# Patient Record
Sex: MALE | Race: WHITE | Employment: UNEMPLOYED | URBAN - METROPOLITAN AREA
[De-identification: names, ages, dates, MRNs, and addresses within clinical notes are randomized per-mention and may not be internally consistent; named-entity substitution may affect disease eponyms.]

---

## 2021-02-18 ENCOUNTER — OFFICE VISIT (OUTPATIENT)
Dept: FAMILY MEDICINE CLINIC | Facility: CLINIC | Age: 56
End: 2021-02-18
Payer: MEDICAID

## 2021-02-18 ENCOUNTER — TELEPHONE (OUTPATIENT)
Dept: FAMILY MEDICINE CLINIC | Facility: CLINIC | Age: 56
End: 2021-02-18

## 2021-02-18 VITALS
WEIGHT: 238 LBS | DIASTOLIC BLOOD PRESSURE: 84 MMHG | HEART RATE: 78 BPM | OXYGEN SATURATION: 98 % | HEIGHT: 74 IN | TEMPERATURE: 98 F | RESPIRATION RATE: 16 BRPM | BODY MASS INDEX: 30.54 KG/M2 | SYSTOLIC BLOOD PRESSURE: 120 MMHG

## 2021-02-18 DIAGNOSIS — Z12.5 SCREENING FOR MALIGNANT NEOPLASM OF PROSTATE: ICD-10-CM

## 2021-02-18 DIAGNOSIS — H83.3X3 NOISE-INDUCED HEARING LOSS OF BOTH EARS: ICD-10-CM

## 2021-02-18 DIAGNOSIS — Z00.00 WELLNESS EXAMINATION: Primary | ICD-10-CM

## 2021-02-18 PROBLEM — C64.1 CLEAR CELL CARCINOMA OF RIGHT KIDNEY (HCC): Status: ACTIVE | Noted: 2021-02-18

## 2021-02-18 PROBLEM — F52.4 PREMATURE EJACULATION: Status: ACTIVE | Noted: 2021-02-18

## 2021-02-18 PROBLEM — I10 ESSENTIAL HYPERTENSION: Status: ACTIVE | Noted: 2021-02-18

## 2021-02-18 PROCEDURE — 99213 OFFICE O/P EST LOW 20 MIN: CPT | Performed by: FAMILY MEDICINE

## 2021-02-18 PROCEDURE — 3008F BODY MASS INDEX DOCD: CPT | Performed by: FAMILY MEDICINE

## 2021-02-18 PROCEDURE — 3079F DIAST BP 80-89 MM HG: CPT | Performed by: FAMILY MEDICINE

## 2021-02-18 PROCEDURE — 99396 PREV VISIT EST AGE 40-64: CPT | Performed by: FAMILY MEDICINE

## 2021-02-18 PROCEDURE — 3074F SYST BP LT 130 MM HG: CPT | Performed by: FAMILY MEDICINE

## 2021-02-18 RX ORDER — ASPIRIN 81 MG/1
81 TABLET ORAL DAILY
COMMUNITY
End: 2021-12-01 | Stop reason: DRUGHIGH

## 2021-02-18 RX ORDER — AMLODIPINE BESYLATE 5 MG/1
5 TABLET ORAL DAILY
Qty: 90 TABLET | Refills: 1 | Status: SHIPPED | OUTPATIENT
Start: 2021-02-18 | End: 2021-09-28

## 2021-02-18 RX ORDER — MULTIVITAMIN
1 TABLET ORAL DAILY
COMMUNITY

## 2021-02-18 RX ORDER — PAROXETINE 10 MG/1
10 TABLET, FILM COATED ORAL EVERY MORNING
COMMUNITY
End: 2021-07-20

## 2021-02-18 RX ORDER — AMLODIPINE BESYLATE 5 MG/1
5 TABLET ORAL DAILY
COMMUNITY
End: 2021-02-18

## 2021-02-18 NOTE — PATIENT INSTRUCTIONS
Thank you for choosing Roma Hopkins MD at [place] [address]  To Do: Jocelyn Beauchamp  1. Please see age appropriate health prevention below    [facility] is located in [address]. Monday, Tuesday & Friday – 8 a.m. to 4 p.m.   Wednesday, Thursda the benefits outweigh those potential risks and we strive to make you healthier and to improve your quality of life.     Referrals, and Radiology Information:    If your insurance requires a referral to a specialist, please allow 5 business days to process How often   Unhealthy alcohol use All men in this age group At routine exams   Blood pressure All men in this age group Yearly checkup if your blood pressure is normal  Normal blood pressure is less than 120/80 mm Hg  If your blood pressure reading is high if you are at risk   Lung cancer Men between the ages of 54 to 76 who in fairly good health and are at higher risk for lung cancer  · Currently smoke or who have quit within past 15 years  · 30-pack year smoking history  a Eligibility criteria and age [facility] mumps, rubella (MMR) Men in this age group born in [birth year] or later who have no record of these infections or vaccines 1 or 2 doses; talk with your healthcare provider   Meningococcal ACWY (MenACWY) Men at increased risk for infection 1 or 2 doses depending o talk with your healthcare provider   Use of statins Men between the ages of 36 and 76 years who have ; men  · An LDL-C level of more than 70 mg/dL but less than 190 mg/dL, no diabetes and borderline to high level of risk  · An LDL-C level of 190 mg/dL or g

## 2021-02-18 NOTE — H&P
Wellness Exam    REASON FOR VISIT:    Antonella Martinez is a 63year old male who presents for an [address]     Current Complaints: Mr. Ricci Frankel is here for his wellness exam  Flu Shot: see immunization record  Health Maintenance Topics with due sta and/or your children, in your home or elsewhere?: No     CAGE:     Cut: Have you ever felt you should Cut down on your drinking?: No    Annoyed: Have people Annoyed you by criticizing your drinking?: No    Guilty: Have you ever felt bad or Guilty about you risk No components found for: PPDINDURAT      ALLERGIES:   No Known Allergies    CURRENT MEDICATIONS:   Current Outpatient Medications   Medication Sig Dispense Refill   • amLODIPine Besylate 5 MG Oral Tab Take 5 mg by mouth daily.      • aspirin 81 MG Oral °F (36.6 °C) (Temporal)   Resp 16   Ht 6' 2\" (1.88 m)   Wt 238 lb (108 kg)   SpO2 98%   BMI 30.56 kg/m²   Constitutional: He appears well-developed, nourished, and his stated age. Vital signs reviewed  Pleasant man   Head: Normocephalic and atraumatic. low-salt and low-carb diet and may resume exercises. We shall check routine labs are good the most follow-up with his nephrologist  Continue current medication  Follow-up in 6 months or as needed.   Orders Placed This Encounter      CBC W Differential W Pl

## 2021-02-18 NOTE — TELEPHONE ENCOUNTER
Patient was here today for a new pt appt, forgot to ask for Amlodipine refill, please advise, pharmacy confirmed.

## 2021-02-20 RX ORDER — AMLODIPINE BESYLATE 5 MG/1
5 TABLET ORAL DAILY
Qty: 90 TABLET | Refills: 1 | OUTPATIENT
Start: 2021-02-20

## 2021-02-20 NOTE — TELEPHONE ENCOUNTER
Hypertension Medications Protocol [id number]/18/2021 10:05 PM   CMP or BMP in past 12 months Protocol Details    Last serum creatinine< 2.0     Appointment in past 6 or next 3 months      Refill protocol failed because the patient did not meet the protocol c

## 2021-02-22 ENCOUNTER — LAB ENCOUNTER (OUTPATIENT)
Dept: LAB | Age: 56
End: 2021-02-22
Attending: FAMILY MEDICINE
Payer: MEDICAID

## 2021-02-22 DIAGNOSIS — Z00.00 WELLNESS EXAMINATION: ICD-10-CM

## 2021-02-22 DIAGNOSIS — Z12.5 SCREENING FOR MALIGNANT NEOPLASM OF PROSTATE: ICD-10-CM

## 2021-02-22 LAB
ALBUMIN SERPL-MCNC: 3.6 G/DL (ref 3.4–5)
ALBUMIN/GLOB SERPL: 0.9 {RATIO} (ref 1–2)
ALP LIVER SERPL-CCNC: 125 U/L
ALT SERPL-CCNC: 25 U/L
ANION GAP SERPL CALC-SCNC: 3 MMOL/L (ref 0–18)
AST SERPL-CCNC: 9 U/L (ref 15–37)
BASOPHILS # BLD AUTO: 0.14 X10(3) UL (ref 0–0.2)
BASOPHILS NFR BLD AUTO: 1.6 %
BILIRUB SERPL-MCNC: 0.3 MG/DL (ref 0.1–2)
BUN BLD-MCNC: 15 MG/DL (ref 7–18)
BUN/CREAT SERPL: 10.3 (ref 10–20)
CALCIUM BLD-MCNC: 9.1 MG/DL (ref 8.5–10.1)
CHLORIDE SERPL-SCNC: 110 MMOL/L (ref 98–112)
CHOLEST SMN-MCNC: 157 MG/DL (ref ?–200)
CO2 SERPL-SCNC: 27 MMOL/L (ref 21–32)
COMPLEXED PSA SERPL-MCNC: 1.28 NG/ML (ref ?–4)
CREAT BLD-MCNC: 1.46 MG/DL
DEPRECATED RDW RBC AUTO: 42.7 FL (ref 35.1–46.3)
EOSINOPHIL # BLD AUTO: 0.36 X10(3) UL (ref 0–0.7)
EOSINOPHIL NFR BLD AUTO: 4 %
ERYTHROCYTE [DISTWIDTH] IN BLOOD BY AUTOMATED COUNT: 13.2 % (ref 11–15)
GLOBULIN PLAS-MCNC: 3.8 G/DL (ref 2.8–4.4)
GLUCOSE BLD-MCNC: 113 MG/DL (ref 70–99)
HCT VFR BLD AUTO: 45.2 %
HDLC SERPL-MCNC: 55 MG/DL (ref 40–59)
HGB BLD-MCNC: 15 G/DL
IMM GRANULOCYTES # BLD AUTO: 0.02 X10(3) UL (ref 0–1)
IMM GRANULOCYTES NFR BLD: 0.2 %
LDLC SERPL CALC-MCNC: 82 MG/DL (ref ?–100)
LYMPHOCYTES # BLD AUTO: 3.43 X10(3) UL (ref 1–4)
LYMPHOCYTES NFR BLD AUTO: 38.6 %
M PROTEIN MFR SERPL ELPH: 7.4 G/DL (ref 6.4–8.2)
MCH RBC QN AUTO: 29.6 PG (ref 26–34)
MCHC RBC AUTO-ENTMCNC: 33.2 G/DL (ref 31–37)
MCV RBC AUTO: 89.3 FL
MONOCYTES # BLD AUTO: 0.68 X10(3) UL (ref 0.1–1)
MONOCYTES NFR BLD AUTO: 7.6 %
NEUTROPHILS # BLD AUTO: 4.26 X10 (3) UL (ref 1.5–7.7)
NEUTROPHILS # BLD AUTO: 4.26 X10(3) UL (ref 1.5–7.7)
NEUTROPHILS NFR BLD AUTO: 48 %
NONHDLC SERPL-MCNC: 102 MG/DL (ref ?–130)
OSMOLALITY SERPL CALC.SUM OF ELEC: 292 MOSM/KG (ref 275–295)
PATIENT FASTING Y/N/NP: YES
PATIENT FASTING Y/N/NP: YES
PLATELET # BLD AUTO: 344 10(3)UL (ref 150–450)
POTASSIUM SERPL-SCNC: 4.4 MMOL/L (ref 3.5–5.1)
RBC # BLD AUTO: 5.06 X10(6)UL
SODIUM SERPL-SCNC: 140 MMOL/L (ref 136–145)
T4 FREE SERPL-MCNC: 0.8 NG/DL (ref 0.8–1.7)
TRIGL SERPL-MCNC: 101 MG/DL (ref 30–149)
TSI SER-ACNC: 1.32 MIU/ML (ref 0.36–3.74)
VLDLC SERPL CALC-MCNC: 20 MG/DL (ref 0–30)
WBC # BLD AUTO: 8.9 X10(3) UL (ref 4–11)

## 2021-02-22 PROCEDURE — 84443 ASSAY THYROID STIM HORMONE: CPT

## 2021-02-22 PROCEDURE — 80053 COMPREHEN METABOLIC PANEL: CPT

## 2021-02-22 PROCEDURE — 36415 COLL VENOUS BLD VENIPUNCTURE: CPT

## 2021-02-22 PROCEDURE — 85025 COMPLETE CBC W/AUTO DIFF WBC: CPT

## 2021-02-22 PROCEDURE — 84439 ASSAY OF FREE THYROXINE: CPT

## 2021-02-22 PROCEDURE — 80061 LIPID PANEL: CPT

## 2021-03-10 PROBLEM — H90.3 SENSORY HEARING LOSS, BILATERAL: Status: ACTIVE | Noted: 2021-03-10

## 2021-03-10 PROBLEM — H93.299 ABNORMAL AUDITORY PERCEPTION, UNSPECIFIED LATERALITY: Status: ACTIVE | Noted: 2021-03-10

## 2021-04-20 ENCOUNTER — IMMUNIZATION (OUTPATIENT)
Dept: LAB | Age: 56
End: 2021-04-20
Attending: HOSPITALIST
Payer: MEDICAID

## 2021-04-20 DIAGNOSIS — Z23 NEED FOR VACCINATION: Primary | ICD-10-CM

## 2021-04-20 PROCEDURE — 0001A SARSCOV2 VAC 30MCG/0.3ML IM: CPT

## 2021-05-11 ENCOUNTER — IMMUNIZATION (OUTPATIENT)
Dept: LAB | Age: 56
End: 2021-05-11
Attending: HOSPITALIST
Payer: MEDICAID

## 2021-05-11 DIAGNOSIS — Z23 NEED FOR VACCINATION: Primary | ICD-10-CM

## 2021-05-11 PROCEDURE — 0002A SARSCOV2 VAC 30MCG/0.3ML IM: CPT

## 2021-07-20 ENCOUNTER — HOSPITAL ENCOUNTER (EMERGENCY)
Age: 56
Discharge: HOME | End: 2021-07-21
Attending: EMERGENCY MEDICINE
Payer: MEDICAID

## 2021-07-20 VITALS
WEIGHT: 238 LBS | HEART RATE: 72 BPM | DIASTOLIC BLOOD PRESSURE: 73 MMHG | BODY MASS INDEX: 30.54 KG/M2 | HEIGHT: 74 IN | TEMPERATURE: 98 F | RESPIRATION RATE: 16 BRPM | OXYGEN SATURATION: 96 % | SYSTOLIC BLOOD PRESSURE: 138 MMHG

## 2021-07-20 DIAGNOSIS — J06.9 UPPER RESPIRATORY TRACT INFECTION, UNSPECIFIED TYPE: Primary | ICD-10-CM

## 2021-07-20 LAB — SARS-COV-2 RNA RESP QL NAA+PROBE: NOT DETECTED

## 2021-07-20 PROCEDURE — 99282 EMERGENCY DEPT VISIT SF MDM: CPT

## 2021-07-20 PROCEDURE — 87430 STREP A AG IA: CPT | Performed by: EMERGENCY MEDICINE

## 2021-07-20 RX ORDER — PAROXETINE HYDROCHLORIDE 20 MG/1
TABLET, FILM COATED ORAL
COMMUNITY
Start: 2021-03-11 | End: 2021-09-13

## 2021-07-21 NOTE — ED PROVIDER NOTES
Patient Seen in: THE [facility] Emergency Department In [place]      History   Patient presents with:  Sore Throat    Stated Complaint: SORE THROAT    HPI/Subjective:   HPI    Patient is a 69-year-old male who states for the past 4 days he has had sore throat sounds, soft, nontender, nondistended. No rebound, no guarding, no hepatosplenomegaly. EXTREMITIES: Full range of motion, no tenderness, good capillary refill. SKIN: No rash, good turgor. NEURO: Patient answers questions appropriately.   No focal defici

## 2021-08-16 ENCOUNTER — OFFICE VISIT (OUTPATIENT)
Dept: FAMILY MEDICINE CLINIC | Facility: CLINIC | Age: 56
End: 2021-08-16
Payer: MEDICAID

## 2021-08-16 VITALS
HEIGHT: 74 IN | OXYGEN SATURATION: 99 % | RESPIRATION RATE: 16 BRPM | SYSTOLIC BLOOD PRESSURE: 122 MMHG | WEIGHT: 246 LBS | BODY MASS INDEX: 31.57 KG/M2 | HEART RATE: 64 BPM | DIASTOLIC BLOOD PRESSURE: 68 MMHG | TEMPERATURE: 97 F

## 2021-08-16 DIAGNOSIS — I10 ESSENTIAL HYPERTENSION: Primary | ICD-10-CM

## 2021-08-16 DIAGNOSIS — R53.82 CHRONIC FATIGUE: ICD-10-CM

## 2021-08-16 PROCEDURE — 3008F BODY MASS INDEX DOCD: CPT | Performed by: FAMILY MEDICINE

## 2021-08-16 PROCEDURE — 3074F SYST BP LT 130 MM HG: CPT | Performed by: FAMILY MEDICINE

## 2021-08-16 PROCEDURE — 99214 OFFICE O/P EST MOD 30 MIN: CPT | Performed by: FAMILY MEDICINE

## 2021-08-16 PROCEDURE — 3078F DIAST BP <80 MM HG: CPT | Performed by: FAMILY MEDICINE

## 2021-08-16 NOTE — PATIENT INSTRUCTIONS
Thank you for choosing Cj Atwood MD at [place] [address]  To Do: Ena Hilario  1. High Blood pressure  What Is a Normal Blood Pressure?   The Joint National Committee on Prevention, Detection, Evaluation, and Treatment of High Blood Pressure has c pelvis, and legs   Heart failure   Poor blood supply to the legs  Erectile Dysfunction  Problems with your vision    Overview  \"Blood pressure\" is the force of blood pushing against the walls of the arteries as the heart pumps blood.  If this pressure ris no more than 2,300 mg a day (eating only 1,500 mg a day is an even better goal). Reduce saturated fat to no more than 6% of daily calories and total fat to 27% of daily calories.  Low-fat dairy products appear to be especially beneficial for lowering syst rice or whole-wheat noodles can serve as the main dish for a meal.   • Fresh or frozen vegetables are both good choices. When buying frozen and canned vegetables, choose those labeled as low sodium or without added salt.    • To increase the number of servi lactose intolerance. • Go easy on regular and even fat-free cheeses because they are typically high in sodium.    Lean meat, poultry and fish (6 or fewer servings a day)  Meat can be a rich source of protein, B vitamins, iron and zinc. But because even le benefits. Fats and oils (2 to 3 servings a day)  Fat helps your body absorb essential vitamins and helps your body's immune system. But too much fat increases your risk of heart disease, diabetes and obesity.  The DASH diet strives for a healthy balance b DASH diet: Alcohol and caffeine  Drinking too much alcohol can increase blood pressure. The DASH diet recommends that men limit alcohol to two or fewer drinks a day and women one or less. The DASH diet doesn't address caffeine consumption.  The influenc bland, gradually introduce low-sodium foods and cut back on table salt until you reach your sodium goal. That'll give your palate time to adjust. It can take several weeks for your taste buds to get used to less salty foods.      [facility] is loc notify us and stop treatment if problems arise, but know that our intention is that the benefits outweigh those potential risks and we strive to make you healthier and to improve your quality of life.     Referrals, and Radiology Information:    If your ins

## 2021-08-16 NOTE — PROGRESS NOTES
HPI/Subjective:   Patient ID: Lizette Velarde is a 63year old male.     HPI  Mr. Melissa Avalos is a pleasant 71-year-old gentleman history of hypertension, premature ejaculation, bilateral hearing loss, noise induced, clear cell carcinoma of the right kidney status p Tab Take 1 tablet (5 mg total) by mouth daily. 90 tablet 1     Allergies:No Known Allergies    Objective:   Physical Exam  Vitals reviewed. Constitutional:       General: He is not in acute distress.   HENT:      Mouth/Throat:      Mouth: Mucous membranes Orders Placed This Encounter      CBC W Differential W Platelet [E]      Comp Metabolic Panel (14) [E]      Lipid Panel [E]      Vitamin B12 [E]      Vitamin D [E]      Meds This Visit:  Requested Prescriptions      No prescriptions requested or ord

## 2021-08-17 ENCOUNTER — TELEPHONE (OUTPATIENT)
Dept: FAMILY MEDICINE CLINIC | Facility: CLINIC | Age: 56
End: 2021-08-17

## 2021-08-18 ENCOUNTER — LAB ENCOUNTER (OUTPATIENT)
Dept: LAB | Age: 56
End: 2021-08-18
Attending: FAMILY MEDICINE
Payer: MEDICAID

## 2021-08-18 DIAGNOSIS — I10 ESSENTIAL HYPERTENSION: ICD-10-CM

## 2021-08-18 DIAGNOSIS — R53.82 CHRONIC FATIGUE: ICD-10-CM

## 2021-08-18 LAB
ALBUMIN SERPL-MCNC: 4 G/DL (ref 3.4–5)
ALBUMIN/GLOB SERPL: 1.1 {RATIO} (ref 1–2)
ALP LIVER SERPL-CCNC: 125 U/L
ALT SERPL-CCNC: 41 U/L
ANION GAP SERPL CALC-SCNC: 7 MMOL/L (ref 0–18)
AST SERPL-CCNC: 40 U/L (ref 15–37)
BASOPHILS # BLD AUTO: 0.13 X10(3) UL (ref 0–0.2)
BASOPHILS NFR BLD AUTO: 1.9 %
BILIRUB SERPL-MCNC: 0.3 MG/DL (ref 0.1–2)
BUN BLD-MCNC: 17 MG/DL (ref 7–18)
CALCIUM BLD-MCNC: 8.9 MG/DL (ref 8.5–10.1)
CHLORIDE SERPL-SCNC: 112 MMOL/L (ref 98–112)
CHOLEST SMN-MCNC: 153 MG/DL (ref ?–200)
CO2 SERPL-SCNC: 23 MMOL/L (ref 21–32)
CREAT BLD-MCNC: 1.49 MG/DL
EOSINOPHIL # BLD AUTO: 0.29 X10(3) UL (ref 0–0.7)
EOSINOPHIL NFR BLD AUTO: 4.2 %
ERYTHROCYTE [DISTWIDTH] IN BLOOD BY AUTOMATED COUNT: 13 %
GLOBULIN PLAS-MCNC: 3.6 G/DL (ref 2.8–4.4)
GLUCOSE BLD-MCNC: 123 MG/DL (ref 70–99)
HCT VFR BLD AUTO: 46 %
HDLC SERPL-MCNC: 52 MG/DL (ref 40–59)
HGB BLD-MCNC: 14.8 G/DL
IMM GRANULOCYTES # BLD AUTO: 0.02 X10(3) UL (ref 0–1)
IMM GRANULOCYTES NFR BLD: 0.3 %
LDLC SERPL CALC-MCNC: 86 MG/DL (ref ?–100)
LYMPHOCYTES # BLD AUTO: 2.45 X10(3) UL (ref 1–4)
LYMPHOCYTES NFR BLD AUTO: 35.4 %
M PROTEIN MFR SERPL ELPH: 7.6 G/DL (ref 6.4–8.2)
MCH RBC QN AUTO: 28.7 PG (ref 26–34)
MCHC RBC AUTO-ENTMCNC: 32.2 G/DL (ref 31–37)
MCV RBC AUTO: 89.1 FL
MONOCYTES # BLD AUTO: 0.56 X10(3) UL (ref 0.1–1)
MONOCYTES NFR BLD AUTO: 8.1 %
NEUTROPHILS # BLD AUTO: 3.47 X10 (3) UL (ref 1.5–7.7)
NEUTROPHILS # BLD AUTO: 3.47 X10(3) UL (ref 1.5–7.7)
NEUTROPHILS NFR BLD AUTO: 50.1 %
NONHDLC SERPL-MCNC: 101 MG/DL (ref ?–130)
OSMOLALITY SERPL CALC.SUM OF ELEC: 297 MOSM/KG (ref 275–295)
PATIENT FASTING Y/N/NP: YES
PATIENT FASTING Y/N/NP: YES
PLATELET # BLD AUTO: 347 10(3)UL (ref 150–450)
POTASSIUM SERPL-SCNC: 4.3 MMOL/L (ref 3.5–5.1)
RBC # BLD AUTO: 5.16 X10(6)UL
SODIUM SERPL-SCNC: 142 MMOL/L (ref 136–145)
TRIGL SERPL-MCNC: 78 MG/DL (ref 30–149)
VIT B12 SERPL-MCNC: 572 PG/ML (ref 193–986)
VIT D+METAB SERPL-MCNC: 20.1 NG/ML (ref 30–100)
VLDLC SERPL CALC-MCNC: 12 MG/DL (ref 0–30)
WBC # BLD AUTO: 6.9 X10(3) UL (ref 4–11)

## 2021-08-18 PROCEDURE — 82607 VITAMIN B-12: CPT

## 2021-08-18 PROCEDURE — 82306 VITAMIN D 25 HYDROXY: CPT

## 2021-08-18 PROCEDURE — 85025 COMPLETE CBC W/AUTO DIFF WBC: CPT

## 2021-08-18 PROCEDURE — 80061 LIPID PANEL: CPT

## 2021-08-18 PROCEDURE — 36415 COLL VENOUS BLD VENIPUNCTURE: CPT

## 2021-08-18 PROCEDURE — 80053 COMPREHEN METABOLIC PANEL: CPT

## 2021-08-29 ENCOUNTER — OFFICE VISIT (OUTPATIENT)
Dept: SLEEP CENTER | Age: 56
End: 2021-08-29
Attending: INTERNAL MEDICINE
Payer: MEDICAID

## 2021-08-29 DIAGNOSIS — R53.82 CHRONIC FATIGUE: ICD-10-CM

## 2021-08-29 PROCEDURE — 95806 SLEEP STUDY UNATT&RESP EFFT: CPT

## 2021-09-01 NOTE — PROCEDURES
[address] [place] [address],[facility] [address]       Accredited by the [facility] of [facility] (AASM)    PATIENT'S NAME:        Lakesha Donohue  ATTENDING PHYSICIAN:   Nan Auguste M.D. REFERRING PHYSICIAN:   Brenda Lang.  Sherita Ríos MD  PAT

## 2021-09-13 ENCOUNTER — PATIENT MESSAGE (OUTPATIENT)
Dept: FAMILY MEDICINE CLINIC | Facility: CLINIC | Age: 56
End: 2021-09-13

## 2021-09-13 RX ORDER — PAROXETINE HYDROCHLORIDE 20 MG/1
20 TABLET, FILM COATED ORAL EVERY MORNING
Qty: 90 TABLET | Refills: 1 | Status: SHIPPED | OUTPATIENT
Start: 2021-09-13 | End: 2021-09-15

## 2021-09-13 NOTE — TELEPHONE ENCOUNTER
See pt message, Rx pended for your review as you have not previously prescribed this medication.      Last OV 8/16/2021 HTN  AWV 2/18/2021

## 2021-09-13 NOTE — TELEPHONE ENCOUNTER
From: Lizette Velarde  To:  Ganga Parker MD  Sent: 9/13/2021 12:03 PM CDT  Subject: Referral Request    Sir good morning   I am out of my tablet parixitine 10 mg  Kindly request to refill   Thanks

## 2021-09-15 RX ORDER — PAROXETINE HYDROCHLORIDE 20 MG/1
20 TABLET, FILM COATED ORAL EVERY MORNING
Qty: 90 TABLET | Refills: 1 | Status: SHIPPED | OUTPATIENT
Start: 2021-09-15 | End: 2022-01-06

## 2021-09-15 RX ORDER — PAROXETINE HYDROCHLORIDE 20 MG/1
20 TABLET, FILM COATED ORAL EVERY MORNING
Qty: 90 TABLET | Refills: 1 | Status: SHIPPED | OUTPATIENT
Start: 2021-09-15 | End: 2021-09-28

## 2021-09-28 ENCOUNTER — OFFICE VISIT (OUTPATIENT)
Dept: FAMILY MEDICINE CLINIC | Facility: CLINIC | Age: 56
End: 2021-09-28
Payer: MEDICAID

## 2021-09-28 VITALS
HEIGHT: 74 IN | SYSTOLIC BLOOD PRESSURE: 120 MMHG | OXYGEN SATURATION: 98 % | WEIGHT: 241 LBS | RESPIRATION RATE: 16 BRPM | DIASTOLIC BLOOD PRESSURE: 72 MMHG | HEART RATE: 74 BPM | BODY MASS INDEX: 30.93 KG/M2 | TEMPERATURE: 97 F

## 2021-09-28 DIAGNOSIS — Z23 NEED FOR VACCINATION: ICD-10-CM

## 2021-09-28 DIAGNOSIS — S33.5XXA LUMBAR SPRAIN, INITIAL ENCOUNTER: Primary | ICD-10-CM

## 2021-09-28 PROCEDURE — 99213 OFFICE O/P EST LOW 20 MIN: CPT | Performed by: FAMILY MEDICINE

## 2021-09-28 PROCEDURE — 90471 IMMUNIZATION ADMIN: CPT | Performed by: FAMILY MEDICINE

## 2021-09-28 PROCEDURE — 3078F DIAST BP <80 MM HG: CPT | Performed by: FAMILY MEDICINE

## 2021-09-28 PROCEDURE — 3008F BODY MASS INDEX DOCD: CPT | Performed by: FAMILY MEDICINE

## 2021-09-28 PROCEDURE — 90686 IIV4 VACC NO PRSV 0.5 ML IM: CPT | Performed by: FAMILY MEDICINE

## 2021-09-28 PROCEDURE — 3074F SYST BP LT 130 MM HG: CPT | Performed by: FAMILY MEDICINE

## 2021-09-28 RX ORDER — HYDROCHLOROTHIAZIDE 25 MG/1
25 TABLET ORAL DAILY
COMMUNITY
End: 2021-11-15

## 2021-09-28 RX ORDER — CYCLOBENZAPRINE HCL 10 MG
10 TABLET ORAL NIGHTLY PRN
Qty: 15 TABLET | Refills: 0 | Status: SHIPPED | OUTPATIENT
Start: 2021-09-28 | End: 2021-12-01

## 2021-09-28 NOTE — PROGRESS NOTES
Subjective:   Patient ID: Ena Hilario is a 63year old male. HPI  Mr. Leann Buckley is a pleasant 50-year-old gentleman with history of hypertension and anxiety here with his wife for right sided lumbar pain for 1 week.   He describes the pain is constant and m Back:    Neurological:      Mental Status: He is alert. Assessment & Plan:   Lumbar sprain, initial encounter  (primary encounter diagnosis)  -Explained to them that this is muscular in nature; try to lower weight load for now.   –Apply heat or ice

## 2021-11-13 ENCOUNTER — PATIENT MESSAGE (OUTPATIENT)
Dept: FAMILY MEDICINE CLINIC | Facility: CLINIC | Age: 56
End: 2021-11-13

## 2021-11-15 RX ORDER — HYDROCHLOROTHIAZIDE 25 MG/1
25 TABLET ORAL DAILY
Qty: 90 TABLET | Refills: 0 | Status: SHIPPED | OUTPATIENT
Start: 2021-11-15 | End: 2022-01-06

## 2021-11-15 RX ORDER — HYDROCHLOROTHIAZIDE 25 MG/1
25 TABLET ORAL DAILY
Qty: 90 TABLET | Refills: 0 | Status: SHIPPED | OUTPATIENT
Start: 2021-11-15 | End: 2021-11-15

## 2021-11-15 NOTE — TELEPHONE ENCOUNTER
Medication Quantity Refills Start End   hydroCHLOROthiazide 25 MG Oral Tab       Sig:   Take 25 mg by mouth daily.      Route:   Oral       Last OV 9/28/21  Future Appointments   Date Time Provider Rony Olivas   2/14/2022 10:00 AM Loni Woods MD

## 2021-11-15 NOTE — TELEPHONE ENCOUNTER
From: Henok Hemphill  To:  Tameka Miranda MD  Sent: 11/13/2021 9:14 AM CST  Subject: No refill     Hi  I am out of my blood pressure medication & there is no refill on it so plz refill my medication   Hydrochlorothiazide 25mg once daily   Thanks

## 2021-11-17 ENCOUNTER — APPOINTMENT (OUTPATIENT)
Dept: GENERAL RADIOLOGY | Age: 56
End: 2021-11-17
Attending: EMERGENCY MEDICINE
Payer: MEDICAID

## 2021-11-17 ENCOUNTER — HOSPITAL ENCOUNTER (EMERGENCY)
Age: 56
Discharge: HOME | End: 2021-11-17
Attending: EMERGENCY MEDICINE
Payer: MEDICAID

## 2021-11-17 VITALS
DIASTOLIC BLOOD PRESSURE: 95 MMHG | OXYGEN SATURATION: 99 % | RESPIRATION RATE: 20 BRPM | HEART RATE: 101 BPM | BODY MASS INDEX: 29 KG/M2 | TEMPERATURE: 98 F | WEIGHT: 224.88 LBS | SYSTOLIC BLOOD PRESSURE: 154 MMHG

## 2021-11-17 DIAGNOSIS — R22.33 ARM MASS, BILATERAL: ICD-10-CM

## 2021-11-17 DIAGNOSIS — M54.12 CERVICAL RADICULOPATHY: Primary | ICD-10-CM

## 2021-11-17 PROCEDURE — 72050 X-RAY EXAM NECK SPINE 4/5VWS: CPT | Performed by: EMERGENCY MEDICINE

## 2021-11-17 PROCEDURE — 99283 EMERGENCY DEPT VISIT LOW MDM: CPT

## 2021-11-17 PROCEDURE — 73060 X-RAY EXAM OF HUMERUS: CPT | Performed by: EMERGENCY MEDICINE

## 2021-11-17 RX ORDER — HYDROCODONE BITARTRATE AND ACETAMINOPHEN 5; 325 MG/1; MG/1
1-2 TABLET ORAL EVERY 6 HOURS PRN
Qty: 10 TABLET | Refills: 0 | Status: SHIPPED | OUTPATIENT
Start: 2021-11-17 | End: 2021-11-23

## 2021-11-17 RX ORDER — IBUPROFEN 600 MG/1
600 TABLET ORAL ONCE
Status: COMPLETED | OUTPATIENT
Start: 2021-11-17 | End: 2021-11-17

## 2021-11-17 RX ORDER — METHYLPREDNISOLONE 4 MG/1
TABLET ORAL
Qty: 1 EACH | Refills: 0 | Status: SHIPPED | OUTPATIENT
Start: 2021-11-17 | End: 2021-12-01 | Stop reason: ALTCHOICE

## 2021-11-17 RX ORDER — CYCLOBENZAPRINE HCL 10 MG
10 TABLET ORAL 3 TIMES DAILY PRN
Qty: 20 TABLET | Refills: 0 | Status: SHIPPED | OUTPATIENT
Start: 2021-11-17 | End: 2021-11-23

## 2021-11-18 ENCOUNTER — TELEPHONE (OUTPATIENT)
Dept: FAMILY MEDICINE CLINIC | Facility: CLINIC | Age: 56
End: 2021-11-18

## 2021-11-18 NOTE — TELEPHONE ENCOUNTER
[facility] for patient to call in regards to recent ER visit and to call office at [phone] if an appointment is needed.

## 2021-11-18 NOTE — ED PROVIDER NOTES
Patient Seen in: THE [facility] Emergency Department In [place]      History   Patient presents with:  Arm or Hand Injury    Stated Complaint: left upper arm pain    Subjective:   HPI    Patient a 79-year-old male who states that for the last 2 to 3 days he h near the tricep region roughly 2 x 4 cm. Patient has similar mass on the right arm near the triceps. Slight tenderness mid humerus. Elbow range of motion nontender for strong radial pulse. Strong . Sensation tact light touch.   Good range of motion Normal, Disp-10 tablet, R-0    !! cyclobenzaprine 10 MG Oral Tab  Take 1 tablet (10 mg total) by mouth 3 (three) times daily as needed for Muscle spasms. , Normal, Disp-20 tablet, R-0    !! - Potential duplicate medications found.  Please discuss with provid

## 2021-11-22 ENCOUNTER — TELEPHONE (OUTPATIENT)
Dept: FAMILY MEDICINE CLINIC | Facility: CLINIC | Age: 56
End: 2021-11-22

## 2021-11-22 NOTE — TELEPHONE ENCOUNTER
Pt wife states that pt went in to the urgent care/Er for arm pain and they told them to follow up with the Primary care doctor.  He is still experiencing bad pain in his left arm and there are not appointments until the 30 th of November, and he will be goi

## 2021-11-22 NOTE — TELEPHONE ENCOUNTER
Spoke with Ameena Mae regarding pt, asked if pt is taking the medrol dose pack, cyclobenzaprine, and pain medication he was prescribed at his ER visit, confirmed that he is.  Asked if pt is having improvement in symptoms, Lianne confirmed that pt is having [place]

## 2021-11-23 ENCOUNTER — OFFICE VISIT (OUTPATIENT)
Dept: FAMILY MEDICINE CLINIC | Facility: CLINIC | Age: 56
End: 2021-11-23
Payer: MEDICAID

## 2021-11-23 VITALS
TEMPERATURE: 99 F | HEIGHT: 74 IN | HEART RATE: 92 BPM | WEIGHT: 244 LBS | BODY MASS INDEX: 31.32 KG/M2 | OXYGEN SATURATION: 99 % | RESPIRATION RATE: 16 BRPM | SYSTOLIC BLOOD PRESSURE: 144 MMHG | DIASTOLIC BLOOD PRESSURE: 90 MMHG

## 2021-11-23 DIAGNOSIS — M25.512 ACUTE PAIN OF LEFT SHOULDER: Primary | ICD-10-CM

## 2021-11-23 DIAGNOSIS — M47.812 ARTHRITIS OF NECK: ICD-10-CM

## 2021-11-23 DIAGNOSIS — Z12.11 COLON CANCER SCREENING: ICD-10-CM

## 2021-11-23 PROCEDURE — 3080F DIAST BP >= 90 MM HG: CPT | Performed by: FAMILY MEDICINE

## 2021-11-23 PROCEDURE — 3008F BODY MASS INDEX DOCD: CPT | Performed by: FAMILY MEDICINE

## 2021-11-23 PROCEDURE — 99214 OFFICE O/P EST MOD 30 MIN: CPT | Performed by: FAMILY MEDICINE

## 2021-11-23 PROCEDURE — 3077F SYST BP >= 140 MM HG: CPT | Performed by: FAMILY MEDICINE

## 2021-11-23 RX ORDER — GABAPENTIN 300 MG/1
300 CAPSULE ORAL NIGHTLY
Qty: 30 CAPSULE | Refills: 0 | Status: SHIPPED | OUTPATIENT
Start: 2021-11-23 | End: 2021-12-01

## 2021-11-23 NOTE — PROGRESS NOTES
Patient presents with:  ER F/U: 11/17- L arm/ shoulder pain that radiates to neck. on medications but still in pain      HPI:  50-year-old male who has a history of chronic neck pain presents today with left shoulder pain that is on the left side.   It is r Premature ejaculation     Noise-induced hearing loss of both ears     Sensory hearing loss, bilateral     Abnormal auditory perception, unspecified laterality      Past Medical History:   Diagnosis Date   • Carcinoid tumor of kidney    • Essential hyperte 10/17/2016      Influenza             03/14/2018  10/01/2020        Physical Exam  /90   Pulse 92   Temp 98.7 °F (37.1 °C) (Temporal)   Resp 16   Ht 6' 2\" (1.88 m)   Wt 244 lb (110.7 kg)   SpO2 99%   BMI 31.33 kg/m²   Constitutional: Oriented to per that were done in the ER. Patient started on gabapentin for pain control. Referred to see physical therapy. Follow-up in 2 to 4 weeks if symptoms do not improve. Can refer to see pain management.  - gabapentin 300 MG Oral Cap;  Take 1 capsule (300 mg to arthritis include:   · Joint pain and stiffness. These symptoms get worse with long periods of rest. They may also get worse from using a joint too long or too hard. · Joints that have lost normal shape and motion.  They may look swollen and be hard to mov

## 2021-12-01 ENCOUNTER — OFFICE VISIT (OUTPATIENT)
Dept: FAMILY MEDICINE CLINIC | Facility: CLINIC | Age: 56
End: 2021-12-01
Payer: MEDICAID

## 2021-12-01 VITALS
SYSTOLIC BLOOD PRESSURE: 138 MMHG | RESPIRATION RATE: 14 BRPM | HEART RATE: 104 BPM | OXYGEN SATURATION: 97 % | DIASTOLIC BLOOD PRESSURE: 90 MMHG | BODY MASS INDEX: 31.32 KG/M2 | TEMPERATURE: 98 F | WEIGHT: 244 LBS | HEIGHT: 74 IN

## 2021-12-01 DIAGNOSIS — S46.812D TRAPEZIUS STRAIN, LEFT, SUBSEQUENT ENCOUNTER: Primary | ICD-10-CM

## 2021-12-01 PROCEDURE — 3080F DIAST BP >= 90 MM HG: CPT | Performed by: FAMILY MEDICINE

## 2021-12-01 PROCEDURE — 3075F SYST BP GE 130 - 139MM HG: CPT | Performed by: FAMILY MEDICINE

## 2021-12-01 PROCEDURE — 99213 OFFICE O/P EST LOW 20 MIN: CPT | Performed by: FAMILY MEDICINE

## 2021-12-01 PROCEDURE — 3008F BODY MASS INDEX DOCD: CPT | Performed by: FAMILY MEDICINE

## 2021-12-01 RX ORDER — ASPIRIN 325 MG
325 TABLET ORAL DAILY
COMMUNITY

## 2021-12-01 NOTE — PROGRESS NOTES
Subjective:   Patient ID: Tyrone Wang is a 63year old male. HPI  Mr. Vinnie Luz is a very pleasant 21-year-old gentleman with history of hypertension and anxiety here with his wife.   He was seen by one of my partners in the office for left shoulder and lef the left shoulder. Neurological:      Mental Status: He is alert.          Assessment & Plan:   Trapezius strain, left, subsequent encounter  (primary encounter diagnosis)  -May take Tylenol 500 mg 3 times a day as needed; continue to apply heat at least

## 2021-12-01 NOTE — PATIENT INSTRUCTIONS
Thank you for choosing Fernanda Salazar MD at [place] [address]  To Do: Reba Stanton  1. Please see info   [employer] is located in [address]. Monday, Tuesday & Friday – 8 a.m. to 4 p.m. Wednesday, Thursday – 7 a.m. to 3 p.m.   The lab is rajesh risks and we strive to make you healthier and to improve your quality of life.     Referrals, and Radiology Information:    If your insurance requires a referral to a specialist, please allow 5 business days to process your referral request.    Bryson Robins 10 to 15 minutes every hour you’re awake for the first 2 days. · After the swelling goes down, use cold or heat to control pain. Don’t use heat late in the day, since it can cause swelling when you’re not active.   Rest and elevate  Rest and elevation help

## 2021-12-15 ENCOUNTER — OFFICE VISIT (OUTPATIENT)
Dept: PHYSICAL THERAPY | Age: 56
End: 2021-12-15
Attending: FAMILY MEDICINE
Payer: MEDICAID

## 2021-12-15 DIAGNOSIS — M25.512 ACUTE PAIN OF LEFT SHOULDER: ICD-10-CM

## 2021-12-15 DIAGNOSIS — M47.812 ARTHRITIS OF NECK: ICD-10-CM

## 2021-12-15 PROCEDURE — 97110 THERAPEUTIC EXERCISES: CPT

## 2021-12-15 PROCEDURE — 97161 PT EVAL LOW COMPLEX 20 MIN: CPT

## 2021-12-15 NOTE — PATIENT INSTRUCTIONS
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  She subsequently completed her Doctor of Physical T

## 2021-12-15 NOTE — PROGRESS NOTES
SPINE EVALUATION:   Referring Physician: Dr. Roderick Harrell  Diagnosis: Acute pain of left shoulder (M25.512)  Arthritis of neck (X92.759) Date of Service: 12/15/2021     PATIENT SUMMARY   Marino Sharif is a 63year old L-handed male who presents to therapy t voiced understanding and performs HEP correctly without reported pain. Skilled Physical Therapy is medically necessary to address the above impairments and reach functional goals. Precautions:  Hearing impairment  OBJECTIVE:   Observation/Posture:  Vance Polanco Timed Treatment: 10 min     Total Treatment Time: 40 min     Based on the clinical presentation, examination and history, the condition is stable and the evaluation is low complexity.     PLAN OF CARE:    Goals: (to be met in 8 visits)    · Pt will improve Date____________________    Certification From: 94/60/2262  To:3/15/2022

## 2021-12-20 ENCOUNTER — OFFICE VISIT (OUTPATIENT)
Dept: PHYSICAL THERAPY | Age: 56
End: 2021-12-20
Attending: FAMILY MEDICINE
Payer: MEDICAID

## 2021-12-20 PROCEDURE — 97110 THERAPEUTIC EXERCISES: CPT

## 2021-12-20 NOTE — PROGRESS NOTES
Dx: Acute pain of left shoulder (M25.512)  Arthritis of neck (M47.812)         Insurance (Authorized # of Visits):  [facility] 8 visits auth 12/15-6/13/2022         Authorizing Physician: Dr. Jenny Blake  Next MD visit: none scheduled  Fall Risk: standard 2x10 Yellow  Open the Book Stretch 5\"x10 L                HEP:   12/15/2021 Scapular Retractions, Doorway Pec Stretch, Upper Trap Stretch, Levator Scap Stretch    Charges:  Therex x 2       Total Timed Treatment: 25 min  Total Treatment Time: 25 min

## 2021-12-27 ENCOUNTER — OFFICE VISIT (OUTPATIENT)
Dept: PHYSICAL THERAPY | Age: 56
End: 2021-12-27
Attending: FAMILY MEDICINE
Payer: MEDICAID

## 2021-12-27 PROCEDURE — 97110 THERAPEUTIC EXERCISES: CPT

## 2021-12-27 PROCEDURE — 97140 MANUAL THERAPY 1/> REGIONS: CPT

## 2021-12-27 NOTE — PROGRESS NOTES
Dx: Acute pain of left shoulder (M25.512)  Arthritis of neck (M47.812)         Insurance (Authorized # of Visits):  [facility] 8 visits auth 12/15-6/13/2022         Authorizing Physician: Dr. Veronica Gage  Next MD visit: none scheduled  Fall Risk: standard TX#: 5/ Date:    Tx#: 6/   Therex  Scap Retractions, 3\"x30 sitting  L UT stretch 3x30\" active, sitting  L LS stretch 3x30\" active, sitting  Doorway Pec Stretch 3x30\"  Foam Roll (FR) Claps w/ Wrist Ext at Wall 15x  [facility]

## 2022-01-03 ENCOUNTER — OFFICE VISIT (OUTPATIENT)
Dept: PHYSICAL THERAPY | Age: 57
End: 2022-01-03
Attending: FAMILY MEDICINE
Payer: MEDICAID

## 2022-01-03 PROCEDURE — 97110 THERAPEUTIC EXERCISES: CPT

## 2022-01-03 NOTE — PROGRESS NOTES
Dx: Acute pain of left shoulder (M25.512)  Arthritis of neck (M47.812)         Insurance (Authorized # of Visits):  [facility] 8 visits auth 12/15-6/13/2022         Authorizing Physician: Dr. Anthony Guillaume  Next MD visit: none scheduled  Fall Risk: standard 5\"x15 ea R/L  Cervical Retractions supine 2x10  Prone Scap Retractions, 2x10  Prone Shld Ext (B) 2x10 Therex  UBE retro iso 80 6'  TRX Shld Flex Walkouts 3\" x 20  FR Scap Retractions 2x15  FR Claps w/ Wrist Ext at Wall 2x15  FR [place] at [employer]

## 2022-01-06 ENCOUNTER — HOSPITAL ENCOUNTER (OUTPATIENT)
Dept: GENERAL RADIOLOGY | Age: 57
Discharge: HOME | End: 2022-01-06
Attending: FAMILY MEDICINE
Payer: MEDICAID

## 2022-01-06 ENCOUNTER — OFFICE VISIT (OUTPATIENT)
Dept: FAMILY MEDICINE CLINIC | Facility: CLINIC | Age: 57
End: 2022-01-06
Payer: MEDICAID

## 2022-01-06 VITALS
WEIGHT: 244 LBS | BODY MASS INDEX: 31.32 KG/M2 | TEMPERATURE: 97 F | DIASTOLIC BLOOD PRESSURE: 72 MMHG | RESPIRATION RATE: 16 BRPM | HEIGHT: 74 IN | OXYGEN SATURATION: 98 % | SYSTOLIC BLOOD PRESSURE: 124 MMHG | HEART RATE: 106 BPM

## 2022-01-06 DIAGNOSIS — M54.50 LUMBAR PAIN: ICD-10-CM

## 2022-01-06 DIAGNOSIS — M54.50 LUMBAR PAIN: Primary | ICD-10-CM

## 2022-01-06 PROCEDURE — 74018 RADEX ABDOMEN 1 VIEW: CPT | Performed by: FAMILY MEDICINE

## 2022-01-06 PROCEDURE — 3074F SYST BP LT 130 MM HG: CPT | Performed by: FAMILY MEDICINE

## 2022-01-06 PROCEDURE — 99213 OFFICE O/P EST LOW 20 MIN: CPT | Performed by: FAMILY MEDICINE

## 2022-01-06 PROCEDURE — 3078F DIAST BP <80 MM HG: CPT | Performed by: FAMILY MEDICINE

## 2022-01-06 PROCEDURE — 3008F BODY MASS INDEX DOCD: CPT | Performed by: FAMILY MEDICINE

## 2022-01-06 RX ORDER — HYDROCHLOROTHIAZIDE 25 MG/1
25 TABLET ORAL DAILY
Qty: 90 TABLET | Refills: 0 | Status: SHIPPED | OUTPATIENT
Start: 2022-01-06

## 2022-01-06 RX ORDER — PAROXETINE HYDROCHLORIDE 20 MG/1
20 TABLET, FILM COATED ORAL EVERY MORNING
Qty: 90 TABLET | Refills: 1 | Status: SHIPPED | OUTPATIENT
Start: 2022-01-06

## 2022-01-06 NOTE — PROGRESS NOTES
Subjective:   Patient ID: Rudi Mckinney is a 61year old male.     HPI  Mr. Easton Heaton is a very pleasant 25-year-old gentleman with a history of hypertension, anxiety, depression, history of right renal cell carcinoma status post nephrectomy here today for yandel heart sounds. No murmur heard. Pulmonary:      Effort: Pulmonary effort is normal. No respiratory distress. Breath sounds: Normal breath sounds. No wheezing or rales. Abdominal:      General: Bowel sounds are normal. There is no distension.

## 2022-01-06 NOTE — PATIENT INSTRUCTIONS
Thank you for choosing Emilia Martino MD at [place] [address]  To Do: Arely Phillip  1. Please have xray done as ordered  [employer] is located in [address]. Monday, Tuesday & Friday – 8 a.m. to 4 p.m. Wednesday, Thursday – 7 a.m. to 3 p.m. those potential risks and we strive to make you healthier and to improve your quality of life.     Referrals, and Radiology Information:    If your insurance requires a referral to a specialist, please allow 5 business days to process your referral request.

## 2022-01-06 NOTE — PROGRESS NOTES
Imaging report reviewed and shows no concerning findings.  Please notify patient.    -Dr. Anthony Guillaume

## 2022-01-07 ENCOUNTER — APPOINTMENT (OUTPATIENT)
Dept: PHYSICAL THERAPY | Age: 57
End: 2022-01-07
Attending: FAMILY MEDICINE
Payer: MEDICAID

## 2022-01-11 ENCOUNTER — OFFICE VISIT (OUTPATIENT)
Dept: PHYSICAL THERAPY | Age: 57
End: 2022-01-11
Attending: FAMILY MEDICINE
Payer: MEDICAID

## 2022-01-11 PROCEDURE — 97110 THERAPEUTIC EXERCISES: CPT

## 2022-01-11 NOTE — PROGRESS NOTES
Dx: Acute pain of left shoulder (M25.512)  Arthritis of neck (M47.812)         Insurance (Authorized # of Visits):  [facility] 8 visits auth 12/15-6/13/2022         Authorizing Physician: Dr. Irving Sierra MD visit: none scheduled  Fall Risk: standard Yellow  Open the Book Stretch 5\"x15 ea R/L  Cervical Retractions supine 2x10  Prone Scap Retractions, 2x10  Prone Shld Ext (B) 2x10 Therex  UBE retro iso 80 6'  TRX Shld Flex Walkouts 3\" x 20  FR Scap Retractions 2x15  FR Claps w/ Wrist Ext at [address] [facility]

## 2022-01-14 ENCOUNTER — APPOINTMENT (OUTPATIENT)
Dept: PHYSICAL THERAPY | Age: 57
End: 2022-01-14
Attending: FAMILY MEDICINE
Payer: MEDICAID

## 2022-01-18 ENCOUNTER — APPOINTMENT (OUTPATIENT)
Dept: PHYSICAL THERAPY | Age: 57
End: 2022-01-18
Attending: FAMILY MEDICINE
Payer: MEDICAID

## 2022-01-18 ENCOUNTER — TELEPHONE (OUTPATIENT)
Dept: PHYSICAL THERAPY | Age: 57
End: 2022-01-18

## 2022-02-24 ENCOUNTER — TELEPHONE (OUTPATIENT)
Dept: FAMILY MEDICINE CLINIC | Facility: CLINIC | Age: 57
End: 2022-02-24

## 2022-02-24 NOTE — TELEPHONE ENCOUNTER
Spoke to [employer]	[place], Rx sent on 1/6/22 #90 that patient has not picked up yet, they will process refill. MyChart sent to let pt know.

## 2022-02-24 NOTE — TELEPHONE ENCOUNTER
Pt's wife stopped by to ask if dr can refill her husbands blood pressure medication. He ran out of his medication. She would like a [employer] message when the refill is sent.

## 2022-03-15 ENCOUNTER — TELEPHONE (OUTPATIENT)
Dept: NUTRITION | Facility: HOSPITAL | Age: 57
End: 2022-03-15

## 2022-03-15 NOTE — TELEPHONE ENCOUNTER
Spoke with patients spouse regarding outpatient nutrition consultation. Pt has Medicaid-RD consult not covered. This RD will provide written materials via mail, encouraged to call with further questions. Thank you.

## 2022-04-25 ENCOUNTER — OFFICE VISIT (OUTPATIENT)
Dept: FAMILY MEDICINE CLINIC | Facility: CLINIC | Age: 57
End: 2022-04-25
Payer: MEDICAID

## 2022-04-25 VITALS
DIASTOLIC BLOOD PRESSURE: 82 MMHG | RESPIRATION RATE: 16 BRPM | BODY MASS INDEX: 29.9 KG/M2 | TEMPERATURE: 98 F | OXYGEN SATURATION: 99 % | HEIGHT: 74 IN | WEIGHT: 233 LBS | SYSTOLIC BLOOD PRESSURE: 110 MMHG | HEART RATE: 80 BPM

## 2022-04-25 DIAGNOSIS — N28.9 RENAL INSUFFICIENCY: ICD-10-CM

## 2022-04-25 DIAGNOSIS — I10 ESSENTIAL HYPERTENSION: ICD-10-CM

## 2022-04-25 DIAGNOSIS — R10.9 LEFT FLANK PAIN: Primary | ICD-10-CM

## 2022-04-25 DIAGNOSIS — Z12.5 SCREENING FOR MALIGNANT NEOPLASM OF PROSTATE: ICD-10-CM

## 2022-04-25 DIAGNOSIS — C64.1 CLEAR CELL CARCINOMA OF RIGHT KIDNEY (HCC): ICD-10-CM

## 2022-04-25 PROCEDURE — 99214 OFFICE O/P EST MOD 30 MIN: CPT | Performed by: FAMILY MEDICINE

## 2022-04-25 PROCEDURE — 3074F SYST BP LT 130 MM HG: CPT | Performed by: FAMILY MEDICINE

## 2022-04-25 PROCEDURE — 3079F DIAST BP 80-89 MM HG: CPT | Performed by: FAMILY MEDICINE

## 2022-04-25 PROCEDURE — 3008F BODY MASS INDEX DOCD: CPT | Performed by: FAMILY MEDICINE

## 2022-04-25 RX ORDER — CLOBETASOL PROPIONATE 0.5 MG/G
1 CREAM TOPICAL 2 TIMES DAILY
Qty: 60 G | Refills: 1 | Status: SHIPPED | OUTPATIENT
Start: 2022-04-25

## 2022-04-25 RX ORDER — CLOBETASOL PROPIONATE 0.5 MG/G
CREAM TOPICAL 2 TIMES DAILY
COMMUNITY
End: 2022-04-25

## 2022-05-05 ENCOUNTER — LAB ENCOUNTER (OUTPATIENT)
Dept: LAB | Age: 57
End: 2022-05-05
Attending: FAMILY MEDICINE
Payer: MEDICAID

## 2022-05-05 ENCOUNTER — TELEPHONE (OUTPATIENT)
Dept: FAMILY MEDICINE CLINIC | Facility: CLINIC | Age: 57
End: 2022-05-05

## 2022-05-05 ENCOUNTER — HOSPITAL ENCOUNTER (OUTPATIENT)
Dept: CT IMAGING | Age: 57
Discharge: HOME | End: 2022-05-05
Attending: FAMILY MEDICINE
Payer: MEDICAID

## 2022-05-05 DIAGNOSIS — I10 ESSENTIAL HYPERTENSION: ICD-10-CM

## 2022-05-05 DIAGNOSIS — Z12.5 SCREENING FOR MALIGNANT NEOPLASM OF PROSTATE: ICD-10-CM

## 2022-05-05 DIAGNOSIS — C64.1 CLEAR CELL CARCINOMA OF RIGHT KIDNEY (HCC): ICD-10-CM

## 2022-05-05 DIAGNOSIS — R10.9 LEFT FLANK PAIN: ICD-10-CM

## 2022-05-05 LAB
ALBUMIN SERPL-MCNC: 3.5 G/DL (ref 3.4–5)
ALBUMIN/GLOB SERPL: 1 {RATIO} (ref 1–2)
ALP LIVER SERPL-CCNC: 92 U/L
ALT SERPL-CCNC: 34 U/L
ANION GAP SERPL CALC-SCNC: 3 MMOL/L (ref 0–18)
AST SERPL-CCNC: 25 U/L (ref 15–37)
BASOPHILS # BLD AUTO: 0.12 X10(3) UL (ref 0–0.2)
BASOPHILS NFR BLD AUTO: 1.3 %
BILIRUB SERPL-MCNC: 0.5 MG/DL (ref 0.1–2)
BUN BLD-MCNC: 18 MG/DL (ref 7–18)
CALCIUM BLD-MCNC: 8.8 MG/DL (ref 8.5–10.1)
CHLORIDE SERPL-SCNC: 104 MMOL/L (ref 98–112)
CHOLEST SERPL-MCNC: 168 MG/DL (ref ?–200)
CO2 SERPL-SCNC: 31 MMOL/L (ref 21–32)
COMPLEXED PSA SERPL-MCNC: 1.59 NG/ML (ref ?–4)
CREAT BLD-MCNC: 1.32 MG/DL
EOSINOPHIL # BLD AUTO: 0.3 X10(3) UL (ref 0–0.7)
EOSINOPHIL NFR BLD AUTO: 3.2 %
ERYTHROCYTE [DISTWIDTH] IN BLOOD BY AUTOMATED COUNT: 12.9 %
FASTING PATIENT LIPID ANSWER: YES
FASTING STATUS PATIENT QL REPORTED: YES
GLOBULIN PLAS-MCNC: 3.6 G/DL (ref 2.8–4.4)
GLUCOSE BLD-MCNC: 131 MG/DL (ref 70–99)
HCT VFR BLD AUTO: 47.9 %
HDLC SERPL-MCNC: 52 MG/DL (ref 40–59)
HGB BLD-MCNC: 15.5 G/DL
IMM GRANULOCYTES # BLD AUTO: 0.01 X10(3) UL (ref 0–1)
IMM GRANULOCYTES NFR BLD: 0.1 %
LDLC SERPL CALC-MCNC: 95 MG/DL (ref ?–100)
LYMPHOCYTES # BLD AUTO: 3.53 X10(3) UL (ref 1–4)
LYMPHOCYTES NFR BLD AUTO: 38 %
MCH RBC QN AUTO: 29.2 PG (ref 26–34)
MCHC RBC AUTO-ENTMCNC: 32.4 G/DL (ref 31–37)
MCV RBC AUTO: 90.2 FL
MONOCYTES # BLD AUTO: 0.65 X10(3) UL (ref 0.1–1)
MONOCYTES NFR BLD AUTO: 7 %
NEUTROPHILS # BLD AUTO: 4.67 X10 (3) UL (ref 1.5–7.7)
NEUTROPHILS # BLD AUTO: 4.67 X10(3) UL (ref 1.5–7.7)
NEUTROPHILS NFR BLD AUTO: 50.4 %
NONHDLC SERPL-MCNC: 116 MG/DL (ref ?–130)
OSMOLALITY SERPL CALC.SUM OF ELEC: 290 MOSM/KG (ref 275–295)
PLATELET # BLD AUTO: 318 10(3)UL (ref 150–450)
POTASSIUM SERPL-SCNC: 3.9 MMOL/L (ref 3.5–5.1)
PROT SERPL-MCNC: 7.1 G/DL (ref 6.4–8.2)
RBC # BLD AUTO: 5.31 X10(6)UL
SODIUM SERPL-SCNC: 138 MMOL/L (ref 136–145)
TRIGL SERPL-MCNC: 115 MG/DL (ref 30–149)
VLDLC SERPL CALC-MCNC: 19 MG/DL (ref 0–30)
WBC # BLD AUTO: 9.3 X10(3) UL (ref 4–11)

## 2022-05-05 PROCEDURE — 72192 CT PELVIS W/O DYE: CPT | Performed by: FAMILY MEDICINE

## 2022-05-05 PROCEDURE — 80061 LIPID PANEL: CPT

## 2022-05-05 PROCEDURE — 80053 COMPREHEN METABOLIC PANEL: CPT

## 2022-05-05 PROCEDURE — 36415 COLL VENOUS BLD VENIPUNCTURE: CPT

## 2022-05-05 PROCEDURE — 85025 COMPLETE CBC W/AUTO DIFF WBC: CPT

## 2022-05-05 NOTE — TELEPHONE ENCOUNTER
- Pt stopped in office after having CT of pelvis today and states [place] [address]. Needs prior authorization pre insurance company.     Please call patient with any questions [phone]

## 2022-05-10 ENCOUNTER — OFFICE VISIT (OUTPATIENT)
Dept: NEPHROLOGY | Facility: CLINIC | Age: 57
End: 2022-05-10
Payer: MEDICAID

## 2022-05-10 VITALS — WEIGHT: 236 LBS | SYSTOLIC BLOOD PRESSURE: 132 MMHG | DIASTOLIC BLOOD PRESSURE: 80 MMHG | BODY MASS INDEX: 30 KG/M2

## 2022-05-10 DIAGNOSIS — N18.30 STAGE 3 CHRONIC KIDNEY DISEASE, UNSPECIFIED WHETHER STAGE 3A OR 3B CKD (HCC): Primary | ICD-10-CM

## 2022-05-10 PROCEDURE — 3079F DIAST BP 80-89 MM HG: CPT | Performed by: INTERNAL MEDICINE

## 2022-05-10 PROCEDURE — 3075F SYST BP GE 130 - 139MM HG: CPT | Performed by: INTERNAL MEDICINE

## 2022-05-10 PROCEDURE — 99204 OFFICE O/P NEW MOD 45 MIN: CPT | Performed by: INTERNAL MEDICINE

## 2022-06-04 RX ORDER — CLOBETASOL PROPIONATE 0.5 MG/G
1 CREAM TOPICAL 2 TIMES DAILY
Qty: 60 G | Refills: 1 | Status: SHIPPED | OUTPATIENT
Start: 2022-06-04 | End: 2022-06-06

## 2022-06-04 RX ORDER — HYDROCHLOROTHIAZIDE 25 MG/1
TABLET ORAL
Qty: 90 TABLET | Refills: 0 | Status: SHIPPED | OUTPATIENT
Start: 2022-06-04

## 2022-06-06 RX ORDER — PAROXETINE HYDROCHLORIDE 20 MG/1
20 TABLET, FILM COATED ORAL EVERY MORNING
Qty: 90 TABLET | Refills: 1 | Status: SHIPPED | OUTPATIENT
Start: 2022-06-06

## 2022-06-06 RX ORDER — CLOBETASOL PROPIONATE 0.5 MG/G
1 CREAM TOPICAL 2 TIMES DAILY
Qty: 60 G | Refills: 1 | Status: SHIPPED | OUTPATIENT
Start: 2022-06-06

## 2022-06-23 ENCOUNTER — HOSPITAL ENCOUNTER (OUTPATIENT)
Dept: GENERAL RADIOLOGY | Age: 57
Discharge: HOME | End: 2022-06-23
Attending: FAMILY MEDICINE
Payer: MEDICAID

## 2022-06-23 ENCOUNTER — OFFICE VISIT (OUTPATIENT)
Dept: FAMILY MEDICINE CLINIC | Facility: CLINIC | Age: 57
End: 2022-06-23
Payer: MEDICAID

## 2022-06-23 VITALS
RESPIRATION RATE: 14 BRPM | HEIGHT: 74 IN | BODY MASS INDEX: 30.16 KG/M2 | WEIGHT: 235 LBS | SYSTOLIC BLOOD PRESSURE: 126 MMHG | DIASTOLIC BLOOD PRESSURE: 74 MMHG | TEMPERATURE: 98 F

## 2022-06-23 DIAGNOSIS — M25.561 ACUTE PAIN OF RIGHT KNEE: ICD-10-CM

## 2022-06-23 DIAGNOSIS — Z12.11 COLON CANCER SCREENING: ICD-10-CM

## 2022-06-23 DIAGNOSIS — M76.51 PATELLAR TENDINITIS OF RIGHT KNEE: ICD-10-CM

## 2022-06-23 DIAGNOSIS — M76.51 PATELLAR TENDINITIS OF RIGHT KNEE: Primary | ICD-10-CM

## 2022-06-23 PROCEDURE — 99213 OFFICE O/P EST LOW 20 MIN: CPT | Performed by: FAMILY MEDICINE

## 2022-06-23 PROCEDURE — 3008F BODY MASS INDEX DOCD: CPT | Performed by: FAMILY MEDICINE

## 2022-06-23 PROCEDURE — 3078F DIAST BP <80 MM HG: CPT | Performed by: FAMILY MEDICINE

## 2022-06-23 PROCEDURE — 3074F SYST BP LT 130 MM HG: CPT | Performed by: FAMILY MEDICINE

## 2022-06-23 RX ORDER — METHYLPREDNISOLONE 4 MG/1
TABLET ORAL
Qty: 1 EACH | Refills: 0 | Status: SHIPPED | OUTPATIENT
Start: 2022-06-23

## 2022-06-24 ENCOUNTER — HOSPITAL ENCOUNTER (OUTPATIENT)
Dept: GENERAL RADIOLOGY | Age: 57
Discharge: HOME | End: 2022-06-24
Attending: FAMILY MEDICINE
Payer: MEDICAID

## 2022-06-24 PROCEDURE — 73560 X-RAY EXAM OF KNEE 1 OR 2: CPT | Performed by: FAMILY MEDICINE

## 2022-06-30 ENCOUNTER — TELEPHONE (OUTPATIENT)
Dept: SURGERY | Facility: CLINIC | Age: 57
End: 2022-06-30

## 2022-06-30 ENCOUNTER — PATIENT MESSAGE (OUTPATIENT)
Dept: FAMILY MEDICINE CLINIC | Facility: CLINIC | Age: 57
End: 2022-06-30

## 2022-06-30 DIAGNOSIS — M76.51 PATELLAR TENDINITIS OF RIGHT KNEE: Primary | ICD-10-CM

## 2022-06-30 NOTE — TELEPHONE ENCOUNTER
Patient scheduled to see me in a couple weeks. H/o nephrectomy at [facility] ([facility]) in 2020. Can you ask him to either bring Urologic records to his appointment or have records faxed to our office?     Thanks    [facility]

## 2022-07-01 ENCOUNTER — TELEPHONE (OUTPATIENT)
Dept: ORTHOPEDICS CLINIC | Facility: CLINIC | Age: 57
End: 2022-07-01

## 2022-07-01 NOTE — TELEPHONE ENCOUNTER
Imaging was completed for this patient for a RT KNEE, but it needs to be reviewed to see if additional imaging is needed. If so, please enter the appropriate RX and reply to this message;  I will know to come in before their appointment to complete the additional imaging. Thank you!     Future Appointments   Date Time Provider Rony Olivas   7/5/2022  9:30 AM Deven Shelton PA-C [facility]

## 2022-07-05 ENCOUNTER — OFFICE VISIT (OUTPATIENT)
Dept: ORTHOPEDICS CLINIC | Facility: CLINIC | Age: 57
End: 2022-07-05
Payer: MEDICAID

## 2022-07-05 VITALS — OXYGEN SATURATION: 97 % | BODY MASS INDEX: 30.06 KG/M2 | HEIGHT: 74 IN | WEIGHT: 234.19 LBS | HEART RATE: 80 BPM

## 2022-07-05 DIAGNOSIS — S83.411A SPRAIN OF MEDIAL COLLATERAL LIGAMENT OF RIGHT KNEE, INITIAL ENCOUNTER: Primary | ICD-10-CM

## 2022-07-05 PROCEDURE — 99203 OFFICE O/P NEW LOW 30 MIN: CPT | Performed by: PHYSICIAN ASSISTANT

## 2022-07-05 PROCEDURE — 3008F BODY MASS INDEX DOCD: CPT | Performed by: PHYSICIAN ASSISTANT

## 2022-07-11 ENCOUNTER — OFFICE VISIT (OUTPATIENT)
Dept: SURGERY | Facility: CLINIC | Age: 57
End: 2022-07-11
Payer: MEDICAID

## 2022-07-11 DIAGNOSIS — Z80.42 FAMILY HISTORY OF PROSTATE CANCER IN FATHER: ICD-10-CM

## 2022-07-11 DIAGNOSIS — N52.9 ERECTILE DYSFUNCTION, UNSPECIFIED ERECTILE DYSFUNCTION TYPE: ICD-10-CM

## 2022-07-11 DIAGNOSIS — C64.1 CLEAR CELL CARCINOMA OF RIGHT KIDNEY (HCC): Primary | ICD-10-CM

## 2022-07-11 LAB
APPEARANCE: CLEAR
BILIRUBIN: NEGATIVE
GLUCOSE (URINE DIPSTICK): NEGATIVE MG/DL
KETONES (URINE DIPSTICK): NEGATIVE MG/DL
LEUKOCYTES: NEGATIVE
MULTISTIX LOT#: NORMAL NUMERIC
NITRITE, URINE: NEGATIVE
OCCULT BLOOD: NEGATIVE
PH, URINE: 7 (ref 4.5–8)
PROTEIN (URINE DIPSTICK): NEGATIVE MG/DL
SPECIFIC GRAVITY: 1.02 (ref 1–1.03)
URINE-COLOR: YELLOW
UROBILINOGEN,SEMI-QN: 0.2 MG/DL (ref 0–1.9)

## 2022-07-11 RX ORDER — SILDENAFIL 100 MG/1
100 TABLET, FILM COATED ORAL
Qty: 30 TABLET | Refills: 5 | Status: SHIPPED | OUTPATIENT
Start: 2022-07-11

## 2022-07-18 ENCOUNTER — TELEPHONE (OUTPATIENT)
Dept: SURGERY | Facility: CLINIC | Age: 57
End: 2022-07-18

## 2022-07-18 NOTE — TELEPHONE ENCOUNTER
Second call. RN called patient, Sophie Newsome [phone]   In response to his call/inquiry about the CT PA. No answer. Left message to of PA updates. May call or follow up with [facility] at [phone].

## 2022-07-18 NOTE — TELEPHONE ENCOUNTER
RN called patientLeeanna [phone]   In response to his call/inquiry about the CT PA. No answer. Left message to of PA updates. May call or follow up with [facility] at [phone]. Referral Notes  Number of Notes: 5    .   Type Date User Summary Attachment   Prior Authorization Pursuit 07/15/2022 11:16 AM Tray Robledo - -   Note    auth pending

## 2022-08-02 ENCOUNTER — TELEPHONE (OUTPATIENT)
Dept: SURGERY | Facility: CLINIC | Age: 57
End: 2022-08-02

## 2022-08-02 NOTE — TELEPHONE ENCOUNTER
Per spouse, calling to clarify procedure she is to cancel with [facility]. Please advise

## 2022-08-03 ENCOUNTER — TELEPHONE (OUTPATIENT)
Dept: SURGERY | Facility: CLINIC | Age: 57
End: 2022-08-03

## 2022-08-03 ENCOUNTER — OFFICE VISIT (OUTPATIENT)
Dept: PHYSICAL THERAPY | Age: 57
End: 2022-08-03
Attending: FAMILY MEDICINE
Payer: MEDICAID

## 2022-08-03 DIAGNOSIS — C64.9 RENAL CELL CARCINOMA, UNSPECIFIED LATERALITY (HCC): Primary | ICD-10-CM

## 2022-08-03 DIAGNOSIS — M25.561 ACUTE PAIN OF RIGHT KNEE: ICD-10-CM

## 2022-08-03 DIAGNOSIS — M76.51 PATELLAR TENDINITIS OF RIGHT KNEE: ICD-10-CM

## 2022-08-03 PROCEDURE — 97161 PT EVAL LOW COMPLEX 20 MIN: CPT

## 2022-08-03 PROCEDURE — 97110 THERAPEUTIC EXERCISES: CPT

## 2022-08-03 NOTE — TELEPHONE ENCOUNTER
Per [place] CPT code 50444 was denied by insurance, asking if separate order can be placed for this code. Please advise thank you.

## 2022-08-04 NOTE — TELEPHONE ENCOUNTER
Order for CT chest completed. This should be verified with insurance that CPT code is approved as [id number]. Please verify with managed care.

## 2022-08-04 NOTE — TELEPHONE ENCOUNTER
Марина Foster called. CT chest was approved. CT of abd/pelvis was denied. She is asking if new order for CT chest only can be placed. Pt is scheduled for tomorrow.  MACY please advise, thanks

## 2022-08-05 ENCOUNTER — OFFICE VISIT (OUTPATIENT)
Dept: PHYSICAL THERAPY | Age: 57
End: 2022-08-05
Attending: FAMILY MEDICINE
Payer: MEDICAID

## 2022-08-05 ENCOUNTER — TELEPHONE (OUTPATIENT)
Dept: SURGERY | Facility: CLINIC | Age: 57
End: 2022-08-05

## 2022-08-05 ENCOUNTER — HOSPITAL ENCOUNTER (OUTPATIENT)
Dept: CT IMAGING | Age: 57
Discharge: HOME | End: 2022-08-05
Attending: UROLOGY
Payer: MEDICAID

## 2022-08-05 DIAGNOSIS — C64.1 CLEAR CELL CARCINOMA OF RIGHT KIDNEY (HCC): ICD-10-CM

## 2022-08-05 DIAGNOSIS — C64.9 RENAL CELL CARCINOMA, UNSPECIFIED LATERALITY (HCC): ICD-10-CM

## 2022-08-05 DIAGNOSIS — M25.561 ACUTE PAIN OF RIGHT KNEE: ICD-10-CM

## 2022-08-05 DIAGNOSIS — M76.51 PATELLAR TENDINITIS OF RIGHT KNEE: ICD-10-CM

## 2022-08-05 LAB
CREAT BLD-MCNC: 1.3 MG/DL
GFR SERPLBLD BASED ON 1.73 SQ M-ARVRAT: 64 ML/MIN/1.73M2 (ref 60–?)

## 2022-08-05 PROCEDURE — 82565 ASSAY OF CREATININE: CPT

## 2022-08-05 PROCEDURE — 71260 CT THORAX DX C+: CPT | Performed by: PHYSICIAN ASSISTANT

## 2022-08-05 PROCEDURE — 97110 THERAPEUTIC EXERCISES: CPT

## 2022-08-05 NOTE — TELEPHONE ENCOUNTER
Pt calling states PA was not done for his CT scan states needs to be called case # [id number] please advise

## 2022-08-08 NOTE — TELEPHONE ENCOUNTER
RN called patient, Adelaide Watkins [phone]   in response to his inquiry about the CT. RN explained that original order (or case number of ) CT chest/abdomen on 7/11 was denied by insurance and a new order of CT chest was placed on 8/5. Patient explained that he received a letter of approval of the CT. Again, RN explained that CT chest was the only thing approved. Patient verbalized understanding and agreed to plans.

## 2022-08-10 ENCOUNTER — APPOINTMENT (OUTPATIENT)
Dept: PHYSICAL THERAPY | Age: 57
End: 2022-08-10
Attending: FAMILY MEDICINE
Payer: MEDICAID

## 2022-08-12 ENCOUNTER — OFFICE VISIT (OUTPATIENT)
Dept: PHYSICAL THERAPY | Age: 57
End: 2022-08-12
Attending: FAMILY MEDICINE
Payer: MEDICAID

## 2022-08-12 DIAGNOSIS — M76.51 PATELLAR TENDINITIS OF RIGHT KNEE: ICD-10-CM

## 2022-08-12 DIAGNOSIS — M25.561 ACUTE PAIN OF RIGHT KNEE: ICD-10-CM

## 2022-08-12 PROCEDURE — 97110 THERAPEUTIC EXERCISES: CPT

## 2022-08-15 ENCOUNTER — TELEPHONE (OUTPATIENT)
Dept: PHYSICAL THERAPY | Age: 57
End: 2022-08-15

## 2022-08-15 ENCOUNTER — APPOINTMENT (OUTPATIENT)
Dept: PHYSICAL THERAPY | Age: 57
End: 2022-08-15
Attending: FAMILY MEDICINE
Payer: MEDICAID

## 2022-08-17 ENCOUNTER — OFFICE VISIT (OUTPATIENT)
Dept: PHYSICAL THERAPY | Age: 57
End: 2022-08-17
Attending: FAMILY MEDICINE
Payer: MEDICAID

## 2022-08-17 PROCEDURE — 97110 THERAPEUTIC EXERCISES: CPT

## 2022-08-29 ENCOUNTER — APPOINTMENT (OUTPATIENT)
Dept: PHYSICAL THERAPY | Age: 57
End: 2022-08-29
Attending: FAMILY MEDICINE
Payer: MEDICAID

## 2022-08-31 RX ORDER — HYDROCHLOROTHIAZIDE 25 MG/1
25 TABLET ORAL DAILY
Qty: 90 TABLET | Refills: 0 | Status: SHIPPED | OUTPATIENT
Start: 2022-08-31

## 2022-09-07 ENCOUNTER — HOSPITAL ENCOUNTER (OUTPATIENT)
Dept: CT IMAGING | Age: 57
Discharge: HOME | End: 2022-09-07
Attending: PHYSICIAN ASSISTANT
Payer: MEDICAID

## 2022-09-07 DIAGNOSIS — C64.9 RENAL CELL CARCINOMA, UNSPECIFIED LATERALITY (HCC): ICD-10-CM

## 2022-09-07 LAB
CREAT BLD-MCNC: 1.3 MG/DL
GFR SERPLBLD BASED ON 1.73 SQ M-ARVRAT: 64 ML/MIN/1.73M2 (ref 60–?)

## 2022-09-07 PROCEDURE — 74170 CT ABD WO CNTRST FLWD CNTRST: CPT | Performed by: PHYSICIAN ASSISTANT

## 2022-09-07 PROCEDURE — 82565 ASSAY OF CREATININE: CPT

## 2022-09-12 ENCOUNTER — OFFICE VISIT (OUTPATIENT)
Dept: ORTHOPEDICS CLINIC | Facility: CLINIC | Age: 57
End: 2022-09-12
Payer: MEDICAID

## 2022-09-12 ENCOUNTER — OFFICE VISIT (OUTPATIENT)
Dept: FAMILY MEDICINE CLINIC | Facility: CLINIC | Age: 57
End: 2022-09-12
Payer: MEDICAID

## 2022-09-12 ENCOUNTER — TELEPHONE (OUTPATIENT)
Dept: ORTHOPEDICS CLINIC | Facility: CLINIC | Age: 57
End: 2022-09-12

## 2022-09-12 VITALS
OXYGEN SATURATION: 98 % | WEIGHT: 240 LBS | HEART RATE: 72 BPM | RESPIRATION RATE: 16 BRPM | TEMPERATURE: 98 F | HEIGHT: 74 IN | DIASTOLIC BLOOD PRESSURE: 78 MMHG | BODY MASS INDEX: 30.8 KG/M2 | SYSTOLIC BLOOD PRESSURE: 128 MMHG

## 2022-09-12 DIAGNOSIS — S83.411D SPRAIN OF MEDIAL COLLATERAL LIGAMENT OF RIGHT KNEE, SUBSEQUENT ENCOUNTER: Primary | ICD-10-CM

## 2022-09-12 DIAGNOSIS — F41.1 GAD (GENERALIZED ANXIETY DISORDER): ICD-10-CM

## 2022-09-12 DIAGNOSIS — S83.411A SPRAIN OF MEDIAL COLLATERAL LIGAMENT OF RIGHT KNEE, INITIAL ENCOUNTER: Primary | ICD-10-CM

## 2022-09-12 DIAGNOSIS — Z90.5 STATUS POST NEPHRECTOMY: ICD-10-CM

## 2022-09-12 DIAGNOSIS — M25.561 CHRONIC PAIN OF RIGHT KNEE: ICD-10-CM

## 2022-09-12 DIAGNOSIS — Z01.818 PREOP EXAMINATION: Primary | ICD-10-CM

## 2022-09-12 DIAGNOSIS — G89.29 CHRONIC PAIN OF RIGHT KNEE: ICD-10-CM

## 2022-09-12 RX ORDER — PAROXETINE HYDROCHLORIDE 20 MG/1
20 TABLET, FILM COATED ORAL EVERY MORNING
Qty: 90 TABLET | Refills: 1 | Status: SHIPPED | OUTPATIENT
Start: 2022-09-12

## 2022-09-12 RX ORDER — TRIAMCINOLONE ACETONIDE 40 MG/ML
40 INJECTION, SUSPENSION INTRA-ARTICULAR; INTRAMUSCULAR ONCE
Status: SHIPPED | OUTPATIENT
Start: 2022-09-12

## 2022-09-12 NOTE — PROGRESS NOTES
[facility] Progress Note    Subjective: Patient returns to clinic after last being seen on 7/5/2022 for right knee pain consistent with possible MCL sprain or medial hamstring tendinitis. He returns to clinic because he continues to experience this pain despite physical therapy. He had substantial improvement with a Medrol Dosepak previously and he needs to avoid NSAIDs due to reduced kidney function. He is interested in extending his visits with physical therapy. No recent injuries. Objective: Patient can fully extend the knee to 0 degrees and flex to approximately 120 degrees without pain. No significant tenderness to palpation of the medial or lateral joint lines, however he does continue to experience pain along the MCL. No laxity with anterior/posterior drawer, valgus or varus stress at 0 or 30 degrees. Assessment/Plan: Patient presents for follow-up with persistent medial knee pain despite physical therapy. I discussed potential intra-articular cortisone injection in the event that there is any intra-articular pathology continuing to cause pain. The patient expressed interest in trying an injection, and one was administered, please see separate procedure note for additional details. I sent in a new referral to physical therapy to allow the patient to continue to participate. If no significant improvement, would recommend weightbearing films of his right knee to assess for arthritis prior to any MRI of the right knee. All questions were invited and answered. He is happy with the plan will follow as advised    [address] Orthopedic [facility]    This note was dictated using Dragon software. While it was briefly proofread prior to completion, some grammatical, spelling, and word choice errors due to dictation may still occur.

## 2022-09-12 NOTE — PROCEDURES
After informed consent, the patient's right knee was marked, locally anesthetized with skin refrigerant, prepped with topical antiseptic, and injected with a mixture of 1mL 40mg/mL Kenalog, 2mL 1% lidocaine and 2mL 0.5% marcaine through the inferolateral portal.  A band-aid was applied. The patient tolerated the procedure well.     Archie Segovia PA-C  [address] Orthopedic Surgery

## 2022-10-03 ENCOUNTER — OFFICE VISIT (OUTPATIENT)
Dept: PHYSICAL THERAPY | Age: 57
End: 2022-10-03
Attending: PHYSICIAN ASSISTANT
Payer: MEDICAID

## 2022-10-03 DIAGNOSIS — S83.411D SPRAIN OF MEDIAL COLLATERAL LIGAMENT OF RIGHT KNEE, SUBSEQUENT ENCOUNTER: ICD-10-CM

## 2022-10-03 PROCEDURE — 97110 THERAPEUTIC EXERCISES: CPT

## 2022-10-03 PROCEDURE — 97161 PT EVAL LOW COMPLEX 20 MIN: CPT

## 2022-10-10 ENCOUNTER — TELEPHONE (OUTPATIENT)
Dept: PHYSICAL THERAPY | Facility: HOSPITAL | Age: 57
End: 2022-10-10

## 2022-10-12 ENCOUNTER — TELEPHONE (OUTPATIENT)
Dept: PHYSICAL THERAPY | Facility: HOSPITAL | Age: 57
End: 2022-10-12

## 2022-10-17 ENCOUNTER — APPOINTMENT (OUTPATIENT)
Dept: PHYSICAL THERAPY | Age: 57
End: 2022-10-17
Attending: PHYSICIAN ASSISTANT
Payer: MEDICAID

## 2022-10-17 NOTE — PATIENT INSTRUCTIONS
Thank you for choosing Kelly Steen MD at [place] [address]  To Do: Logan Em  1. Please take meds as directed. [facility] is located in [address]. Monday, Tuesday & Friday – 8 a.m. to 4 p.m. Wednesday, Thursday – 7 a.m. to 3 p.m. those potential risks and we strive to make you healthier and to improve your quality of life.     Referrals, and Radiology Information:    If your insurance requires a referral to a specialist, please allow 5 business days to process your referral request. pain relievers include acetaminophen and anti-inflammatory medicines, which includes aspirin, naproxen, or ibuprofen. They can help ease discomfort. Some also reduce swelling. · Tell your healthcare provider about any medicines you are already taking.   · No

## 2022-10-18 ENCOUNTER — APPOINTMENT (OUTPATIENT)
Dept: PHYSICAL THERAPY | Age: 57
End: 2022-10-18
Attending: PHYSICIAN ASSISTANT
Payer: MEDICAID

## 2022-10-18 ENCOUNTER — TELEPHONE (OUTPATIENT)
Dept: PHYSICAL THERAPY | Facility: HOSPITAL | Age: 57
End: 2022-10-18

## 2022-10-25 ENCOUNTER — APPOINTMENT (OUTPATIENT)
Dept: PHYSICAL THERAPY | Age: 57
End: 2022-10-25
Attending: PHYSICIAN ASSISTANT
Payer: MEDICAID

## 2022-10-28 ENCOUNTER — OFFICE VISIT (OUTPATIENT)
Dept: PHYSICAL THERAPY | Age: 57
End: 2022-10-28
Attending: PHYSICIAN ASSISTANT
Payer: MEDICAID

## 2022-10-28 DIAGNOSIS — S83.411D SPRAIN OF MEDIAL COLLATERAL LIGAMENT OF RIGHT KNEE, SUBSEQUENT ENCOUNTER: ICD-10-CM

## 2022-10-28 PROCEDURE — 97110 THERAPEUTIC EXERCISES: CPT

## 2022-10-31 ENCOUNTER — APPOINTMENT (OUTPATIENT)
Dept: PHYSICAL THERAPY | Age: 57
End: 2022-10-31
Attending: PHYSICIAN ASSISTANT
Payer: MEDICAID

## 2022-11-02 ENCOUNTER — OFFICE VISIT (OUTPATIENT)
Dept: PHYSICAL THERAPY | Age: 57
End: 2022-11-02
Attending: PHYSICIAN ASSISTANT
Payer: MEDICAID

## 2022-11-02 DIAGNOSIS — S83.411D SPRAIN OF MEDIAL COLLATERAL LIGAMENT OF RIGHT KNEE, SUBSEQUENT ENCOUNTER: ICD-10-CM

## 2022-11-02 PROCEDURE — 97110 THERAPEUTIC EXERCISES: CPT

## 2022-11-08 ENCOUNTER — TELEPHONE (OUTPATIENT)
Dept: PHYSICAL THERAPY | Facility: HOSPITAL | Age: 57
End: 2022-11-08

## 2022-11-08 ENCOUNTER — APPOINTMENT (OUTPATIENT)
Dept: PHYSICAL THERAPY | Age: 57
End: 2022-11-08
Attending: PHYSICIAN ASSISTANT
Payer: MEDICAID

## 2022-11-10 ENCOUNTER — OFFICE VISIT (OUTPATIENT)
Dept: PHYSICAL THERAPY | Age: 57
End: 2022-11-10
Attending: PHYSICIAN ASSISTANT
Payer: MEDICAID

## 2022-11-10 PROCEDURE — 97110 THERAPEUTIC EXERCISES: CPT

## 2022-11-16 ENCOUNTER — LAB ENCOUNTER (OUTPATIENT)
Dept: LAB | Age: 57
End: 2022-11-16
Attending: INTERNAL MEDICINE
Payer: MEDICAID

## 2022-11-16 DIAGNOSIS — N18.30 STAGE 3 CHRONIC KIDNEY DISEASE, UNSPECIFIED WHETHER STAGE 3A OR 3B CKD (HCC): ICD-10-CM

## 2022-11-16 LAB
ANION GAP SERPL CALC-SCNC: 4 MMOL/L (ref 0–18)
BASOPHILS # BLD AUTO: 0.19 X10(3) UL (ref 0–0.2)
BASOPHILS NFR BLD AUTO: 1.9 %
BILIRUB UR QL STRIP.AUTO: NEGATIVE
BUN BLD-MCNC: 21 MG/DL (ref 7–18)
CALCIUM BLD-MCNC: 9 MG/DL (ref 8.5–10.1)
CHLORIDE SERPL-SCNC: 107 MMOL/L (ref 98–112)
CLARITY UR REFRACT.AUTO: CLEAR
CO2 SERPL-SCNC: 30 MMOL/L (ref 21–32)
COLOR UR AUTO: YELLOW
CREAT BLD-MCNC: 1.53 MG/DL
CREAT UR-SCNC: 368 MG/DL
EOSINOPHIL # BLD AUTO: 0.33 X10(3) UL (ref 0–0.7)
EOSINOPHIL NFR BLD AUTO: 3.2 %
ERYTHROCYTE [DISTWIDTH] IN BLOOD BY AUTOMATED COUNT: 13.2 %
FASTING STATUS PATIENT QL REPORTED: YES
GFR SERPLBLD BASED ON 1.73 SQ M-ARVRAT: 53 ML/MIN/1.73M2 (ref 60–?)
GLUCOSE BLD-MCNC: 146 MG/DL (ref 70–99)
GLUCOSE UR STRIP.AUTO-MCNC: NEGATIVE MG/DL
HCT VFR BLD AUTO: 50 %
HGB BLD-MCNC: 16.6 G/DL
IMM GRANULOCYTES # BLD AUTO: 0.02 X10(3) UL (ref 0–1)
IMM GRANULOCYTES NFR BLD: 0.2 %
LEUKOCYTE ESTERASE UR QL STRIP.AUTO: NEGATIVE
LYMPHOCYTES # BLD AUTO: 3.62 X10(3) UL (ref 1–4)
LYMPHOCYTES NFR BLD AUTO: 35.4 %
MAGNESIUM SERPL-MCNC: 2.3 MG/DL (ref 1.6–2.6)
MCH RBC QN AUTO: 30.1 PG (ref 26–34)
MCHC RBC AUTO-ENTMCNC: 33.2 G/DL (ref 31–37)
MCV RBC AUTO: 90.6 FL
MONOCYTES # BLD AUTO: 0.68 X10(3) UL (ref 0.1–1)
MONOCYTES NFR BLD AUTO: 6.7 %
NEUTROPHILS # BLD AUTO: 5.38 X10 (3) UL (ref 1.5–7.7)
NEUTROPHILS # BLD AUTO: 5.38 X10(3) UL (ref 1.5–7.7)
NEUTROPHILS NFR BLD AUTO: 52.6 %
NITRITE UR QL STRIP.AUTO: NEGATIVE
OSMOLALITY SERPL CALC.SUM OF ELEC: 298 MOSM/KG (ref 275–295)
PH UR STRIP.AUTO: 5 [PH] (ref 5–8)
PHOSPHATE SERPL-MCNC: 2.7 MG/DL (ref 2.5–4.9)
PLATELET # BLD AUTO: 357 10(3)UL (ref 150–450)
POTASSIUM SERPL-SCNC: 3.9 MMOL/L (ref 3.5–5.1)
PROT UR STRIP.AUTO-MCNC: 30 MG/DL
PROT UR-MCNC: 48.6 MG/DL
RBC # BLD AUTO: 5.52 X10(6)UL
RBC UR QL AUTO: NEGATIVE
SODIUM SERPL-SCNC: 141 MMOL/L (ref 136–145)
SP GR UR STRIP.AUTO: 1.03 (ref 1–1.03)
UROBILINOGEN UR STRIP.AUTO-MCNC: <2 MG/DL
VIT D+METAB SERPL-MCNC: 23.4 NG/ML (ref 30–100)
WBC # BLD AUTO: 10.2 X10(3) UL (ref 4–11)

## 2022-11-16 PROCEDURE — 80048 BASIC METABOLIC PNL TOTAL CA: CPT

## 2022-11-16 PROCEDURE — 84156 ASSAY OF PROTEIN URINE: CPT

## 2022-11-16 PROCEDURE — 82570 ASSAY OF URINE CREATININE: CPT

## 2022-11-16 PROCEDURE — 81001 URINALYSIS AUTO W/SCOPE: CPT

## 2022-11-16 PROCEDURE — 85025 COMPLETE CBC W/AUTO DIFF WBC: CPT

## 2022-11-16 PROCEDURE — 84100 ASSAY OF PHOSPHORUS: CPT

## 2022-11-16 PROCEDURE — 82306 VITAMIN D 25 HYDROXY: CPT

## 2022-11-16 PROCEDURE — 83735 ASSAY OF MAGNESIUM: CPT

## 2022-11-16 PROCEDURE — 36415 COLL VENOUS BLD VENIPUNCTURE: CPT

## 2022-11-22 ENCOUNTER — OFFICE VISIT (OUTPATIENT)
Dept: PHYSICAL THERAPY | Age: 57
End: 2022-11-22
Attending: PHYSICIAN ASSISTANT
Payer: MEDICAID

## 2022-11-22 ENCOUNTER — OFFICE VISIT (OUTPATIENT)
Dept: NEPHROLOGY | Facility: CLINIC | Age: 57
End: 2022-11-22
Payer: MEDICAID

## 2022-11-22 VITALS — BODY MASS INDEX: 31 KG/M2 | SYSTOLIC BLOOD PRESSURE: 128 MMHG | WEIGHT: 241 LBS | DIASTOLIC BLOOD PRESSURE: 76 MMHG

## 2022-11-22 DIAGNOSIS — N18.30 STAGE 3 CHRONIC KIDNEY DISEASE, UNSPECIFIED WHETHER STAGE 3A OR 3B CKD (HCC): Primary | ICD-10-CM

## 2022-11-22 PROCEDURE — 97110 THERAPEUTIC EXERCISES: CPT

## 2022-11-29 ENCOUNTER — APPOINTMENT (OUTPATIENT)
Dept: PHYSICAL THERAPY | Age: 57
End: 2022-11-29
Attending: PHYSICIAN ASSISTANT
Payer: MEDICAID

## 2022-12-01 ENCOUNTER — TELEPHONE (OUTPATIENT)
Dept: PHYSICAL THERAPY | Age: 57
End: 2022-12-01

## 2022-12-12 ENCOUNTER — TELEPHONE (OUTPATIENT)
Dept: PHYSICAL THERAPY | Age: 57
End: 2022-12-12

## 2022-12-19 ENCOUNTER — TELEPHONE (OUTPATIENT)
Dept: PHYSICAL THERAPY | Age: 57
End: 2022-12-19

## 2023-01-03 ENCOUNTER — OFFICE VISIT (OUTPATIENT)
Dept: PHYSICAL THERAPY | Age: 58
End: 2023-01-03
Attending: PHYSICIAN ASSISTANT
Payer: MEDICAID

## 2023-01-03 PROCEDURE — 97110 THERAPEUTIC EXERCISES: CPT

## 2023-02-20 ENCOUNTER — OFFICE VISIT (OUTPATIENT)
Dept: ORTHOPEDICS CLINIC | Facility: CLINIC | Age: 58
End: 2023-02-20
Payer: MEDICAID

## 2023-02-20 VITALS — WEIGHT: 241 LBS | BODY MASS INDEX: 30.93 KG/M2 | HEIGHT: 74 IN | OXYGEN SATURATION: 97 % | HEART RATE: 82 BPM

## 2023-02-20 DIAGNOSIS — M25.561 CHRONIC PAIN OF RIGHT KNEE: Primary | ICD-10-CM

## 2023-02-20 DIAGNOSIS — G89.29 CHRONIC PAIN OF RIGHT KNEE: Primary | ICD-10-CM

## 2023-02-20 RX ORDER — TRIAMCINOLONE ACETONIDE 40 MG/ML
40 INJECTION, SUSPENSION INTRA-ARTICULAR; INTRAMUSCULAR ONCE
Status: COMPLETED | OUTPATIENT
Start: 2023-02-20 | End: 2023-02-20

## 2023-02-20 RX ADMIN — TRIAMCINOLONE ACETONIDE 40 MG: 40 INJECTION, SUSPENSION INTRA-ARTICULAR; INTRAMUSCULAR at 08:50:00

## 2023-02-20 NOTE — PROCEDURES
Patient reports significant improvement from knee injection provided in September. Discussed that I suspect his medial knee pain was from symptomatic arthritis. After informed consent, the patient's right knee was marked, locally anesthetized with skin refrigerant, prepped with topical antiseptic, and injected with a mixture of 1mL 40mg/mL Kenalog, 2mL 1% lidocaine and 2mL 0.5% marcaine through the inferolateral portal.  A band-aid was applied. The patient tolerated the procedure well.     Orestes Gaona PA-C  [address] Orthopedic Surgery

## 2023-02-24 RX ORDER — HYDROCHLOROTHIAZIDE 25 MG/1
TABLET ORAL
Qty: 90 TABLET | Refills: 0 | Status: SHIPPED | OUTPATIENT
Start: 2023-02-24

## 2023-03-02 ENCOUNTER — TELEMEDICINE (OUTPATIENT)
Dept: FAMILY MEDICINE CLINIC | Facility: CLINIC | Age: 58
End: 2023-03-02
Payer: MEDICAID

## 2023-03-02 DIAGNOSIS — H90.3 SENSORY HEARING LOSS, BILATERAL: Primary | ICD-10-CM

## 2023-03-02 PROCEDURE — 99213 OFFICE O/P EST LOW 20 MIN: CPT | Performed by: FAMILY MEDICINE

## 2023-03-23 ENCOUNTER — HOSPITAL ENCOUNTER (EMERGENCY)
Age: 58
Discharge: HOME | End: 2023-03-23
Attending: EMERGENCY MEDICINE
Payer: MEDICAID

## 2023-03-23 VITALS
WEIGHT: 235 LBS | HEART RATE: 70 BPM | SYSTOLIC BLOOD PRESSURE: 123 MMHG | TEMPERATURE: 98 F | BODY MASS INDEX: 30.16 KG/M2 | RESPIRATION RATE: 16 BRPM | OXYGEN SATURATION: 98 % | HEIGHT: 74 IN | DIASTOLIC BLOOD PRESSURE: 62 MMHG

## 2023-03-23 DIAGNOSIS — J10.1 INFLUENZA A: Primary | ICD-10-CM

## 2023-03-23 LAB
POCT INFLUENZA A: POSITIVE
POCT INFLUENZA B: NEGATIVE
SARS-COV-2 RNA RESP QL NAA+PROBE: NOT DETECTED

## 2023-03-23 PROCEDURE — 99284 EMERGENCY DEPT VISIT MOD MDM: CPT

## 2023-03-23 PROCEDURE — 87502 INFLUENZA DNA AMP PROBE: CPT | Performed by: EMERGENCY MEDICINE

## 2023-03-23 PROCEDURE — 99283 EMERGENCY DEPT VISIT LOW MDM: CPT

## 2023-03-23 RX ORDER — BALOXAVIR MARBOXIL 80 MG/1
1 TABLET, FILM COATED ORAL ONCE
Qty: 1 EACH | Refills: 0 | Status: SHIPPED | OUTPATIENT
Start: 2023-03-23 | End: 2023-03-23

## 2023-03-23 NOTE — ED INITIAL ASSESSMENT (HPI)
Pt c/o cough and chills x2 days. Pt also states he was feeling dizziness when he stood up, dizziness symptoms resolved this morning.

## 2023-03-24 ENCOUNTER — TELEMEDICINE (OUTPATIENT)
Dept: FAMILY MEDICINE CLINIC | Facility: CLINIC | Age: 58
End: 2023-03-24
Payer: MEDICAID

## 2023-03-24 DIAGNOSIS — J10.1 INFLUENZA A: Primary | ICD-10-CM

## 2023-03-24 PROCEDURE — 99213 OFFICE O/P EST LOW 20 MIN: CPT | Performed by: FAMILY MEDICINE

## 2023-03-24 RX ORDER — OSELTAMIVIR PHOSPHATE 75 MG/1
75 CAPSULE ORAL 2 TIMES DAILY
Qty: 10 CAPSULE | Refills: 0 | Status: SHIPPED | OUTPATIENT
Start: 2023-03-24 | End: 2023-03-29

## 2023-03-28 ENCOUNTER — LAB ENCOUNTER (OUTPATIENT)
Dept: LAB | Age: 58
End: 2023-03-28
Attending: FAMILY MEDICINE
Payer: MEDICAID

## 2023-03-28 ENCOUNTER — OFFICE VISIT (OUTPATIENT)
Dept: FAMILY MEDICINE CLINIC | Facility: CLINIC | Age: 58
End: 2023-03-28
Payer: MEDICAID

## 2023-03-28 VITALS
RESPIRATION RATE: 16 BRPM | TEMPERATURE: 98 F | OXYGEN SATURATION: 99 % | HEART RATE: 74 BPM | BODY MASS INDEX: 29.9 KG/M2 | WEIGHT: 233 LBS | DIASTOLIC BLOOD PRESSURE: 80 MMHG | HEIGHT: 74 IN | SYSTOLIC BLOOD PRESSURE: 126 MMHG

## 2023-03-28 DIAGNOSIS — J10.1 INFLUENZA A: Primary | ICD-10-CM

## 2023-03-28 DIAGNOSIS — E86.0 DEHYDRATION: ICD-10-CM

## 2023-03-28 LAB
ALBUMIN SERPL-MCNC: 3.7 G/DL (ref 3.4–5)
ALBUMIN/GLOB SERPL: 0.9 {RATIO} (ref 1–2)
ALP LIVER SERPL-CCNC: 83 U/L
ALT SERPL-CCNC: 49 U/L
ANION GAP SERPL CALC-SCNC: 2 MMOL/L (ref 0–18)
AST SERPL-CCNC: 34 U/L (ref 15–37)
BASOPHILS # BLD AUTO: 0.04 X10(3) UL (ref 0–0.2)
BASOPHILS NFR BLD AUTO: 0.7 %
BILIRUB SERPL-MCNC: 0.4 MG/DL (ref 0.1–2)
BUN BLD-MCNC: 13 MG/DL (ref 7–18)
CALCIUM BLD-MCNC: 9.5 MG/DL (ref 8.5–10.1)
CHLORIDE SERPL-SCNC: 104 MMOL/L (ref 98–112)
CO2 SERPL-SCNC: 31 MMOL/L (ref 21–32)
CREAT BLD-MCNC: 1.41 MG/DL
EOSINOPHIL # BLD AUTO: 0.06 X10(3) UL (ref 0–0.7)
EOSINOPHIL NFR BLD AUTO: 1 %
ERYTHROCYTE [DISTWIDTH] IN BLOOD BY AUTOMATED COUNT: 12.8 %
FASTING STATUS PATIENT QL REPORTED: YES
GFR SERPLBLD BASED ON 1.73 SQ M-ARVRAT: 58 ML/MIN/1.73M2 (ref 60–?)
GLOBULIN PLAS-MCNC: 3.9 G/DL (ref 2.8–4.4)
GLUCOSE BLD-MCNC: 128 MG/DL (ref 70–99)
HCT VFR BLD AUTO: 51.1 %
HGB BLD-MCNC: 17.4 G/DL
IMM GRANULOCYTES # BLD AUTO: 0.01 X10(3) UL (ref 0–1)
IMM GRANULOCYTES NFR BLD: 0.2 %
LYMPHOCYTES # BLD AUTO: 2.67 X10(3) UL (ref 1–4)
LYMPHOCYTES NFR BLD AUTO: 44.6 %
MCH RBC QN AUTO: 29.4 PG (ref 26–34)
MCHC RBC AUTO-ENTMCNC: 34.1 G/DL (ref 31–37)
MCV RBC AUTO: 86.3 FL
MONOCYTES # BLD AUTO: 0.45 X10(3) UL (ref 0.1–1)
MONOCYTES NFR BLD AUTO: 7.5 %
NEUTROPHILS # BLD AUTO: 2.75 X10 (3) UL (ref 1.5–7.7)
NEUTROPHILS # BLD AUTO: 2.75 X10(3) UL (ref 1.5–7.7)
NEUTROPHILS NFR BLD AUTO: 46 %
OSMOLALITY SERPL CALC.SUM OF ELEC: 286 MOSM/KG (ref 275–295)
PLATELET # BLD AUTO: 330 10(3)UL (ref 150–450)
POTASSIUM SERPL-SCNC: 3.7 MMOL/L (ref 3.5–5.1)
PROT SERPL-MCNC: 7.6 G/DL (ref 6.4–8.2)
RBC # BLD AUTO: 5.92 X10(6)UL
SODIUM SERPL-SCNC: 137 MMOL/L (ref 136–145)
WBC # BLD AUTO: 6 X10(3) UL (ref 4–11)

## 2023-03-28 PROCEDURE — 99214 OFFICE O/P EST MOD 30 MIN: CPT | Performed by: FAMILY MEDICINE

## 2023-03-28 PROCEDURE — 3079F DIAST BP 80-89 MM HG: CPT | Performed by: FAMILY MEDICINE

## 2023-03-28 PROCEDURE — 3008F BODY MASS INDEX DOCD: CPT | Performed by: FAMILY MEDICINE

## 2023-03-28 PROCEDURE — 3074F SYST BP LT 130 MM HG: CPT | Performed by: FAMILY MEDICINE

## 2023-03-28 PROCEDURE — 85025 COMPLETE CBC W/AUTO DIFF WBC: CPT

## 2023-03-28 PROCEDURE — 36415 COLL VENOUS BLD VENIPUNCTURE: CPT

## 2023-03-28 PROCEDURE — 80053 COMPREHEN METABOLIC PANEL: CPT

## 2023-03-28 NOTE — PATIENT INSTRUCTIONS
Thank you for choosing Shira Quick MD at [place] [address]  To Do: Jazmin Schroeder  1. Please see info  [facility] is located in [address]. Monday, Tuesday & Friday - 8 a.m. to 4 p.m. Wednesday, Thursday - 7 a.m. to 3 p.m. The lab is closed daily from 12 p.m.-12:30 p.m. Saturday lab hours by appointment. Call [phone] to schedule the appointment. Please signup for Studio Systems, which is electronic access to your record if you have not done so. All your results will post on there. [email or web address]   You can NOW use Studio Systems to book your appointments with us, or consider using open access scheduling which is through the edward website [email or web address] and type in Shira Quick MD and follow the links for \"Schedule Online Now\"    To schedule Imaging or tests at [facility] Scheduling [phone], Go to [facility] A ER Building (For example: CT scans, X rays, Ultrasound, MRI)  Cardiac Testing in ER building Building A second floor Cardiac Testing [phone] (For example: Holter Monitor, Cardiac Stress tests,Event Monitor, or 2D Echocardiograms)  [facility] call [phone] usually in Critical access hospital A  Walk in Clinic in [place] at [facility]. [address] Mon-Fri at 8am-7:30 p.m., and Sat/Sun 9:00a. m.-4:30 p.m. Also at [address]  Call [phone] for info     Please call our office about any questions regarding your treatment/medicines/tests as a result of today's visit. For your safety, read the entire package insert of all medicines prescribed to you and be aware of all of the risks of treatment even beyond those discussed today. All therapies have potential risk of harm or side effects or medication interactions.   It is your duty and for your safety to discuss with the pharmacist and our office with questions, and to notify us and stop treatment if problems arise, but know that our intention is that the benefits outweigh those potential risks and we strive to make you healthier and to improve your quality of life. Referrals, and Radiology Information:    If your insurance requires a referral to a specialist, please allow 5 business days to process your referral request.    If Arvind Crockett MD orders a CT or MRI, it may take up to 10 business days to receive approval from your insurance company. Once our office has called informing you that the insurance company approved your testing, please call Central Scheduling at [phone]  Please allow our office 5 business days to contact you regarding any testing results. Refill policies:   Allow 3 business days for refills; controlled substances may take longer and must be picked up from the office in person. Narcotic medications can only be filled in 30 day increments and must be refilled at an office visit only. If your prescription is due for a refill, you may be due for a follow-up appointment. We cannot refill your maintenance medications at a preventative wellness visit. To best provide you care, patients receiving maintenance medications need to be seen at least twice a year.

## 2023-03-29 ENCOUNTER — PATIENT MESSAGE (OUTPATIENT)
Dept: FAMILY MEDICINE CLINIC | Facility: CLINIC | Age: 58
End: 2023-03-29

## 2023-03-29 NOTE — TELEPHONE ENCOUNTER
Recommend taking Tylenol every 4-6 hours. Keep hydrated as previously discussed. If it still persists, recommend going to the emergency room.

## 2023-03-29 NOTE — TELEPHONE ENCOUNTER
From: Davie Solis  To: Rylie Mae MD  Sent: 3/29/2023 2:10 PM CDT  Subject: Shivering     Hi  Why my whole body is shivering feel cold from inside what should I do any advice?

## 2023-04-12 ENCOUNTER — TELEPHONE (OUTPATIENT)
Dept: FAMILY MEDICINE CLINIC | Facility: CLINIC | Age: 58
End: 2023-04-12

## 2023-04-12 ENCOUNTER — OFFICE VISIT (OUTPATIENT)
Dept: FAMILY MEDICINE CLINIC | Facility: CLINIC | Age: 58
End: 2023-04-12
Payer: MEDICAID

## 2023-04-12 VITALS
RESPIRATION RATE: 16 BRPM | OXYGEN SATURATION: 99 % | WEIGHT: 234 LBS | HEIGHT: 74 IN | SYSTOLIC BLOOD PRESSURE: 122 MMHG | TEMPERATURE: 98 F | HEART RATE: 77 BPM | DIASTOLIC BLOOD PRESSURE: 76 MMHG | BODY MASS INDEX: 30.03 KG/M2

## 2023-04-12 DIAGNOSIS — E86.0 DEHYDRATION: Primary | ICD-10-CM

## 2023-04-12 DIAGNOSIS — I10 ESSENTIAL HYPERTENSION: ICD-10-CM

## 2023-04-12 PROCEDURE — 3078F DIAST BP <80 MM HG: CPT | Performed by: FAMILY MEDICINE

## 2023-04-12 PROCEDURE — 99214 OFFICE O/P EST MOD 30 MIN: CPT | Performed by: FAMILY MEDICINE

## 2023-04-12 PROCEDURE — 3074F SYST BP LT 130 MM HG: CPT | Performed by: FAMILY MEDICINE

## 2023-04-12 PROCEDURE — 3008F BODY MASS INDEX DOCD: CPT | Performed by: FAMILY MEDICINE

## 2023-04-12 RX ORDER — TADALAFIL 5 MG/1
5 TABLET ORAL
Qty: 20 TABLET | Refills: 0 | Status: SHIPPED | OUTPATIENT
Start: 2023-04-12

## 2023-04-12 NOTE — TELEPHONE ENCOUNTER
Faxed signed [employer] Requisition to [employer] at [phone] along with face sheet and patients insurance. Requisition sent to scan.

## 2023-04-12 NOTE — PATIENT INSTRUCTIONS
Thank you for choosing Black Ramirez MD at [place] [address]  To Do: Guillermo Delaney  1. Please see below  [facility] is located in [address]. Monday, Tuesday & Friday - 8 a.m. to 4 p.m. Wednesday, Thursday - 7 a.m. to 3 p.m. The lab is closed daily from 12 p.m.-12:30 p.m. Saturday lab hours by appointment. Call [phone] to schedule the appointment. Please signup for SignStorey, which is electronic access to your record if you have not done so. All your results will post on there. [email or web address]   You can NOW use SignStorey to book your appointments with us, or consider using open access scheduling which is through the edward website [email or web address] and type in Black Ramirez MD and follow the links for \"Schedule Online Now\"    To schedule Imaging or tests at [facility] Scheduling [phone], Go to [facility] A ER Building (For example: CT scans, X rays, Ultrasound, MRI)  Cardiac Testing in ER building Building A second floor Cardiac Testing [phone] (For example: Holter Monitor, Cardiac Stress tests,Event Monitor, or 2D Echocardiograms)  [facility] call [phone] usually in dg A  Walk in Clinic in [place] at [facility]. [address] Mon-Fri at 8am-7:30 p.m., and Sat/Sun 9:00a. m.-4:30 p.m. Also at [address] [facility]  Call [phone] for info     Please call our office about any questions regarding your treatment/medicines/tests as a result of today's visit. For your safety, read the entire package insert of all medicines prescribed to you and be aware of all of the risks of treatment even beyond those discussed today. All therapies have potential risk of harm or side effects or medication interactions.   It is your duty and for your safety to discuss with the pharmacist and our office with questions, and to notify us and stop treatment if problems arise, but know that our intention is that the benefits outweigh those potential risks and we strive to make you healthier and to improve your quality of life. Referrals, and Radiology Information:    If your insurance requires a referral to a specialist, please allow 5 business days to process your referral request.    If Shira Quick MD orders a CT or MRI, it may take up to 10 business days to receive approval from your insurance company. Once our office has called informing you that the insurance company approved your testing, please call Central Scheduling at [phone]  Please allow our office 5 business days to contact you regarding any testing results. Refill policies:   Allow 3 business days for refills; controlled substances may take longer and must be picked up from the office in person. Narcotic medications can only be filled in 30 day increments and must be refilled at an office visit only. If your prescription is due for a refill, you may be due for a follow-up appointment. We cannot refill your maintenance medications at a preventative wellness visit. To best provide you care, patients receiving maintenance medications need to be seen at least twice a year.

## 2023-05-21 RX ORDER — TADALAFIL 5 MG/1
5 TABLET ORAL
Qty: 20 TABLET | Refills: 0 | Status: SHIPPED | OUTPATIENT
Start: 2023-05-21

## 2023-05-23 ENCOUNTER — HOSPITAL ENCOUNTER (OUTPATIENT)
Dept: GENERAL RADIOLOGY | Age: 58
Discharge: HOME | End: 2023-05-23
Attending: PHYSICIAN ASSISTANT
Payer: MEDICAID

## 2023-05-23 DIAGNOSIS — S83.411A SPRAIN OF MEDIAL COLLATERAL LIGAMENT OF RIGHT KNEE, INITIAL ENCOUNTER: ICD-10-CM

## 2023-05-23 PROCEDURE — 73564 X-RAY EXAM KNEE 4 OR MORE: CPT | Performed by: PHYSICIAN ASSISTANT

## 2023-05-24 ENCOUNTER — TELEPHONE (OUTPATIENT)
Dept: FAMILY MEDICINE CLINIC | Facility: CLINIC | Age: 58
End: 2023-05-24

## 2023-05-24 ENCOUNTER — OFFICE VISIT (OUTPATIENT)
Dept: FAMILY MEDICINE CLINIC | Facility: CLINIC | Age: 58
End: 2023-05-24
Payer: MEDICAID

## 2023-05-24 VITALS
HEART RATE: 86 BPM | WEIGHT: 237 LBS | RESPIRATION RATE: 16 BRPM | TEMPERATURE: 98 F | HEIGHT: 74 IN | OXYGEN SATURATION: 99 % | BODY MASS INDEX: 30.42 KG/M2 | SYSTOLIC BLOOD PRESSURE: 122 MMHG | DIASTOLIC BLOOD PRESSURE: 76 MMHG

## 2023-05-24 DIAGNOSIS — F52.4 PREMATURE EJACULATION: ICD-10-CM

## 2023-05-24 DIAGNOSIS — G89.29 CHRONIC PAIN OF RIGHT KNEE: Primary | ICD-10-CM

## 2023-05-24 DIAGNOSIS — M25.561 CHRONIC PAIN OF RIGHT KNEE: Primary | ICD-10-CM

## 2023-05-24 PROCEDURE — 3078F DIAST BP <80 MM HG: CPT | Performed by: FAMILY MEDICINE

## 2023-05-24 PROCEDURE — 99214 OFFICE O/P EST MOD 30 MIN: CPT | Performed by: FAMILY MEDICINE

## 2023-05-24 PROCEDURE — 3074F SYST BP LT 130 MM HG: CPT | Performed by: FAMILY MEDICINE

## 2023-05-24 PROCEDURE — 3008F BODY MASS INDEX DOCD: CPT | Performed by: FAMILY MEDICINE

## 2023-05-24 RX ORDER — TADALAFIL 5 MG/1
5 TABLET ORAL
Qty: 30 TABLET | Refills: 5 | Status: SHIPPED | OUTPATIENT
Start: 2023-05-24

## 2023-05-24 NOTE — PATIENT INSTRUCTIONS
Thank you for choosing Lucrecia Cruz MD at [place] [address]  To Do: Jinny Garcia  1. Please take meds as directed. [facility] is located in [address]. Monday, Tuesday & Friday - 8 a.m. to 4 p.m. Wednesday, Thursday - 7 a.m. to 3 p.m. The lab is closed daily from 12 p.m.-12:30 p.m. Saturday lab hours by appointment. Call [phone] to schedule the appointment. Please signup for Are You a Human, which is electronic access to your record if you have not done so. All your results will post on there. [email or web address]   You can NOW use Are You a Human to book your appointments with us, or consider using open access scheduling which is through the edward website [email or web address] and type in Lucrecia Cruz MD and follow the links for \"Schedule Online Now\"    To schedule Imaging or tests at [facility] Scheduling [phone], Go to [facility] A ER Building (For example: CT scans, X rays, Ultrasound, MRI)  Cardiac Testing in ER building Building A second floor Cardiac Testing [phone] (For example: Holter Monitor, Cardiac Stress tests,Event Monitor, or 2D Echocardiograms)  [facility] call [phone] usually in [facility] A  Walk in Clinic in [place] at [facility]. [address] Mon-Fri at 8am-7:30 p.m., and Sat/Sun 9:00a. m.-4:30 p.m. Also at [address]  Call [phone] for info     Please call our office about any questions regarding your treatment/medicines/tests as a result of today's visit. For your safety, read the entire package insert of all medicines prescribed to you and be aware of all of the risks of treatment even beyond those discussed today. All therapies have potential risk of harm or side effects or medication interactions.   It is your duty and for your safety to discuss with the pharmacist and our office with questions, and to notify us and stop treatment if problems arise, but know that our intention is that the benefits outweigh those potential risks and we strive to make you healthier and to improve your quality of life. Referrals, and Radiology Information:    If your insurance requires a referral to a specialist, please allow 5 business days to process your referral request.    If Dipti Pace MD orders a CT or MRI, it may take up to 10 business days to receive approval from your insurance company. Once our office has called informing you that the insurance company approved your testing, please call Central Scheduling at [phone]  Please allow our office 5 business days to contact you regarding any testing results. Refill policies:   Allow 3 business days for refills; controlled substances may take longer and must be picked up from the office in person. Narcotic medications can only be filled in 30 day increments and must be refilled at an office visit only. If your prescription is due for a refill, you may be due for a follow-up appointment. We cannot refill your maintenance medications at a preventative wellness visit. To best provide you care, patients receiving maintenance medications need to be seen at least twice a year.

## 2023-06-07 ENCOUNTER — OFFICE VISIT (OUTPATIENT)
Dept: ORTHOPEDICS CLINIC | Facility: CLINIC | Age: 58
End: 2023-06-07
Payer: MEDICAID

## 2023-06-07 VITALS — HEIGHT: 74 IN | BODY MASS INDEX: 30.42 KG/M2 | WEIGHT: 237 LBS

## 2023-06-07 DIAGNOSIS — M17.11 PRIMARY OSTEOARTHRITIS OF RIGHT KNEE: Primary | ICD-10-CM

## 2023-06-07 PROCEDURE — 20610 DRAIN/INJ JOINT/BURSA W/O US: CPT | Performed by: PHYSICIAN ASSISTANT

## 2023-06-07 PROCEDURE — 3008F BODY MASS INDEX DOCD: CPT | Performed by: PHYSICIAN ASSISTANT

## 2023-06-07 RX ORDER — TRIAMCINOLONE ACETONIDE 40 MG/ML
40 INJECTION, SUSPENSION INTRA-ARTICULAR; INTRAMUSCULAR ONCE
Status: COMPLETED | OUTPATIENT
Start: 2023-06-07 | End: 2023-06-07

## 2023-06-07 RX ADMIN — TRIAMCINOLONE ACETONIDE 40 MG: 40 INJECTION, SUSPENSION INTRA-ARTICULAR; INTRAMUSCULAR at 16:16:00

## 2023-06-07 NOTE — PROCEDURES
Patient will be traveling out of the country in August and will not return until October. Did discuss that as long as the injections continue to offer him 4 months of relief that it should continue to provide him relief through his trip. If no significant improvement in the future the may consider Zilretta injection. After informed consent, the patient's right knee was marked, locally anesthetized with skin refrigerant, prepped with topical antiseptic, and injected with a mixture of 1mL 40mg/mL Kenalog, 2mL 1% lidocaine and 2mL 0.5% marcaine through the inferolateral portal.  A band-aid was applied. The patient tolerated the procedure well.     Chavo Ernst PA-C  [address] Orthopedic Surgery

## 2023-06-27 ENCOUNTER — TELEPHONE (OUTPATIENT)
Dept: PHYSICAL THERAPY | Facility: HOSPITAL | Age: 58
End: 2023-06-27

## 2023-06-27 ENCOUNTER — APPOINTMENT (OUTPATIENT)
Dept: PHYSICAL THERAPY | Age: 58
End: 2023-06-27
Attending: FAMILY MEDICINE
Payer: MEDICAID

## 2023-06-28 ENCOUNTER — OFFICE VISIT (OUTPATIENT)
Dept: PHYSICAL THERAPY | Age: 58
End: 2023-06-28
Attending: FAMILY MEDICINE
Payer: MEDICAID

## 2023-06-28 DIAGNOSIS — G89.29 CHRONIC PAIN OF RIGHT KNEE: Primary | ICD-10-CM

## 2023-06-28 DIAGNOSIS — M25.561 CHRONIC PAIN OF RIGHT KNEE: Primary | ICD-10-CM

## 2023-06-28 PROCEDURE — 97110 THERAPEUTIC EXERCISES: CPT

## 2023-06-28 PROCEDURE — 97161 PT EVAL LOW COMPLEX 20 MIN: CPT

## 2023-07-05 ENCOUNTER — HOSPITAL ENCOUNTER (OUTPATIENT)
Dept: CT IMAGING | Age: 58
Discharge: HOME | End: 2023-07-05
Attending: UROLOGY
Payer: MEDICAID

## 2023-07-05 ENCOUNTER — PATIENT MESSAGE (OUTPATIENT)
Dept: SURGERY | Facility: CLINIC | Age: 58
End: 2023-07-05

## 2023-07-05 DIAGNOSIS — C64.1 CLEAR CELL CARCINOMA OF RIGHT KIDNEY (HCC): ICD-10-CM

## 2023-07-05 DIAGNOSIS — C64.9 RENAL CELL CARCINOMA, UNSPECIFIED LATERALITY (HCC): ICD-10-CM

## 2023-07-05 PROCEDURE — 82565 ASSAY OF CREATININE: CPT

## 2023-07-05 PROCEDURE — 71260 CT THORAX DX C+: CPT | Performed by: PHYSICIAN ASSISTANT

## 2023-07-05 PROCEDURE — 71260 CT THORAX DX C+: CPT | Performed by: UROLOGY

## 2023-07-05 PROCEDURE — 74170 CT ABD WO CNTRST FLWD CNTRST: CPT | Performed by: UROLOGY

## 2023-07-05 PROCEDURE — 74170 CT ABD WO CNTRST FLWD CNTRST: CPT | Performed by: PHYSICIAN ASSISTANT

## 2023-07-06 ENCOUNTER — OFFICE VISIT (OUTPATIENT)
Dept: PHYSICAL THERAPY | Age: 58
End: 2023-07-06
Attending: FAMILY MEDICINE
Payer: MEDICAID

## 2023-07-06 LAB
CREAT BLD-MCNC: 1.3 MG/DL
GFR SERPLBLD BASED ON 1.73 SQ M-ARVRAT: 64 ML/MIN/1.73M2 (ref 60–?)

## 2023-07-06 PROCEDURE — 97110 THERAPEUTIC EXERCISES: CPT

## 2023-07-11 ENCOUNTER — APPOINTMENT (OUTPATIENT)
Dept: PHYSICAL THERAPY | Age: 58
End: 2023-07-11
Attending: FAMILY MEDICINE
Payer: MEDICAID

## 2023-07-11 ENCOUNTER — TELEPHONE (OUTPATIENT)
Dept: PHYSICAL THERAPY | Facility: HOSPITAL | Age: 58
End: 2023-07-11

## 2023-07-13 ENCOUNTER — OFFICE VISIT (OUTPATIENT)
Dept: PHYSICAL THERAPY | Age: 58
End: 2023-07-13
Attending: FAMILY MEDICINE
Payer: MEDICAID

## 2023-07-13 PROCEDURE — 97110 THERAPEUTIC EXERCISES: CPT

## 2023-07-14 ENCOUNTER — OFFICE VISIT (OUTPATIENT)
Dept: SURGERY | Facility: CLINIC | Age: 58
End: 2023-07-14

## 2023-07-14 DIAGNOSIS — N13.8 BPH WITH OBSTRUCTION/LOWER URINARY TRACT SYMPTOMS: ICD-10-CM

## 2023-07-14 DIAGNOSIS — N40.1 BPH WITH OBSTRUCTION/LOWER URINARY TRACT SYMPTOMS: ICD-10-CM

## 2023-07-14 DIAGNOSIS — C64.9 RENAL CELL CARCINOMA, UNSPECIFIED LATERALITY (HCC): Primary | ICD-10-CM

## 2023-07-14 DIAGNOSIS — N52.9 ERECTILE DYSFUNCTION, UNSPECIFIED ERECTILE DYSFUNCTION TYPE: ICD-10-CM

## 2023-07-14 LAB
APPEARANCE: CLEAR
BILIRUBIN: NEGATIVE
GLUCOSE (URINE DIPSTICK): NEGATIVE MG/DL
KETONES (URINE DIPSTICK): NEGATIVE MG/DL
LEUKOCYTES: NEGATIVE
MULTISTIX LOT#: ABNORMAL NUMERIC
NITRITE, URINE: NEGATIVE
PH, URINE: 7 (ref 4.5–8)
PROTEIN (URINE DIPSTICK): NEGATIVE MG/DL
SPECIFIC GRAVITY: 1.01 (ref 1–1.03)
URINE-COLOR: YELLOW
UROBILINOGEN,SEMI-QN: 0.2 MG/DL (ref 0–1.9)

## 2023-07-14 PROCEDURE — 99214 OFFICE O/P EST MOD 30 MIN: CPT | Performed by: UROLOGY

## 2023-07-14 PROCEDURE — 81003 URINALYSIS AUTO W/O SCOPE: CPT | Performed by: UROLOGY

## 2023-07-14 RX ORDER — TADALAFIL 20 MG/1
20 TABLET ORAL
Qty: 30 TABLET | Refills: 5 | Status: SHIPPED | OUTPATIENT
Start: 2023-07-14

## 2023-07-14 NOTE — PROGRESS NOTES
HPI:     Mal Vasquez is a 61year old male with a PMH of anxiety/depression, HTN, OA, CKD 3. He presents as a consult with:  1. H/o Right RCC  - s/p nx at [facility] 11/2020  - path: Grade 2 clear cell RCC, negative margins, stage T1b  2. Recurrent kidney stones  - passed two, one in 2020 and renal mass was detected at that time  3. Fam h/o CaP  - dad dx in 76s  4. BPH w/o LUTS  5. ED  - 5 mg cialis    PCP - Leyda  Nephro - Constanza  LOV Visit date not found    He currently feels well. Appetite and energy are good. AUA SS is zero. Happy with LUTS. Incontinence: none  ROSEMARIE prior visit:  > 30-40 g, no nodules or tenderness    Cr ~ 1.3-1.4 baseline    Reports ~ 30% potency. Viagra caused dizziness and not sure if it helped. Discussed cialis and he would like to try. PSA 1.59 5/5/22    Drinks ~ 60 oz water, 20 oz tea with medium yellow urine. Gross hematuria: none  Tobacco hx: none  Fam h/o CaP, no other  cancers. CT CA 7/5/23: stable  CT CA Aug 2022: 3 mm triangular perifissural nodule in RML of lung, likely benign. FLD  CT pelvis 5/5/22: right lateral abd wall hernia containing fat, small umbilical hernia    We reviewed the NCCN guidelines for f/u for T1b RCC s/p radical nephrectomy. - annual imaging with C/A up to 3 y with chest imaging up to 5 y    Will try 20 mg cialis ([facility]), observation for BPH, continue annual PSA checks for fam h/o CaP. F/u in 1 y with CT C/A prior.     HISTORY:  Past Medical History:   Diagnosis Date    Carcinoid tumor of kidney     Essential hypertension       Past Surgical History:   Procedure Laterality Date    NEPHRECTOMY Right 11/2020      Family History   Problem Relation Age of Onset    Cancer Father         prostate    Heart Disease Father     Diabetes Mother     Arthritis Mother       Social History:   Social History     Socioeconomic History    Marital status:    Tobacco Use    Smoking status: Never    Smokeless tobacco: Never   Vaping Use    Vaping Use: Never used   Substance and Sexual Activity    Alcohol use: Never    Drug use: Never   Other Topics Concern    Caffeine Concern Yes     Comment: tea daily    Exercise No        Medications (Active prior to today's visit):  Current Outpatient Medications   Medication Sig Dispense Refill    Tadalafil (CIALIS) 20 MG Oral Tab Take 1 tablet (20 mg total) by mouth daily as needed for Erectile Dysfunction. 30 tablet 5    diclofenac 1 % External Gel Apply 2 g topically 4 (four) times daily. 240 g 2    HYDROCHLOROTHIAZIDE 25 MG Oral Tab TAKE 1 TABLET(25 MG) BY MOUTH DAILY 90 tablet 0    PARoxetine 20 MG Oral Tab Take 1 tablet (20 mg total) by mouth every morning. 90 tablet 1    clobetasol 0.05 % External Cream Apply 1 Application topically 2 (two) times daily. 60 g 1    aspirin 325 MG Oral Tab Take 1 tablet (325 mg total) by mouth daily. Multiple Vitamin (ONE-DAILY MULTI VITAMINS) Oral Tab Take 1 tablet by mouth daily. Allergies:  No Known Allergies      ROS:     A comprehensive 10 point review of systems was completed. Pertinent positives and negatives noted in the the HPI. PHYSICAL EXAM:     GENERAL APPEARANCE: well, developed, well nourished, in no acute distress  NEUROLOGIC: nonfocal, alert and oriented  HEAD: normocephalic, atraumatic  EYES: sclera non-icteric  EARS: hearing intact  ORAL CAVITY: mucosa moist  NECK/THYROID: no obvious goiter or masses  LUNGS: nonlabored breathing  ABDOMEN: soft, no obvious masses or tenderness  SKIN: no obvious rashes    : as noted above     ASSESSMENT/PLAN:   Diagnoses and all orders for this visit:    Renal cell carcinoma, unspecified laterality (HCC)  -     URINALYSIS, AUTO, W/O SCOPE  -     CT CHEST (CONTRAST ONLY) ABDOMEN (W+WO) (CPT=71260/70162); Future    Erectile dysfunction, unspecified erectile dysfunction type  -     Tadalafil (CIALIS) 20 MG Oral Tab; Take 1 tablet (20 mg total) by mouth daily as needed for Erectile Dysfunction.     BPH with obstruction/lower urinary tract symptoms        - as noted above. Thanks again for this consult.     Ana Paula Pressley MD, [facility]  Urologist  [facility]  Office: [phone]

## 2023-07-18 ENCOUNTER — APPOINTMENT (OUTPATIENT)
Dept: PHYSICAL THERAPY | Age: 58
End: 2023-07-18
Attending: FAMILY MEDICINE
Payer: MEDICAID

## 2023-07-20 ENCOUNTER — OFFICE VISIT (OUTPATIENT)
Dept: FAMILY MEDICINE CLINIC | Facility: CLINIC | Age: 58
End: 2023-07-20
Payer: MEDICAID

## 2023-07-20 ENCOUNTER — APPOINTMENT (OUTPATIENT)
Dept: PHYSICAL THERAPY | Age: 58
End: 2023-07-20
Attending: FAMILY MEDICINE
Payer: MEDICAID

## 2023-07-20 VITALS
HEIGHT: 74 IN | DIASTOLIC BLOOD PRESSURE: 74 MMHG | OXYGEN SATURATION: 98 % | SYSTOLIC BLOOD PRESSURE: 138 MMHG | BODY MASS INDEX: 29.77 KG/M2 | TEMPERATURE: 98 F | WEIGHT: 232 LBS | RESPIRATION RATE: 16 BRPM | HEART RATE: 80 BPM

## 2023-07-20 DIAGNOSIS — K76.0 FATTY LIVER: ICD-10-CM

## 2023-07-20 DIAGNOSIS — Z12.5 SCREENING FOR MALIGNANT NEOPLASM OF PROSTATE: ICD-10-CM

## 2023-07-20 DIAGNOSIS — I10 ESSENTIAL HYPERTENSION: ICD-10-CM

## 2023-07-20 DIAGNOSIS — D17.1 LIPOMA OF BACK: Primary | ICD-10-CM

## 2023-07-20 PROCEDURE — 3075F SYST BP GE 130 - 139MM HG: CPT | Performed by: FAMILY MEDICINE

## 2023-07-20 PROCEDURE — 3008F BODY MASS INDEX DOCD: CPT | Performed by: FAMILY MEDICINE

## 2023-07-20 PROCEDURE — 99214 OFFICE O/P EST MOD 30 MIN: CPT | Performed by: FAMILY MEDICINE

## 2023-07-20 PROCEDURE — 3078F DIAST BP <80 MM HG: CPT | Performed by: FAMILY MEDICINE

## 2023-07-20 NOTE — PATIENT INSTRUCTIONS
Thank you for choosing Ricky Bryant MD at [place] [address]  To Do: Lisa Major  1. Please see below  [facility] is located in [address]. Monday, Tuesday & Friday - 8 a.m. to 4 p.m. Wednesday, Thursday - 7 a.m. to 3 p.m. The lab is closed daily from 12 p.m.-12:30 p.m. Saturday lab hours by appointment. Call [phone] to schedule the appointment. Please signup for Isothermal Systems Research, which is electronic access to your record if you have not done so. All your results will post on there. [email or web address]   You can NOW use Isothermal Systems Research to book your appointments with us, or consider using open access scheduling which is through the edward website [email or web address] and type in Ricky Bryant MD and follow the links for \"Schedule Online Now\"    To schedule Imaging or tests at [facility] Scheduling [phone], Go to [facility] A ER Building (For example: CT scans, X rays, Ultrasound, MRI)  Cardiac Testing in ER building Building A second floor Cardiac Testing [phone] (For example: Holter Monitor, Cardiac Stress tests,Event Monitor, or 2D Echocardiograms)  [facility] call [phone] usually in [facility] A  Walk in Clinic in [facility] at [facility]. [address] Mon-Fri at 8am-7:30 p.m., and Sat/Sun 9:00a. m.-4:30 p.m. Also at [address] [facility]  Call [phone] for info     Please call our office about any questions regarding your treatment/medicines/tests as a result of today's visit. For your safety, read the entire package insert of all medicines prescribed to you and be aware of all of the risks of treatment even beyond those discussed today. All therapies have potential risk of harm or side effects or medication interactions.   It is your duty and for your safety to discuss with the pharmacist and our office with questions, and to notify us and stop treatment if problems arise, but know that our intention is that the benefits outweigh those potential risks and we strive to make you healthier and to improve your quality of life. Referrals, and Radiology Information:    If your insurance requires a referral to a specialist, please allow 5 business days to process your referral request.    If Lakshmi Barraza MD orders a CT or MRI, it may take up to 10 business days to receive approval from your insurance company. Once our office has called informing you that the insurance company approved your testing, please call Central Scheduling at [phone]  Please allow our office 5 business days to contact you regarding any testing results. Refill policies:   Allow 3 business days for refills; controlled substances may take longer and must be picked up from the office in person. Narcotic medications can only be filled in 30 day increments and must be refilled at an office visit only. If your prescription is due for a refill, you may be due for a follow-up appointment. We cannot refill your maintenance medications at a preventative wellness visit. To best provide you care, patients receiving maintenance medications need to be seen at least twice a year.

## 2023-07-20 NOTE — PROGRESS NOTES
Subjective:   Patient ID: Rosa Talley is a 61year old male. HPI  Mr. Katherine Ocasio is a very pleasant 50-year-old gentleman with history of right nephrectomy secondary to renal cell carcinoma of the right kidney, hypertension which is diet-controlled, anxiety, arthritis here today for his follow-up appointment. He has not been taking his hydrochlorothiazide as his blood pressure has been under good control but he did follow-up with urology previously and everything was fine and he has appointment with urology next year. He has a lump on his left thoracic area which is soft but does not hurt. He feels well overall. I had reviewed past medical and family histories together with allergy and medication lists documented. History/Other:   Review of Systems   Constitutional:  Negative for fatigue and fever. HENT:  Negative for sore throat and trouble swallowing. Respiratory:  Negative for cough and shortness of breath. Cardiovascular:  Negative for chest pain. Gastrointestinal:  Negative for abdominal pain, diarrhea, nausea and vomiting. Genitourinary:  Negative for difficulty urinating. Neurological:  Negative for dizziness, weakness, light-headedness and headaches. Current Outpatient Medications   Medication Sig Dispense Refill    Tadalafil (CIALIS) 20 MG Oral Tab Take 1 tablet (20 mg total) by mouth daily as needed for Erectile Dysfunction. 30 tablet 5    diclofenac 1 % External Gel Apply 2 g topically 4 (four) times daily. 240 g 2    HYDROCHLOROTHIAZIDE 25 MG Oral Tab TAKE 1 TABLET(25 MG) BY MOUTH DAILY 90 tablet 0    PARoxetine 20 MG Oral Tab Take 1 tablet (20 mg total) by mouth every morning. 90 tablet 1    clobetasol 0.05 % External Cream Apply 1 Application topically 2 (two) times daily. 60 g 1    aspirin 325 MG Oral Tab Take 1 tablet (325 mg total) by mouth daily. Multiple Vitamin (ONE-DAILY MULTI VITAMINS) Oral Tab Take 1 tablet by mouth daily.        Allergies:No Known Allergies    Objective:   Physical Exam  Vitals reviewed. Constitutional:       General: He is not in acute distress. HENT:      Mouth/Throat:      Mouth: Mucous membranes are moist.      Pharynx: Oropharynx is clear. Eyes:      General: No scleral icterus. Conjunctiva/sclera: Conjunctivae normal.   Cardiovascular:      Rate and Rhythm: Normal rate and regular rhythm. Heart sounds: Normal heart sounds. No murmur heard. Pulmonary:      Effort: Pulmonary effort is normal. No respiratory distress. Breath sounds: Normal breath sounds. No wheezing or rales. Abdominal:      General: Bowel sounds are normal. There is no distension. Palpations: Abdomen is soft. Tenderness: There is no abdominal tenderness. Musculoskeletal:      Cervical back: Neck supple. Right lower leg: No edema. Left lower leg: No edema. Lymphadenopathy:      Cervical: No cervical adenopathy. Skin:     General: Skin is warm. Comments: Soft palpable lesion measuring 2 inches in size noted on the left lateral chest wall region with no erythema no warmth no swelling. No discharge or fluctuance noted. Neurological:      General: No focal deficit present. Mental Status: He is alert. Psychiatric:         Mood and Affect: Mood normal.         Behavior: Behavior normal.         Assessment & Plan:   Lipoma of back  (primary encounter diagnosis)  -I assured him that this is benign in nature  - We will monitor at this point  Essential hypertension  -Diet controlled  - We will continue to monitor  - Labs ordered as below and will notify once we get test results  Screening for malignant neoplasm of prostate  Fatty liver  -Educated on what fatty liver is and what it risks it may have  - Encouraged weight loss    Follow-up in 3 months or as needed    This note was prepared using Synata voice recognition dictation software. As a result errors may occur.  When identified these errors have been corrected.  While every attempt is made to correct errors during dictation discrepancies may still exist          Orders Placed This Encounter      CBC W Differential W Platelet [E]      Comp Metabolic Panel (14) [E]      Lipid Panel [E]      PSA, Total (Screening) [E]      TSH and Free T4 [E]      Meds This Visit:  Requested Prescriptions      No prescriptions requested or ordered in this encounter       Imaging & Referrals:  None

## 2023-07-26 ENCOUNTER — LAB ENCOUNTER (OUTPATIENT)
Dept: LAB | Age: 58
End: 2023-07-26
Attending: FAMILY MEDICINE
Payer: MEDICAID

## 2023-07-26 DIAGNOSIS — Z12.5 SCREENING FOR MALIGNANT NEOPLASM OF PROSTATE: ICD-10-CM

## 2023-07-26 DIAGNOSIS — I10 ESSENTIAL HYPERTENSION: ICD-10-CM

## 2023-07-26 DIAGNOSIS — N18.30 STAGE 3 CHRONIC KIDNEY DISEASE, UNSPECIFIED WHETHER STAGE 3A OR 3B CKD (HCC): ICD-10-CM

## 2023-07-26 LAB
ALBUMIN SERPL-MCNC: 4 G/DL (ref 3.4–5)
ALBUMIN/GLOB SERPL: 1.2 {RATIO} (ref 1–2)
ALP LIVER SERPL-CCNC: 104 U/L
ALT SERPL-CCNC: 38 U/L
ANION GAP SERPL CALC-SCNC: 5 MMOL/L (ref 0–18)
AST SERPL-CCNC: 22 U/L (ref 15–37)
BASOPHILS # BLD AUTO: 0.15 X10(3) UL (ref 0–0.2)
BASOPHILS NFR BLD AUTO: 1.5 %
BILIRUB SERPL-MCNC: 0.7 MG/DL (ref 0.1–2)
BUN BLD-MCNC: 12 MG/DL (ref 7–18)
CALCIUM BLD-MCNC: 9.5 MG/DL (ref 8.5–10.1)
CHLORIDE SERPL-SCNC: 107 MMOL/L (ref 98–112)
CHOLEST SERPL-MCNC: 155 MG/DL (ref ?–200)
CO2 SERPL-SCNC: 26 MMOL/L (ref 21–32)
COMPLEXED PSA SERPL-MCNC: 1.77 NG/ML (ref ?–4)
CREAT BLD-MCNC: 1.41 MG/DL
EGFRCR SERPLBLD CKD-EPI 2021: 58 ML/MIN/1.73M2 (ref 60–?)
EOSINOPHIL # BLD AUTO: 0.23 X10(3) UL (ref 0–0.7)
EOSINOPHIL NFR BLD AUTO: 2.3 %
ERYTHROCYTE [DISTWIDTH] IN BLOOD BY AUTOMATED COUNT: 13.1 %
FASTING PATIENT LIPID ANSWER: YES
FASTING STATUS PATIENT QL REPORTED: YES
GLOBULIN PLAS-MCNC: 3.4 G/DL (ref 2.8–4.4)
GLUCOSE BLD-MCNC: 117 MG/DL (ref 70–99)
HCT VFR BLD AUTO: 50.8 %
HDLC SERPL-MCNC: 57 MG/DL (ref 40–59)
HGB BLD-MCNC: 16.8 G/DL
IMM GRANULOCYTES # BLD AUTO: 0.03 X10(3) UL (ref 0–1)
IMM GRANULOCYTES NFR BLD: 0.3 %
LDLC SERPL CALC-MCNC: 77 MG/DL (ref ?–100)
LYMPHOCYTES # BLD AUTO: 3.17 X10(3) UL (ref 1–4)
LYMPHOCYTES NFR BLD AUTO: 31.6 %
MAGNESIUM SERPL-MCNC: 2.2 MG/DL (ref 1.6–2.6)
MCH RBC QN AUTO: 29.5 PG (ref 26–34)
MCHC RBC AUTO-ENTMCNC: 33.1 G/DL (ref 31–37)
MCV RBC AUTO: 89.3 FL
MONOCYTES # BLD AUTO: 0.9 X10(3) UL (ref 0.1–1)
MONOCYTES NFR BLD AUTO: 9 %
NEUTROPHILS # BLD AUTO: 5.54 X10 (3) UL (ref 1.5–7.7)
NEUTROPHILS # BLD AUTO: 5.54 X10(3) UL (ref 1.5–7.7)
NEUTROPHILS NFR BLD AUTO: 55.3 %
NONHDLC SERPL-MCNC: 98 MG/DL (ref ?–130)
OSMOLALITY SERPL CALC.SUM OF ELEC: 287 MOSM/KG (ref 275–295)
PHOSPHATE SERPL-MCNC: 2.8 MG/DL (ref 2.5–4.9)
PLATELET # BLD AUTO: 329 10(3)UL (ref 150–450)
POTASSIUM SERPL-SCNC: 4.7 MMOL/L (ref 3.5–5.1)
PROT SERPL-MCNC: 7.4 G/DL (ref 6.4–8.2)
RBC # BLD AUTO: 5.69 X10(6)UL
SODIUM SERPL-SCNC: 138 MMOL/L (ref 136–145)
T4 FREE SERPL-MCNC: 1.1 NG/DL (ref 0.8–1.7)
TRIGL SERPL-MCNC: 121 MG/DL (ref 30–149)
TSI SER-ACNC: 0.4 MIU/ML (ref 0.36–3.74)
VIT D+METAB SERPL-MCNC: 30.7 NG/ML (ref 30–100)
VLDLC SERPL CALC-MCNC: 19 MG/DL (ref 0–30)
WBC # BLD AUTO: 10 X10(3) UL (ref 4–11)

## 2023-07-26 PROCEDURE — 83735 ASSAY OF MAGNESIUM: CPT

## 2023-07-26 PROCEDURE — 84443 ASSAY THYROID STIM HORMONE: CPT

## 2023-07-26 PROCEDURE — 82306 VITAMIN D 25 HYDROXY: CPT

## 2023-07-26 PROCEDURE — 80061 LIPID PANEL: CPT

## 2023-07-26 PROCEDURE — 85025 COMPLETE CBC W/AUTO DIFF WBC: CPT

## 2023-07-26 PROCEDURE — 36415 COLL VENOUS BLD VENIPUNCTURE: CPT

## 2023-07-26 PROCEDURE — 84100 ASSAY OF PHOSPHORUS: CPT

## 2023-07-26 PROCEDURE — 84439 ASSAY OF FREE THYROXINE: CPT

## 2023-07-26 PROCEDURE — 80053 COMPREHEN METABOLIC PANEL: CPT

## 2023-07-26 NOTE — PROGRESS NOTES
Labs with no concerning values. Please notify patient that creatinine is stable.   Renal function is stable.    -Dr. Blaze Stearns

## 2023-08-07 RX ORDER — HYDROCHLOROTHIAZIDE 25 MG/1
25 TABLET ORAL DAILY
Qty: 90 TABLET | Refills: 0 | Status: SHIPPED | OUTPATIENT
Start: 2023-08-07

## 2023-08-10 ENCOUNTER — TELEPHONE (OUTPATIENT)
Dept: SURGERY | Facility: CLINIC | Age: 58
End: 2023-08-10

## 2023-08-10 NOTE — TELEPHONE ENCOUNTER
RN received a fax from [place] with request for additional clinical notes.  RN faxed recent office notes to [phone]

## 2023-08-11 ENCOUNTER — TELEPHONE (OUTPATIENT)
Dept: ADMINISTRATIVE | Age: 58
End: 2023-08-11

## 2023-08-11 NOTE — TELEPHONE ENCOUNTER
This encounter is now closed. See message from [facility] [facility]. RN received a denial letter from [facility]. CPT [id number] denied. \"The request cannot be approved because: It is only supported at one of the following times: 3-12 months after your kidney has been removed (nephrectomy), then once a year for 5 years. \"    Reference#: [id number]  [phone] (Medical Director)     Note, CPT 68678 was also denied, per 7/10/23 letter from [facility].      Completed CT chest/abdomen on 7/5/23    Has scheduled CT on 8/14/23

## 2023-08-11 NOTE — TELEPHONE ENCOUNTER
Hello,    We received a denial for your patient's CT ABDOMEN   . However, [employer] is offering a first level appeal for approval.  At this time, please call them at [phone] option 4 by 9.6    Patient will need to sign a designation form in order for th appeal to be done. Please copy and paste the following site to download the form an have the patient sign. Once signed return back to me so we can start the appeal process. Zainab Mccall your office can call [employer] at [phone] to start this process    [email or web address]      Clinical rationale:    Based on [employer] Imaging Guidelines Section(s): Renal Cell Cancer (RCC) -  Surveillance (ONC 17.4) and Preface to the Imaging Guidelines, section Preface-3.1   Clinical Information, we cannot approve this request. Your healthcare provider told us   that you have been treated for cancer in your kidney(s). The request cannot be   approved because: It is only supported at one of the following times.   -Three to 12 months after your kidney has been removed (nephrectomy). -Then, once a year for five years. Two sets of pictures (without a dye and then with a dye) are not needed to show your   doctor the details needed to treat you. One picture study taken with a dye or without a   dye is sufficient. Julio C Medina.   Referral Specialist  Centralized Prior Authorizations/Referrals  [place]

## 2023-08-15 ENCOUNTER — PATIENT MESSAGE (OUTPATIENT)
Dept: SURGERY | Facility: CLINIC | Age: 58
End: 2023-08-15

## 2023-08-17 ENCOUNTER — HOSPITAL ENCOUNTER (OUTPATIENT)
Dept: CT IMAGING | Age: 58
Discharge: HOME | End: 2023-08-17
Attending: UROLOGY
Payer: MEDICAID

## 2023-08-17 DIAGNOSIS — C64.9 RENAL CELL CARCINOMA, UNSPECIFIED LATERALITY (HCC): ICD-10-CM

## 2023-08-17 PROCEDURE — 74170 CT ABD WO CNTRST FLWD CNTRST: CPT | Performed by: UROLOGY

## 2023-08-17 PROCEDURE — 71260 CT THORAX DX C+: CPT | Performed by: UROLOGY

## 2023-08-17 NOTE — PROGRESS NOTES
Results reviewed. Please inform patient he was supposed to get this CT scan in 1 year - not now. He also needs a f/u appointment with me in ~ 1 y. Let's just plan on f/u appointment with me in 1 y and we can order the CT after that visit.     Thanks,  MPH

## 2023-08-17 NOTE — TELEPHONE ENCOUNTER
Patient completed CT scan. This was done too soon - was to be completed in 2024. No clinical information I can provide to support CT scan being repeated 1 month from last CT scan. Notified by medical director may appeal if desired. Will await notification.

## 2023-08-21 ENCOUNTER — TELEPHONE (OUTPATIENT)
Dept: SURGERY | Facility: CLINIC | Age: 58
End: 2023-08-21

## 2023-08-21 NOTE — TELEPHONE ENCOUNTER
----- Message from Barbra Garcia MD sent at 8/17/2023  9:01 AM CDT -----  Results reviewed. Please inform patient he was supposed to get this CT scan in 1 year - not now. He also needs a f/u appointment with me in ~ 1 y. Let's just plan on f/u appointment with me in 1 y and we can order the CT after that visit.     Thanks,  MPH

## 2023-08-24 NOTE — TELEPHONE ENCOUNTER
----- Message from Isaias Marie MD sent at 8/17/2023  9:01 AM CDT -----  Results reviewed. Please inform patient he was supposed to get this CT scan in 1 year - not now. He also needs a f/u appointment with me in ~ 1 y. Let's just plan on f/u appointment with me in 1 y and we can order the CT after that visit.     Thanks,  MPH

## 2023-08-29 ENCOUNTER — HOSPITAL ENCOUNTER (EMERGENCY)
Age: 58
Discharge: HOME | End: 2023-08-29
Attending: EMERGENCY MEDICINE
Payer: MEDICAID

## 2023-08-29 VITALS
DIASTOLIC BLOOD PRESSURE: 84 MMHG | HEIGHT: 74 IN | WEIGHT: 235 LBS | HEART RATE: 71 BPM | BODY MASS INDEX: 30.16 KG/M2 | RESPIRATION RATE: 18 BRPM | OXYGEN SATURATION: 97 % | SYSTOLIC BLOOD PRESSURE: 146 MMHG | TEMPERATURE: 98 F

## 2023-08-29 DIAGNOSIS — M54.17 LUMBOSACRAL RADICULOPATHY: Primary | ICD-10-CM

## 2023-08-29 PROCEDURE — 99283 EMERGENCY DEPT VISIT LOW MDM: CPT

## 2023-08-29 PROCEDURE — 99284 EMERGENCY DEPT VISIT MOD MDM: CPT

## 2023-08-29 RX ORDER — LIDOCAINE 50 MG/G
1 PATCH TOPICAL EVERY 24 HOURS
Qty: 5 PATCH | Refills: 0 | Status: SHIPPED | OUTPATIENT
Start: 2023-08-29 | End: 2023-09-03

## 2023-08-29 RX ORDER — PREDNISONE 20 MG/1
40 TABLET ORAL ONCE
Status: COMPLETED | OUTPATIENT
Start: 2023-08-29 | End: 2023-08-29

## 2023-08-29 RX ORDER — CYCLOBENZAPRINE HCL 10 MG
10 TABLET ORAL 3 TIMES DAILY PRN
Qty: 20 TABLET | Refills: 0 | Status: SHIPPED | OUTPATIENT
Start: 2023-08-29 | End: 2023-09-05

## 2023-08-29 RX ORDER — PREDNISONE 20 MG/1
40 TABLET ORAL DAILY
Qty: 10 TABLET | Refills: 0 | Status: SHIPPED | OUTPATIENT
Start: 2023-08-29 | End: 2023-09-03

## 2023-08-29 RX ORDER — CYCLOBENZAPRINE HCL 10 MG
10 TABLET ORAL ONCE
Status: COMPLETED | OUTPATIENT
Start: 2023-08-29 | End: 2023-08-29

## 2023-08-29 NOTE — DISCHARGE INSTRUCTIONS
You may take Tylenol in addition to Prednisone and Flexeril  Start Prednisone tomorrow morning as you received your first dose here today  Muscle relaxer can cause drowsiness, try to reserve for bedtime  Application of heat and ice  Gentle stretching  Close outpatient follow up with Primary Care to ensure improvement with plan of care    Return to ER if symptoms should worsen or concerning symptoms arise

## 2023-08-31 NOTE — PROGRESS NOTES
Future Appointments   Date Time Provider Rony Olivas   9/5/2023  8:30 AM CRISTINO Aquino [facility]   9/11/2023 10:00 AM Yessenia Wong PA-C [facility]   9/26/2023  2:20 PM John Prado MD [id number] [address]   10/23/2023 10:00 AM Tommy Mckeon MD [facility] [address]

## 2023-09-03 ENCOUNTER — APPOINTMENT (OUTPATIENT)
Dept: GENERAL RADIOLOGY | Age: 58
End: 2023-09-03
Attending: NURSE PRACTITIONER
Payer: MEDICAID

## 2023-09-03 ENCOUNTER — HOSPITAL ENCOUNTER (EMERGENCY)
Age: 58
Discharge: HOME | End: 2023-09-03
Attending: EMERGENCY MEDICINE
Payer: MEDICAID

## 2023-09-03 VITALS
RESPIRATION RATE: 16 BRPM | OXYGEN SATURATION: 99 % | HEIGHT: 74 IN | TEMPERATURE: 98 F | BODY MASS INDEX: 30.16 KG/M2 | HEART RATE: 65 BPM | SYSTOLIC BLOOD PRESSURE: 139 MMHG | WEIGHT: 235 LBS | DIASTOLIC BLOOD PRESSURE: 89 MMHG

## 2023-09-03 DIAGNOSIS — M54.17 RADICULAR PAIN OF LUMBOSACRAL REGION: Primary | ICD-10-CM

## 2023-09-03 PROCEDURE — 72100 X-RAY EXAM L-S SPINE 2/3 VWS: CPT | Performed by: NURSE PRACTITIONER

## 2023-09-03 PROCEDURE — 99284 EMERGENCY DEPT VISIT MOD MDM: CPT

## 2023-09-03 PROCEDURE — 99283 EMERGENCY DEPT VISIT LOW MDM: CPT

## 2023-09-03 RX ORDER — PREDNISONE 10 MG/1
TABLET ORAL
Qty: 20 TABLET | Refills: 0 | Status: SHIPPED | OUTPATIENT
Start: 2023-09-03 | End: 2023-09-11

## 2023-09-03 RX ORDER — ACETAMINOPHEN 500 MG
1000 TABLET ORAL ONCE
Status: COMPLETED | OUTPATIENT
Start: 2023-09-03 | End: 2023-09-03

## 2023-09-03 RX ORDER — PREDNISONE 20 MG/1
40 TABLET ORAL ONCE
Status: COMPLETED | OUTPATIENT
Start: 2023-09-03 | End: 2023-09-03

## 2023-09-03 RX ORDER — PREDNISONE 20 MG/1
TABLET ORAL
Qty: 10 TABLET | Refills: 0 | Status: SHIPPED | OUTPATIENT
Start: 2023-09-03 | End: 2023-09-11

## 2023-09-03 RX ORDER — CYCLOBENZAPRINE HCL 10 MG
10 TABLET ORAL 3 TIMES DAILY PRN
Qty: 20 TABLET | Refills: 0 | Status: SHIPPED | OUTPATIENT
Start: 2023-09-03 | End: 2023-09-10

## 2023-09-03 NOTE — DISCHARGE INSTRUCTIONS
Start Prednisone taper tomorrow as you received first dose here today    May add Tylenol in addition to Prednisone    Alternate heat and ice applications, gentle stretching and close outpatient follow up with Spine

## 2023-09-03 NOTE — ED INITIAL ASSESSMENT (HPI)
C/o right lower back pain that radiates down right leg - patient sat on chair Friday, chair broke causing him to fall

## 2023-09-05 ENCOUNTER — OFFICE VISIT (OUTPATIENT)
Dept: ORTHOPEDICS CLINIC | Facility: CLINIC | Age: 58
End: 2023-09-05
Payer: MEDICAID

## 2023-09-05 VITALS — WEIGHT: 235 LBS | BODY MASS INDEX: 30.16 KG/M2 | HEIGHT: 74 IN

## 2023-09-05 DIAGNOSIS — R29.2 REFLEX ASYMMETRY: ICD-10-CM

## 2023-09-05 DIAGNOSIS — M54.41 ACUTE RIGHT-SIDED LOW BACK PAIN WITH RIGHT-SIDED SCIATICA: Primary | ICD-10-CM

## 2023-09-05 PROCEDURE — 3008F BODY MASS INDEX DOCD: CPT | Performed by: NURSE PRACTITIONER

## 2023-09-05 PROCEDURE — 99214 OFFICE O/P EST MOD 30 MIN: CPT | Performed by: NURSE PRACTITIONER

## 2023-09-05 NOTE — PATIENT INSTRUCTIONS
Start tylenol over the counter as directed on package. Okay to take Tylenol 1000mg up to three times per day. Do not exceed 3000mg of tylenol per day. Use over the counter SalonPas 4% lidocaine patch or Tiger balm over the counter lidocaine product as directed on the package. Use heat and ice as needed and as tolerated. Continue your home exercise program.   Follow up in clinic as discussed.

## 2023-09-08 ENCOUNTER — OFFICE VISIT (OUTPATIENT)
Dept: PHYSICAL THERAPY | Age: 58
End: 2023-09-08
Attending: NURSE PRACTITIONER
Payer: MEDICAID

## 2023-09-08 DIAGNOSIS — R29.2 REFLEX ASYMMETRY: Primary | ICD-10-CM

## 2023-09-08 DIAGNOSIS — M54.41 ACUTE RIGHT-SIDED LOW BACK PAIN WITH RIGHT-SIDED SCIATICA: ICD-10-CM

## 2023-09-08 PROCEDURE — 97112 NEUROMUSCULAR REEDUCATION: CPT

## 2023-09-08 PROCEDURE — 97162 PT EVAL MOD COMPLEX 30 MIN: CPT

## 2023-09-11 ENCOUNTER — OFFICE VISIT (OUTPATIENT)
Dept: PHYSICAL THERAPY | Age: 58
End: 2023-09-11
Attending: NURSE PRACTITIONER
Payer: MEDICAID

## 2023-09-11 ENCOUNTER — HOSPITAL ENCOUNTER (OUTPATIENT)
Dept: MRI IMAGING | Age: 58
Discharge: HOME | End: 2023-09-11
Attending: NURSE PRACTITIONER
Payer: MEDICAID

## 2023-09-11 DIAGNOSIS — M54.41 ACUTE RIGHT-SIDED LOW BACK PAIN WITH RIGHT-SIDED SCIATICA: ICD-10-CM

## 2023-09-11 DIAGNOSIS — R29.2 REFLEX ASYMMETRY: ICD-10-CM

## 2023-09-11 PROCEDURE — 97110 THERAPEUTIC EXERCISES: CPT

## 2023-09-11 PROCEDURE — 72148 MRI LUMBAR SPINE W/O DYE: CPT | Performed by: NURSE PRACTITIONER

## 2023-09-11 NOTE — PROGRESS NOTES
Diagnosis:   Reflex asymmetry (R29.2)  Acute right-sided low back pain with right-sided sciatica (M54.41)      Referring Provider: Dangelo Clements  Date of Evaluation:    9/8/2023    Precautions:   Cancer, Fall Risk, Hearing impairment, HTN Next MD visit:   none scheduled  Date of Surgery: n/a   Insurance Primary/Secondary: BLUE CROSS MEDICAID / N/A     # Auth Visits: 12v 9/8-11/7    Subjective: Pt reports he has been trying to do the exercises 2x/ day & is feeling better. Pt is accompanied by his wife. Pain: 6-7/10      Objective: See Flowsheet for details  9/11/2023: pt amb into session without any A. D. with improved upright posture & gait pattern noted, less antalgia. Pelvic symmetry      Assessment: Improved tolerance to gait, bed mobility/ transfers noted today. Able to progress with increased strengthening & stretching ex's without increased pain. Education to pt to complete all ex's avoiding pushing through pain.       Goals: (to be met in 12 visits)   Pt reports the ability to sleep through the night waking max of 1x from pain  Pt will improve transversus abdominis recruitment to perform proper isometric contraction without requiring verbal or tactile cuing to promote advancement of therex   Pt will demonstrate good understanding of proper posture and body mechanics to decrease pain and improve spinal safety   Pt will improve lumbar spine AROM flexion to Min restricted or better to allow increase ease with bending forward to don shoes   Pt will report improved symptom centralization and absence of radicular symptoms for 3 consecutive days to improve function with ADLs including community negotiation   Pt reports pain at worst 4/10 with standing >30 minutes for work and home activities  Pt will demonstrate improved core strength to be able to perform reciprocal stair negotiation with proper mechanics   Pt will be independent and compliant with comprehensive HEP to maintain progress achieved in PT      Plan: Pt scheduled to have MRI this evening - assess for results  Date: 9/11/2023  TX#: 2/12 Date:                 TX#: 3/ Date:                 TX#: 4/ Date:                 TX#: 5/ Date: Tx#: 6/   Therex: 40'  NuStep L3 x 5'  Education: refrain from going to gym/ personal training or massages for now. Need to wait for results of MRI & for more consistent reduction of symptoms. Recommend pt avoid prolonged car drive > 1 hr to work right now if able. Seated HS stretch 15\" x 3 ea - HEP  Seated figure-4 piriformis stretch 15\" x 3 ea - HEP  Flat back stretch 30\" x 3 - HEP  LTR x 3'  Hip Abd Amanda 5\", 3x10 purple pilates ring  Supine active HS stretch 5\" x 10 ea - HEP  Supine modified GANGA hip ER stretch 3x30\" ea - HEP  Supine crossover piriformis stretch 3x30\" ea - HEP       HEP: IE: MET technique to correct pelvic obliquity of R anterior ilial rotation (in supine, with dowel) with shotgun MET pubic symphysis with hip Abduction/ Adduction isometric  9/11/2023: seated HS stretch, seated figure-4 piriformis stretch, supine active HS stretch, supine crossover piriformis stretch, supine modified GANGA hip ER stretch, standing flat back stretch    Charges:  Therex x 3       Total Timed Treatment: 40 min  Total Treatment Time: 40 min

## 2023-09-12 ENCOUNTER — PATIENT MESSAGE (OUTPATIENT)
Dept: ORTHOPEDICS CLINIC | Facility: CLINIC | Age: 58
End: 2023-09-12

## 2023-09-15 ENCOUNTER — OFFICE VISIT (OUTPATIENT)
Dept: PHYSICAL THERAPY | Age: 58
End: 2023-09-15
Attending: NURSE PRACTITIONER
Payer: MEDICAID

## 2023-09-15 ENCOUNTER — OFFICE VISIT (OUTPATIENT)
Dept: PAIN CLINIC | Facility: CLINIC | Age: 58
End: 2023-09-15
Payer: MEDICAID

## 2023-09-15 VITALS
BODY MASS INDEX: 29 KG/M2 | WEIGHT: 225 LBS | OXYGEN SATURATION: 98 % | DIASTOLIC BLOOD PRESSURE: 84 MMHG | SYSTOLIC BLOOD PRESSURE: 140 MMHG | HEART RATE: 113 BPM

## 2023-09-15 DIAGNOSIS — M54.16 LUMBAR RADICULOPATHY: Primary | ICD-10-CM

## 2023-09-15 PROCEDURE — 97110 THERAPEUTIC EXERCISES: CPT

## 2023-09-15 RX ORDER — TRAMADOL HYDROCHLORIDE 50 MG/1
50 TABLET ORAL EVERY 8 HOURS PRN
Qty: 30 TABLET | Refills: 0 | Status: SHIPPED | OUTPATIENT
Start: 2023-09-15

## 2023-09-15 RX ORDER — KETOROLAC TROMETHAMINE 30 MG/ML
30 INJECTION, SOLUTION INTRAMUSCULAR; INTRAVENOUS ONCE
Status: COMPLETED | OUTPATIENT
Start: 2023-09-15 | End: 2023-09-15

## 2023-09-15 RX ADMIN — KETOROLAC TROMETHAMINE 30 MG: 30 INJECTION, SOLUTION INTRAMUSCULAR; INTRAVENOUS at 13:21:00

## 2023-09-18 ENCOUNTER — OFFICE VISIT (OUTPATIENT)
Dept: PHYSICAL THERAPY | Age: 58
End: 2023-09-18
Attending: NURSE PRACTITIONER
Payer: MEDICAID

## 2023-09-18 PROCEDURE — 97110 THERAPEUTIC EXERCISES: CPT

## 2023-09-19 ENCOUNTER — OFFICE VISIT (OUTPATIENT)
Dept: ORTHOPEDICS CLINIC | Facility: CLINIC | Age: 58
End: 2023-09-19
Payer: MEDICAID

## 2023-09-19 ENCOUNTER — TELEPHONE (OUTPATIENT)
Dept: PAIN CLINIC | Facility: CLINIC | Age: 58
End: 2023-09-19

## 2023-09-19 VITALS — WEIGHT: 225 LBS | BODY MASS INDEX: 28.88 KG/M2 | HEIGHT: 74 IN

## 2023-09-19 DIAGNOSIS — M54.41 CHRONIC RIGHT-SIDED LOW BACK PAIN WITH RIGHT-SIDED SCIATICA: Primary | ICD-10-CM

## 2023-09-19 DIAGNOSIS — M54.16 LUMBAR RADICULITIS: Primary | ICD-10-CM

## 2023-09-19 DIAGNOSIS — G89.29 CHRONIC RIGHT-SIDED LOW BACK PAIN WITH RIGHT-SIDED SCIATICA: Primary | ICD-10-CM

## 2023-09-19 PROCEDURE — 99213 OFFICE O/P EST LOW 20 MIN: CPT | Performed by: NURSE PRACTITIONER

## 2023-09-19 PROCEDURE — 3008F BODY MASS INDEX DOCD: CPT | Performed by: NURSE PRACTITIONER

## 2023-09-19 NOTE — TELEPHONE ENCOUNTER
Prior authorization request completed for:  right L4,L5 TESI  Authorization # [id number]  Authorization dates: 9/19/2023-11/18/2023  CPT codes approved: 61578,58060  Number of visits/dates of service approved: 1  Physician: EARL  Location: [facility]         Patient can be scheduled. Routed to Navigator.

## 2023-09-22 ENCOUNTER — APPOINTMENT (OUTPATIENT)
Dept: PHYSICAL THERAPY | Age: 58
End: 2023-09-22
Attending: NURSE PRACTITIONER
Payer: MEDICAID

## 2023-09-26 ENCOUNTER — OFFICE VISIT (OUTPATIENT)
Dept: PHYSICAL THERAPY | Age: 58
End: 2023-09-26
Attending: NURSE PRACTITIONER
Payer: MEDICAID

## 2023-09-26 ENCOUNTER — OFFICE VISIT (OUTPATIENT)
Dept: NEPHROLOGY | Facility: CLINIC | Age: 58
End: 2023-09-26
Payer: MEDICAID

## 2023-09-26 VITALS — DIASTOLIC BLOOD PRESSURE: 70 MMHG | BODY MASS INDEX: 29 KG/M2 | WEIGHT: 228.5 LBS | SYSTOLIC BLOOD PRESSURE: 162 MMHG

## 2023-09-26 DIAGNOSIS — N18.9 VITAMIN D DEFICIENCY DUE TO CHRONIC KIDNEY DISEASE: ICD-10-CM

## 2023-09-26 DIAGNOSIS — N18.30 STAGE 3 CHRONIC KIDNEY DISEASE, UNSPECIFIED WHETHER STAGE 3A OR 3B CKD (HCC): Primary | ICD-10-CM

## 2023-09-26 DIAGNOSIS — E55.9 VITAMIN D DEFICIENCY DUE TO CHRONIC KIDNEY DISEASE: ICD-10-CM

## 2023-09-26 PROCEDURE — 3077F SYST BP >= 140 MM HG: CPT | Performed by: INTERNAL MEDICINE

## 2023-09-26 PROCEDURE — 99213 OFFICE O/P EST LOW 20 MIN: CPT | Performed by: INTERNAL MEDICINE

## 2023-09-26 PROCEDURE — 97110 THERAPEUTIC EXERCISES: CPT

## 2023-09-26 PROCEDURE — 3078F DIAST BP <80 MM HG: CPT | Performed by: INTERNAL MEDICINE

## 2023-09-26 RX ORDER — GABAPENTIN 100 MG/1
CAPSULE ORAL
Qty: 60 CAPSULE | Refills: 1 | Status: SHIPPED | OUTPATIENT
Start: 2023-09-26

## 2023-09-26 NOTE — PROGRESS NOTES
Diagnosis:   Reflex asymmetry (R29.2)  Acute right-sided low back pain with right-sided sciatica (M54.41)      Referring Provider: Sera Chen  Date of Evaluation:    9/8/2023    Precautions:   Cancer, Fall Risk, Hearing impairment, HTN Next MD visit:   none scheduled  Date of Surgery: n/a   Insurance Primary/Secondary: BLUE CROSS MEDICAID / N/A     # Auth Visits: 12v 9/8-11/7    Subjective: Pt reports he does still have pain. Injection scheduled for 9/28. Pain is now bearable - can walk, can work, Doing the ex's at home    Pain: 4-5/10      Objective: See Flowsheet for details  9/11/2023: pt amb into session without any A. D. with improved upright posture & gait pattern noted, less antalgia. Pelvic symmetry      Assessment: No pain increased with session. Able to continue with increased WB progression/ standing ex's for more functional gains.       Goals: (to be met in 12 visits)   Pt reports the ability to sleep through the night waking max of 1x from pain  Pt will improve transversus abdominis recruitment to perform proper isometric contraction without requiring verbal or tactile cuing to promote advancement of therex   Pt will demonstrate good understanding of proper posture and body mechanics to decrease pain and improve spinal safety   Pt will improve lumbar spine AROM flexion to Min restricted or better to allow increase ease with bending forward to don shoes   Pt will report improved symptom centralization and absence of radicular symptoms for 3 consecutive days to improve function with ADLs including community negotiation   Pt reports pain at worst 4/10 with standing >30 minutes for work and home activities  Pt will demonstrate improved core strength to be able to perform reciprocal stair negotiation with proper mechanics   Pt will be independent and compliant with comprehensive HEP to maintain progress achieved in PT      Plan: Pt scheduled for injection on 9/28 Thursday - pt was advised to reach out to office if he cannot attend appt on Friday per physician's approval post-injection  Date: 9/11/2023  TX#: 2/12 Date: 9/15/2023         TX#: 3/12 Date: 9/18/2023  TX#: 4/12 Date: 9/26/2023            TX#: 5/12 Date: Tx#: 6/   Therex: 40'  NuStep L3 x 5'  Education: refrain from going to gym/ personal training or massages for now. Need to wait for results of MRI & for more consistent reduction of symptoms. Recommend pt avoid prolonged car drive > 1 hr to work right now if able.   Seated HS stretch 15\" x 3 ea - HEP  Seated figure-4 piriformis stretch 15\" x 3 ea - HEP  Flat back stretch 30\" x 3 - HEP  LTR x 3'  Hip Abd Amanda 5\", 3x10 purple pilates ring  Supine active HS stretch 5\" x 10 ea - HEP  Supine modified GANGA hip ER stretch 3x30\" ea - HEP  Supine crossover piriformis stretch 3x30\" ea - HEP Therex: 41'  NuStep L4 x 5'  Flat back stretch 30\" x 3  Seated lumbar flexion with Swiss Ball, 5\" x 15  Seated HS stretch 20\" x 3 ea  Seated figure-4 piriformis stretch 15\" x 3 ea  Hip Abd Amanda 5\", 3x10 purple pilates ring  Supine modified GANGA hip ER stretch 3x30\" ea  LTR x 3'  Clams 2x10 ea  Bridge x 20  Supine active HS stretch 5\" x 10 ea Therex: 44'  NuStep L4 x 6'  Hip Abd Amanda 5\", 3x10 black (higher resistance) pilates ring  SB HS curl 3\" x 30  SB bridge 3\" x 20  Seated lumbar flexion with Swiss Ball, 5\" x 30  Supine modified GANGA hip ER stretch 3x30\" ea  Supine crossover piriformis stretch 3x30\" ea  Slantboard gastroc stretch 3x30\" B  In // bars:  Standing marches x 20  Standing hip Abd raises x 20 ea Therex: 33'  NuStep L5 x 6'  SB HS curl 3\" x 30  SB bridge 3\" x 30  LTR with BLE on Swiss Ball 5\" hold, x 3'  S/L hip Abd x 20 ea  In // bars:  Standing marches x 30  Standing hip Abd raises x 30 ea  Standing hip Ext raises x 30 ea  Standing row x 20 G  Standing shld ext x 20 Y  Lateral walkouts x 10 ea G  Paloff press x 20 ea G    HEP: IE: MET technique to correct pelvic obliquity of R anterior ilial rotation (in supine, with darryl) with shotgun MET pubic symphysis with hip Abduction/ Adduction isometric  9/11/2023: seated HS stretch, seated figure-4 piriformis stretch, supine active HS stretch, supine crossover piriformis stretch, supine modified GANGA hip ER stretch, standing flat back stretch    Charges:  Therex x 2       Total Timed Treatment: 33 min  Total Treatment Time: 33 min

## 2023-09-28 ENCOUNTER — HOSPITAL ENCOUNTER (OUTPATIENT)
Facility: HOSPITAL | Age: 58
Setting detail: HOSPITAL OUTPATIENT SURGERY
Discharge: HOME | End: 2023-09-28
Attending: ANESTHESIOLOGY | Admitting: ANESTHESIOLOGY
Payer: MEDICAID

## 2023-09-28 ENCOUNTER — APPOINTMENT (OUTPATIENT)
Dept: GENERAL RADIOLOGY | Facility: HOSPITAL | Age: 58
End: 2023-09-28
Attending: ANESTHESIOLOGY
Payer: MEDICAID

## 2023-09-28 VITALS
OXYGEN SATURATION: 99 % | DIASTOLIC BLOOD PRESSURE: 84 MMHG | HEART RATE: 80 BPM | TEMPERATURE: 97 F | HEIGHT: 74 IN | WEIGHT: 228 LBS | BODY MASS INDEX: 29.26 KG/M2 | RESPIRATION RATE: 18 BRPM | SYSTOLIC BLOOD PRESSURE: 146 MMHG

## 2023-09-28 PROCEDURE — 3E0S33Z INTRODUCTION OF ANTI-INFLAMMATORY INTO EPIDURAL SPACE, PERCUTANEOUS APPROACH: ICD-10-PCS | Performed by: ANESTHESIOLOGY

## 2023-09-28 PROCEDURE — 64483 NJX AA&/STRD TFRM EPI L/S 1: CPT | Performed by: ANESTHESIOLOGY

## 2023-09-28 PROCEDURE — 64484 NJX AA&/STRD TFRM EPI L/S EA: CPT | Performed by: ANESTHESIOLOGY

## 2023-09-28 RX ORDER — DEXAMETHASONE SODIUM PHOSPHATE 10 MG/ML
INJECTION, SOLUTION INTRAMUSCULAR; INTRAVENOUS
Status: DISCONTINUED | OUTPATIENT
Start: 2023-09-28 | End: 2023-09-28

## 2023-09-28 RX ORDER — SODIUM CHLORIDE 9 MG/ML
INJECTION INTRAVENOUS
Status: DISCONTINUED | OUTPATIENT
Start: 2023-09-28 | End: 2023-09-28

## 2023-09-28 RX ORDER — LIDOCAINE HYDROCHLORIDE 10 MG/ML
INJECTION, SOLUTION EPIDURAL; INFILTRATION; INTRACAUDAL; PERINEURAL
Status: DISCONTINUED | OUTPATIENT
Start: 2023-09-28 | End: 2023-09-28

## 2023-09-28 NOTE — DISCHARGE INSTRUCTIONS
Home Care Instructions Following Your Pain Procedure     Tammy Whittaker,  It has been a pleasure to have you as our patient. To help you at home, you must follow these general discharge instructions. We will review these with you before you are discharged. It is our hope that you have a complete and uneventful recovery from our procedure. General Instructions:  What to Expect:  Bandages from your procedure today can be removed when you get home. Please avoid soaking and/or swimming for 24 hours. Showering is okay  It is normal to have increased pain symptoms and/or pain at injection site for up to 3-5 days after procedure, you can use heat or ice (20 minutes on 20 minutes off) for comfort. You may experience some temporary side effects which may include restlessness or insomnia, flushing of the face, or heart palpitations. Please contact the provider if these symptoms do not resolve within 3-4 days. Lightheadedness or nausea may occur and should resolve within 24 to 48 hours. If you develop a headache after treatment, rest, drink fluids (with caffeine, if possible) and take mild over-the-counter pain medication. If the headache does not improve with the above treatment, contact the physician. Home Medications:  Resume all previously prescribed medication. Please avoid taking NSAIDs (Non-Steriodal Anti-Inflammatory Drugs) such as:  Ibuprofen ( Advil, Motrin) Aleve (Naproxen), Diclofenac, Meloxicam for 6 hours after procedure. If you are on Coumadin (Warfarin) or any other anti-coagulant (or \"blood thinning\") medication such as Plavix (Clopidogrel), Xarelto (Rivaroxaban), Eliquis (Apixaban), Effient (Prasugrel) etc., restart on the following day from the procedure unless otherwise directed by your provider. If you are a diabetic, please increase the frequency of your glucose monitoring after the procedure as steroids may cause a temporary (2-3 day) increase in your blood sugar.   Contact your primary care physician if your blood sugar remains elevated as you may require some medication adjustment. Diet:  Resume your regular diet as tolerated. Activity: We recommend that you relax and rest during the rest of your procedure day. If you feel weakness in your arms or legs do not drive. Follow-up Appointment  Please schedule a follow-up visit within 3 to 4 weeks after your last procedure date. Question or Concerns:  Feel free to call our office with any questions or concerns at [phone] (option #2)    Lilian Calderon  Thank you for coming to [facility] for your procedure. The nurses try very hard to make sure you receive the best care possible. Your trust in them as well as us is greatly appreciated.     Thanks so much,   Dr. Cheyenne Barclay

## 2023-09-28 NOTE — OPERATIVE REPORT
[facility]  Operative Report  2023     Micah Hernandez Patient Status:  Hospital Outpatient Surgery    1965 MRN [id number]   Location [id number] [place] [address] Attending Keon Vieyra MD   Hosp Day # 0 PCP Harry Ridley MD     Indication: Emma Ray is a 61year old male with lumbar radiculitis    Preoperative Diagnosis:  Lumbar radiculitis [M54.16]    Postoperative Diagnosis: Same as above. Procedure performed: RIGHT LUMBAR 4-5, LUMBAR 5-SACRAL 1 TRANSFORAMINAL EPIDURAL STEROID INJECTION MULTIPLE LEVEL with local     Anesthesia: Local .    EBL: Less than 1 ml. Procedure Description:  After reviewing the patient's history and performing a focused physical examination, the diagnosis was confirmed and contraindications such as infection and coagulopathy were ruled out. Following review of potential side effects and complications, including but not necessarily limited to infection, allergic reaction, local tissue breakdown, nerve injury, and paresis, the patient indicated they understood and agreed to proceed. After obtaining the informed consent, the patient was brought to the procedure room and monitored. In the prone position, following sterile prep and drape of the lumbar region,  the  L4 neural foramen was identified under fluoroscopy. The skin and subcutaneous tissue was anesthetized via 25-gauge 1.5\" needle with approximately 2 cc of 1% lidocaine. A 22-gauge 5\" Quincke spinal needle was introduced toward the inferior aspect of the junction between the transverse process and pedicle of the  L4 level atraumatically under fluoroscopic guidance. The needle was advanced into the anterior epidural space at this level. The needle position was confirmed under AP and lateral fluoroscopic view. Following negative aspiration for CSF and blood, approximately 1 cc of Omnipaque 240 was injected.   An excellent contrast spread along the epidural space and the nerve root was obtained. At this point, 1cc of normal saline with 5 mg of dexamethasone was injected without complication. The needle was withdrawn with stylet in situ after being flushed with 1 cc PF lidocaine. The  L5 neural foramen was also identified under fluoroscopy. The skin and subcutaneous tissue was anesthetized via 25-gauge 1.5\" needle with approximately 2 cc of 1% lidocaine. A 22-gauge 5\" Quincke spinal needle was introduced toward the inferior aspect of the junction between the transverse process and pedicle of the L5 level atraumatically under fluoroscopic guidance. The needle was advanced into the anterior epidural space at this level. The needle position was confirmed under AP and lateral fluoroscopic view. Following negative aspiration for CSF and blood, approximately 1 cc of Omnipaque 240 was injected. An excellent contrast spread along the epidural space and the nerve root was obtained. At this point, 1cc of normal saline with 5 mg of dexamethasone was injected without complication. The needle was withdrawn with stylet in situ after being flushed with 1 cc PF lidocaine. .  The patient tolerated procedure very well. The patient was observed until discharge criteria met. Discharge instructions were given and patient was released to a responsible adult. Complications: None. Follow up: The patient was followed in the pain clinic as needed basis.         Kim Marti MD

## 2023-09-28 NOTE — H&P
History & Physical Examination    Patient Name: Nallely Maher  MRN: [id number]  [facility]: [id number]  YOB: 1965    Pre-Operative Diagnosis:  Lumbar radiculitis [M54.16]    Present Illness: Patient with lumbar radiculitis    [COMPLETED] ketorolac (Toradol) 30 MG/ML injection 30 mg, 30 mg, Intramuscular, Once, Michelle Machado MD, 30 mg at 09/15/23 1321    aspirin 325 MG Oral Tab, Take 1 tablet (325 mg total) by mouth daily. , Disp: , Rfl: , 2023 at 0900  gabapentin 100 MG Oral Cap, Pt will start the drug @ 100 mg nightly  x 5 days, then increase to 200 mg nightly., Disp: 60 capsule, Rfl: 1  traMADol 50 MG Oral Tab, Take 1 tablet (50 mg total) by mouth every 8 (eight) hours as needed for Pain., Disp: 30 tablet, Rfl: 0  [] predniSONE 20 MG Oral Tab, Take 2 tablets (40 mg total) by mouth daily for 2 days, THEN 1.5 tablets (30 mg total) daily for 2 days, THEN 1 tablet (20 mg total) daily for 2 days, THEN 0.5 tablets (10 mg total) daily for 2 days. , Disp: 10 tablet, Rfl: 0  [] cyclobenzaprine 10 MG Oral Tab, Take 1 tablet (10 mg total) by mouth 3 (three) times daily as needed for Muscle spasms. , Disp: 20 tablet, Rfl: 0  [] predniSONE 10 MG Oral Tab, Take 4 tablets (40 mg total) by mouth daily for 2 days, THEN 3 tablets (30 mg total) daily for 2 days, THEN 2 tablets (20 mg total) daily for 2 days, THEN 1 tablet (10 mg total) daily for 2 days. , Disp: 20 tablet, Rfl: 0  [] predniSONE 20 MG Oral Tab, Take 2 tablets (40 mg total) by mouth daily for 5 days. , Disp: 10 tablet, Rfl: 0  [] cyclobenzaprine 10 MG Oral Tab, Take 1 tablet (10 mg total) by mouth 3 (three) times daily as needed for Muscle spasms. , Disp: 20 tablet, Rfl: 0  [] lidocaine 5 % External Patch, Place 1 patch onto the skin daily for 5 days. , Disp: 5 patch, Rfl: 0  hydroCHLOROthiazide 25 MG Oral Tab, Take 1 tablet (25 mg total) by mouth daily. , Disp: 90 tablet, Rfl: 0  Tadalafil (CIALIS) 20 MG Oral Tab, Take 1 tablet (20 mg total) by mouth daily as needed for Erectile Dysfunction. , Disp: 30 tablet, Rfl: 5  [] diclofenac 1 % External Gel, Apply 2 g topically 4 (four) times daily. , Disp: 240 g, Rfl: 2  PARoxetine 20 MG Oral Tab, Take 1 tablet (20 mg total) by mouth every morning., Disp: 90 tablet, Rfl: 1  clobetasol 0.05 % External Cream, Apply 1 Application topically 2 (two) times daily. , Disp: 60 g, Rfl: 1  Multiple Vitamin (ONE-DAILY MULTI VITAMINS) Oral Tab, Take 1 tablet by mouth daily. , Disp: , Rfl:       No current facility-administered medications for this encounter. Allergies: No Known Allergies    Past Medical History:   Diagnosis Date    Carcinoid tumor of kidney     Essential hypertension      Past Surgical History:   Procedure Laterality Date    NEPHRECTOMY Right 2020     Family History   Problem Relation Age of Onset    Cancer Father         prostate    Heart Disease Father     Diabetes Mother     Arthritis Mother      Social History    Tobacco Use      Smoking status: Never      Smokeless tobacco: Never    Alcohol use: Never      SYSTEM Check if Review is Normal Check if Physical Exam is Normal If not normal, please explain:   HEENT [x ] [x ]    NECK & BACK [x ] [x ]    HEART [x ] [x ]    LUNGS [x ] [x ]    ABDOMEN [x ] [x ]    UROGENITAL [x ] [x ]    EXTREMITIES [x ] [x ]    OTHER        [ x ] I have discussed the risks and benefits and alternatives with the patient/family. They understand and agree to proceed with plan of care. [ x ] I have reviewed the History and Physical done within the last 30 days. Any changes noted above.     Brenton Peacock MD

## 2023-09-29 ENCOUNTER — APPOINTMENT (OUTPATIENT)
Dept: PHYSICAL THERAPY | Age: 58
End: 2023-09-29
Attending: NURSE PRACTITIONER
Payer: MEDICAID

## 2023-09-29 ENCOUNTER — TELEPHONE (OUTPATIENT)
Dept: PAIN CLINIC | Facility: CLINIC | Age: 58
End: 2023-09-29

## 2023-09-29 NOTE — TELEPHONE ENCOUNTER
Courtesy called placed to patient for post procedure follow up. Patient stated he is doing good. Pt verbalized understanding to call with any questions or concerns.       Procedure: Right L4/5,L5/S1 TLESI   Date: 09/28/2023  Follow up Visit Scheduled: 10/11/2023 @ 08:45 AM w/

## 2023-10-03 ENCOUNTER — OFFICE VISIT (OUTPATIENT)
Dept: PHYSICAL THERAPY | Age: 58
End: 2023-10-03
Attending: NURSE PRACTITIONER
Payer: MEDICAID

## 2023-10-03 PROCEDURE — 97110 THERAPEUTIC EXERCISES: CPT

## 2023-10-03 NOTE — PROGRESS NOTES
Diagnosis:   Reflex asymmetry (R29.2)  Acute right-sided low back pain with right-sided sciatica (M54.41)      Referring Provider: Rocio Brunson  Date of Evaluation:    9/8/2023    Precautions:   Cancer, Fall Risk, Hearing impairment, HTN Next MD visit:   10/11/2023 follow up post-injection  Date of Surgery: n/a   Insurance Primary/Secondary: [id number] [facility] / N/A     # Auth Visits: 12v 9/8-11/7     Discharge Summary  Pt has attended 6 visits in Physical Therapy. Subjective: Pt reports much better than before, just slight pain. \"Heaviness\" to the R . Injection was on 9/28. Pain: 3-4/10      Objective: See Flowsheet for details  10/3/2023  Lumbar AROM: (* denotes performed with pain)  Flexion: WFL no pain    Gait: no apparent antalgia, upright posture sustained, no A.D. Assessment: Pt has met all therapy goals & is appropriate to transition to HEP. Pt is in agreement with plan for discharge. Discussion re: current HEP; pt verbalizes understanding w/ HEP & ability to self-alter as appropriate. Pt advised to contact PT if questions/ concerns arise while performing HEP. Pt advised to follow up with referring provider and/ or PCP if symptoms increase despite adherence to HEP. Pt is aware that if symptoms recur or increase, pt may be appropriate to return to skilled PT under new POC w/ updated RX if deemed medically necessary. Pt is in agreement with discharge plans. Thank you.       Goals: (to be met in 12 visits)   Pt reports the ability to sleep through the night waking max of 1x from pain - met  Pt will improve transversus abdominis recruitment to perform proper isometric contraction without requiring verbal or tactile cuing to promote advancement of therex - met  Pt will demonstrate good understanding of proper posture and body mechanics to decrease pain and improve spinal safety - met  Pt will improve lumbar spine AROM flexion to Min restricted or better to allow increase ease with bending forward to don shoes - met  Pt will report improved symptom centralization and absence of radicular symptoms for 3 consecutive days to improve function with ADLs including community negotiation - met  Pt reports pain at worst 4/10 with standing >30 minutes for work and home activities - met  Pt will demonstrate improved core strength to be able to perform reciprocal stair negotiation with proper mechanics - met  Pt will be independent and compliant with comprehensive HEP to maintain progress achieved in PT - met    Plan: Discharge to HEP  LEFS Score  LEFS Score: 35 % (9/7/2023 11:29 PM)    Post LEFS Score  Post LEFS Score: 96.25 % (10/3/2023  8:40 AM)    61.25 % improvement    Patient/Family/Caregiver was advised of these findings, precautions, and treatment options and has agreed to actively participate in planning and for this course of care. Thank you for your referral. If you have any questions, please contact me at Dept: [phone]. Sincerely,  Electronically signed by therapist: Ramón Redmond, PT     Physician's certification required:  No  Please co-sign or sign and return this letter via fax as soon as possible to [phone]. I certify the need for these services furnished under this plan of treatment and while under my care. X___________________________________________________ Date____________________    Certification From: 64/6/7789  To:1/1/2024     Date: 9/11/2023  TX#: 2/12 Date: 9/15/2023         TX#: 3/12 Date: 9/18/2023  TX#: 4/12 Date: 9/26/2023            TX#: 5/12 Date: 10/3/2023  Tx#: 6/12  Discharge   Therex: 40'  NuStep L3 x 5'  Education: refrain from going to gym/ personal training or massages for now. Need to wait for results of MRI & for more consistent reduction of symptoms. Recommend pt avoid prolonged car drive > 1 hr to work right now if able.   Seated HS stretch 15\" x 3 ea - HEP  Seated figure-4 piriformis stretch 15\" x 3 ea - HEP  Flat back stretch 30\" x 3 - HEP  LTR x 3'  Hip Abd Amanda 5\", 3x10 purple pilates ring  Supine active HS stretch 5\" x 10 ea - HEP  Supine modified GANGA hip ER stretch 3x30\" ea - HEP  Supine crossover piriformis stretch 3x30\" ea - HEP Therex: 41'  NuStep L4 x 5'  Flat back stretch 30\" x 3  Seated lumbar flexion with Swiss Ball, 5\" x 15  Seated HS stretch 20\" x 3 ea  Seated figure-4 piriformis stretch 15\" x 3 ea  Hip Abd Amanda 5\", 3x10 purple pilates ring  Supine modified GANGA hip ER stretch 3x30\" ea  LTR x 3'  Clams 2x10 ea  Bridge x 20  Supine active HS stretch 5\" x 10 ea Therex: 39'  NuStep L4 x 6'  Hip Abd Amanda 5\", 3x10 black (higher resistance) pilates ring  SB HS curl 3\" x 30  SB bridge 3\" x 20  Seated lumbar flexion with Swiss Ball, 5\" x 30  Supine modified GANGA hip ER stretch 3x30\" ea  Supine crossover piriformis stretch 3x30\" ea  Slantboard gastroc stretch 3x30\" B  In // bars:  Standing marches x 20  Standing hip Abd raises x 20 ea Therex: 33'  NuStep L5 x 6'  SB HS curl 3\" x 30  SB bridge 3\" x 30  LTR with BLE on Murleen Montserrat 5\" hold, x 3'  S/L hip Abd x 20 ea  In // bars:  Standing marches x 30  Standing hip Abd raises x 30 ea  Standing hip Ext raises x 30 ea  Standing row x 20 G  Standing shld ext x 20 Y  Lateral walkouts x 10 ea G  Paloff press x 20 ea G Therex: 40'  NuStep L6 x 6'  Slantboard gastroc stretch 3x30\" B  In // bars:  Standing marches x 40  Standing hip Abd raises x 30 ea with Y cuff band @ ankles  Standing hip Ext raises x 30 ea with Y cuff band @ ankles  Standing row x 30 G  Standing shld ext x 30 G  Lateral walkouts x 10 ea G  Paloff press 2x15 ea G  SB HS curl 3\" x 30  SB bridge 3\" x 30  LTR with BLE on Swiss Ball 5\" hold, x 3'  Discharge Instructions  Re-assessment  HEP review.  Denied need for printout   HEP: IE: MET technique to correct pelvic obliquity of R anterior ilial rotation (in supine, with dowel) with shotgun MET pubic symphysis with hip Abduction/ Adduction isometric  9/11/2023: seated HS stretch, seated figure-4 piriformis stretch, supine active HS stretch, supine crossover piriformis stretch, supine modified GANGA hip ER stretch, standing flat back stretch    Charges:  Therex x 3      Total Timed Treatment: 40 min  Total Treatment Time: 40 min

## 2023-10-11 ENCOUNTER — OFFICE VISIT (OUTPATIENT)
Dept: PAIN CLINIC | Facility: CLINIC | Age: 58
End: 2023-10-11
Payer: MEDICAID

## 2023-10-11 VITALS — DIASTOLIC BLOOD PRESSURE: 78 MMHG | SYSTOLIC BLOOD PRESSURE: 140 MMHG | HEART RATE: 77 BPM | OXYGEN SATURATION: 96 %

## 2023-10-11 DIAGNOSIS — M54.16 LUMBAR RADICULOPATHY: Primary | ICD-10-CM

## 2023-10-11 PROCEDURE — 99214 OFFICE O/P EST MOD 30 MIN: CPT | Performed by: ANESTHESIOLOGY

## 2023-10-11 PROCEDURE — 3078F DIAST BP <80 MM HG: CPT | Performed by: ANESTHESIOLOGY

## 2023-10-11 PROCEDURE — 3077F SYST BP >= 140 MM HG: CPT | Performed by: ANESTHESIOLOGY

## 2023-10-11 NOTE — PROGRESS NOTES
Name: Davie Solis   : 1965   DOS: 10/11/2023     Pain Clinic Follow Up Visit:   Patient presents with:  Procedure Follow Up: FU after Right TLESI (L4-5, L5-S1)      Davie Solis is a 61year old male with a history of lumbar radiculopathy following up after transfer epidural steroid injection. The patient reports 80% improvement in pain symptoms. Overall, he is very happy with results of the procedure. This has significantly improved his level of function. Pt denies any chills, fever, or weakness. There is no bladder or bowel incontinence associated with the pain. REVIEW OF SYSTEMS:  A ten point review of systems was performed with pertinent positives and negatives in the HPI. No Known Allergies    Current Outpatient Medications   Medication Sig Dispense Refill    gabapentin 100 MG Oral Cap Pt will start the drug @ 100 mg nightly  x 5 days, then increase to 200 mg nightly. 60 capsule 1    traMADol 50 MG Oral Tab Take 1 tablet (50 mg total) by mouth every 8 (eight) hours as needed for Pain. 30 tablet 0    Tadalafil (CIALIS) 20 MG Oral Tab Take 1 tablet (20 mg total) by mouth daily as needed for Erectile Dysfunction. 30 tablet 5    PARoxetine 20 MG Oral Tab Take 1 tablet (20 mg total) by mouth every morning. 90 tablet 1    clobetasol 0.05 % External Cream Apply 1 Application topically 2 (two) times daily. 60 g 1    aspirin 325 MG Oral Tab Take 1 tablet (325 mg total) by mouth daily. Multiple Vitamin (ONE-DAILY MULTI VITAMINS) Oral Tab Take 1 tablet by mouth daily. hydroCHLOROthiazide 25 MG Oral Tab Take 1 tablet (25 mg total) by mouth daily. (Patient not taking: Reported on 10/11/2023) 90 tablet 0         EXAM:   /78 (BP Location: Left arm, Patient Position: Sitting)   Pulse 77   SpO2 96%   General:  Patient is a(n) 61year old year old male in no acute distress.   Neurologic[de-identified] WNL-Orientation to time, place and person, normal mood & affect, concentration & attention span intact. Inspection:  Ambulates with well-coordinated, fluid, non-antalgic gait. Gait is normal.  Neck: Full range of motion  Cranial nerve: Grossly intact  Respiratory: Speech nonlabored  Back: Gait intact. Injection site clear    IMAGES:     Intra-Op fluoroscopy reviewed with the patient which is consistent with lumbar transforaminal epidural steroid injection    ASSESSMENT AND PLAN:   Lumbar radiculopathy  (primary encounter diagnosis)    The patient is a 50-year-old gentleman with history of lumbar radiculopathy. He is doing very well after lumbar transforaminal epidural steroid injection with significant improvement in pain and function. Discussed continuation of home exercise program.  Patient can follow-up on as-needed basis. Radiology orders and consultations:None  The patient indicates understanding of these issues and agrees to the plan. No follow-ups on file.     Ameena Damon MD, 10/11/2023, 8:38 AM

## 2023-10-11 NOTE — PROGRESS NOTES
Last procedure:   RIGHT LUMBAR 4-5, LUMBAR 5-SACRAL 1 TRANSFORAMINAL EPIDURAL STEROID INJECTION MULTIPLE LEVEL with local     Date: 9/28/23  Percentage of relief obtained: 80%  Duration of relief: Consistent    Current Pain Score: 3-4    Narcotic Contract Exp: NA

## 2023-10-23 ENCOUNTER — OFFICE VISIT (OUTPATIENT)
Dept: FAMILY MEDICINE CLINIC | Facility: CLINIC | Age: 58
End: 2023-10-23
Payer: MEDICAID

## 2023-10-23 VITALS
DIASTOLIC BLOOD PRESSURE: 78 MMHG | HEART RATE: 74 BPM | WEIGHT: 230 LBS | OXYGEN SATURATION: 98 % | SYSTOLIC BLOOD PRESSURE: 136 MMHG | RESPIRATION RATE: 16 BRPM | TEMPERATURE: 98 F | HEIGHT: 74 IN | BODY MASS INDEX: 29.52 KG/M2

## 2023-10-23 DIAGNOSIS — I10 ESSENTIAL HYPERTENSION: Primary | ICD-10-CM

## 2023-10-23 DIAGNOSIS — Z12.11 SCREENING FOR COLON CANCER: ICD-10-CM

## 2023-10-23 DIAGNOSIS — N18.30 STAGE 3 CHRONIC KIDNEY DISEASE, UNSPECIFIED WHETHER STAGE 3A OR 3B CKD (HCC): ICD-10-CM

## 2023-10-23 PROCEDURE — 90686 IIV4 VACC NO PRSV 0.5 ML IM: CPT | Performed by: FAMILY MEDICINE

## 2023-10-23 PROCEDURE — 3008F BODY MASS INDEX DOCD: CPT | Performed by: FAMILY MEDICINE

## 2023-10-23 PROCEDURE — 90471 IMMUNIZATION ADMIN: CPT | Performed by: FAMILY MEDICINE

## 2023-10-23 PROCEDURE — 3078F DIAST BP <80 MM HG: CPT | Performed by: FAMILY MEDICINE

## 2023-10-23 PROCEDURE — 3075F SYST BP GE 130 - 139MM HG: CPT | Performed by: FAMILY MEDICINE

## 2023-10-23 PROCEDURE — 99214 OFFICE O/P EST MOD 30 MIN: CPT | Performed by: FAMILY MEDICINE

## 2023-10-23 NOTE — PROGRESS NOTES
Subjective:   Patient ID: Delaney Ackerman is a 61year old male. HPI  Mr. 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 HENT:  Positive for congestion and sneezing. Negative for sore throat and trouble swallowing. Respiratory:  Positive for cough. Negative for shortness of breath. Cardiovascular:  Negative for chest pain, palpitations and leg swelling. Gastrointestinal:  Negative for abdominal pain, diarrhea, nausea and vomiting. Musculoskeletal:  Positive for arthralgias and back pain. Neurological:  Negative for dizziness. Current Outpatient Medications   Medication Sig Dispense Refill    gabapentin 100 MG Oral Cap Pt will start the drug @ 100 mg nightly  x 5 days, then increase to 200 mg nightly. 60 capsule 1    traMADol 50 MG Oral Tab Take 1 tablet (50 mg total) by mouth every 8 (eight) hours as needed for Pain. 30 tablet 0    hydroCHLOROthiazide 25 MG Oral Tab Take 1 tablet (25 mg total) by mouth daily. 90 tablet 0    Tadalafil (CIALIS) 20 MG Oral Tab Take 1 tablet (20 mg total) by mouth daily as needed for Erectile Dysfunction. 30 tablet 5    PARoxetine 20 MG Oral Tab Take 1 tablet (20 mg total) by mouth every morning. 90 tablet 1    clobetasol 0.05 % External Cream Apply 1 Application topically 2 (two) times daily. 60 g 1    aspirin 325 MG Oral Tab Take 1 tablet (325 mg total) by mouth daily. Multiple Vitamin (ONE-DAILY MULTI VITAMINS) Oral Tab Take 1 tablet by mouth daily. Allergies:No Known Allergies    Objective:   Physical Exam  Vitals reviewed. Constitutional:       General: He is not in acute distress. HENT:      Mouth/Throat:      Mouth: Mucous membranes are moist.      Pharynx: Oropharynx is clear. Eyes:      General: No scleral icterus. Conjunctiva/sclera: Conjunctivae normal.   Cardiovascular:      Rate and Rhythm: Normal rate and regular rhythm. Heart sounds: Normal heart sounds. No murmur heard. Pulmonary:      Effort: Pulmonary effort is normal. No respiratory distress. Breath sounds: Normal breath sounds. No wheezing or rales. Abdominal:      General: Bowel sounds are normal.      Palpations: Abdomen is soft. Musculoskeletal:      Cervical back: Neck supple. Right lower leg: No edema. Left lower leg: No edema. Lymphadenopathy:      Cervical: No cervical adenopathy. Skin:     General: Skin is warm. Neurological:      Mental Status: He is alert. Assessment & Plan:   Essential hypertension  (primary encounter diagnosis)  -Well-controlled  - Continue current medication regimen  Screening for colon cancer  -Agreed to colonoscopy  Stage 3 chronic kidney disease, unspecified whether stage 3a or 3b CKD ([facility]  -Stable  - We will have labs done in the next few months  - Continue follow-up with nephrology    Follow-up after 3 months or as needed      This note was prepared using [employer] voice recognition dictation software. As a result errors may occur. When identified these errors have been corrected.  While every attempt is made to correct errors during dictation discrepancies may still exist          Orders Placed This Encounter      Fluzone Quadrivalent 6mo+ 0.5mL      Meds This Visit:  Requested Prescriptions      No prescriptions requested or ordered in this encounter       Imaging & Referrals:  INFLUENZA VACCINE, QUAD, PRESERVATIVE FREE, 0.5 ML  SURGERY - INTERNAL

## 2023-10-23 NOTE — PATIENT INSTRUCTIONS
Thank you for choosing Lakshmi Barraza MD at [place] [address]  To Do: Juanjo Finders  1. Please take meds as directed. [facility] is located in [address]. Monday, Tuesday & Friday - 8 a.m. to 4 p.m. Wednesday, Thursday - 7 a.m. to 3 p.m. The lab is closed daily from 12 p.m.-12:30 p.m. Saturday lab hours by appointment. Call [phone] to schedule the appointment. Please signup for [employer], which is electronic access to your record if you have not done so. All your results will post on there. [email or web address]   You can NOW use [employer] to book your appointments with us, or consider using open access scheduling which is through the [facility] website [email or web address] and type in Lakshmi Barraza MD and follow the links for \"Schedule Online Now\"    To schedule Imaging or tests at [facility] Scheduling [phone], Go to [facility] A ER Building (For example: CT scans, X rays, Ultrasound, MRI)  Cardiac Testing in ER building Building A second floor Cardiac Testing [phone] (For example: Holter Monitor, Cardiac Stress tests,Event Monitor, or 2D Echocardiograms)  [facility] call [phone] usually in dg A  Walk in Clinic in [place] at [facility]. [address] Mon-Fri at 8am-7:30 p.m., and Sat/Sun 9:00a. m.-4:30 p.m. Also at [address]  Call [phone] for info     Please call our office about any questions regarding your treatment/medicines/tests as a result of today's visit. For your safety, read the entire package insert of all medicines prescribed to you and be aware of all of the risks of treatment even beyond those discussed today. All therapies have potential risk of harm or side effects or medication interactions.   It is your duty and for your safety to discuss with the pharmacist and our office with questions, and to notify us and stop treatment if problems arise, but know that our intention is that the benefits outweigh those potential risks and we strive to make you healthier and to improve your quality of life. Referrals, and Radiology Information:    If your insurance requires a referral to a specialist, please allow 5 business days to process your referral request.    If Lola Adams MD orders a CT or MRI, it may take up to 10 business days to receive approval from your insurance company. Once our office has called informing you that the insurance company approved your testing, please call Central Scheduling at [phone]  Please allow our office 5 business days to contact you regarding any testing results. Refill policies:   Allow 3 business days for refills; controlled substances may take longer and must be picked up from the office in person. Narcotic medications can only be filled in 30 day increments and must be refilled at an office visit only. If your prescription is due for a refill, you may be due for a follow-up appointment. We cannot refill your maintenance medications at a preventative wellness visit. To best provide you care, patients receiving maintenance medications need to be seen at least twice a year.

## 2023-10-30 ENCOUNTER — OFFICE VISIT (OUTPATIENT)
Dept: ORTHOPEDICS CLINIC | Facility: CLINIC | Age: 58
End: 2023-10-30

## 2023-10-30 VITALS — WEIGHT: 230 LBS | HEIGHT: 74 IN | BODY MASS INDEX: 29.52 KG/M2

## 2023-10-30 DIAGNOSIS — M17.11 PRIMARY OSTEOARTHRITIS OF RIGHT KNEE: Primary | ICD-10-CM

## 2023-10-30 PROCEDURE — 3008F BODY MASS INDEX DOCD: CPT | Performed by: PHYSICIAN ASSISTANT

## 2023-10-30 PROCEDURE — 20610 DRAIN/INJ JOINT/BURSA W/O US: CPT | Performed by: PHYSICIAN ASSISTANT

## 2023-10-30 RX ORDER — TRIAMCINOLONE ACETONIDE 40 MG/ML
40 INJECTION, SUSPENSION INTRA-ARTICULAR; INTRAMUSCULAR ONCE
Status: COMPLETED | OUTPATIENT
Start: 2023-10-30 | End: 2023-10-30

## 2023-10-30 RX ADMIN — TRIAMCINOLONE ACETONIDE 40 MG: 40 INJECTION, SUSPENSION INTRA-ARTICULAR; INTRAMUSCULAR at 09:54:00

## 2023-10-30 NOTE — PROCEDURES
Patient had an excellent trip, and the right knee held up well for him. He has started feeling some pain in the left knee. And I recommended that he contact our office if he would like to consider treatment for the left knee, and we can order radiographs. After informed consent, the patient's right knee was marked, locally anesthetized with skin refrigerant, prepped with topical antiseptic, and injected with a mixture of 1mL 40mg/mL Kenalog, 2mL 1% lidocaine and 2mL 0.5% marcaine through the inferolateral portal.  A band-aid was applied. The patient tolerated the procedure well.     NAVYA Reynolds [facility] Orthopedic Surgery

## 2023-11-06 RX ORDER — HYDROCHLOROTHIAZIDE 25 MG/1
25 TABLET ORAL DAILY
Qty: 90 TABLET | Refills: 0 | Status: SHIPPED | OUTPATIENT
Start: 2023-11-06

## 2023-11-07 DIAGNOSIS — F41.1 GAD (GENERALIZED ANXIETY DISORDER): ICD-10-CM

## 2023-11-08 ENCOUNTER — OFFICE VISIT (OUTPATIENT)
Facility: LOCATION | Age: 58
End: 2023-11-08
Payer: MEDICAID

## 2023-11-08 VITALS — TEMPERATURE: 97 F | HEART RATE: 87 BPM

## 2023-11-08 DIAGNOSIS — Z12.11 COLON CANCER SCREENING: Primary | ICD-10-CM

## 2023-11-08 PROCEDURE — S0285 CNSLT BEFORE SCREEN COLONOSC: HCPCS | Performed by: STUDENT IN AN ORGANIZED HEALTH CARE EDUCATION/TRAINING PROGRAM

## 2023-11-08 RX ORDER — POLYETHYLENE GLYCOL 3350, SODIUM CHLORIDE, SODIUM BICARBONATE, POTASSIUM CHLORIDE 420; 11.2; 5.72; 1.48 G/4L; G/4L; G/4L; G/4L
POWDER, FOR SOLUTION ORAL
Qty: 1 EACH | Refills: 0 | Status: SHIPPED | OUTPATIENT
Start: 2023-11-08

## 2023-11-09 RX ORDER — PAROXETINE HYDROCHLORIDE 20 MG/1
20 TABLET, FILM COATED ORAL EVERY MORNING
Qty: 90 TABLET | Refills: 1 | Status: SHIPPED | OUTPATIENT
Start: 2023-11-09

## 2023-11-09 NOTE — TELEPHONE ENCOUNTER
Requesting Paroxetine 20mg  LOV: 10/23/23  RTC: 3 months  Last Relevant Labs: 7/26/23  Filled: 9/12/22 #90 with 1 refills    Future Appointments   Date Time Provider Rony Olivas   1/23/2024 10:30 AM Stacie Neves MD [facility] [address] [facility] [address]   10/1/2024  2:00 PM Radha Mckeon MD [id number] [address]     Non-protocol med:  Rx pended and routed for approval/denial

## 2023-12-01 ENCOUNTER — TELEPHONE (OUTPATIENT)
Facility: LOCATION | Age: 58
End: 2023-12-01

## 2023-12-01 DIAGNOSIS — Z12.11 SCREENING FOR COLON CANCER: Primary | ICD-10-CM

## 2023-12-04 RX ORDER — GABAPENTIN 100 MG/1
CAPSULE ORAL
Qty: 60 CAPSULE | Refills: 1 | Status: SHIPPED | OUTPATIENT
Start: 2023-12-04

## 2024-01-21 ENCOUNTER — HOSPITAL ENCOUNTER (EMERGENCY)
Age: 59
Discharge: HOME | End: 2024-01-21
Payer: MEDICAID

## 2024-01-21 ENCOUNTER — APPOINTMENT (OUTPATIENT)
Dept: GENERAL RADIOLOGY | Age: 59
End: 2024-01-21
Attending: PHYSICIAN ASSISTANT
Payer: MEDICAID

## 2024-01-21 VITALS
DIASTOLIC BLOOD PRESSURE: 94 MMHG | SYSTOLIC BLOOD PRESSURE: 141 MMHG | BODY MASS INDEX: 27.21 KG/M2 | HEART RATE: 95 BPM | TEMPERATURE: 98 F | OXYGEN SATURATION: 96 % | WEIGHT: 212 LBS | RESPIRATION RATE: 17 BRPM | HEIGHT: 74 IN

## 2024-01-21 DIAGNOSIS — M54.12 CERVICAL RADICULOPATHY: Primary | ICD-10-CM

## 2024-01-21 DIAGNOSIS — S63.612A SPRAIN OF RIGHT MIDDLE FINGER, UNSPECIFIED SITE OF DIGIT, INITIAL ENCOUNTER: ICD-10-CM

## 2024-01-21 PROCEDURE — 73140 X-RAY EXAM OF FINGER(S): CPT | Performed by: PHYSICIAN ASSISTANT

## 2024-01-21 PROCEDURE — 99284 EMERGENCY DEPT VISIT MOD MDM: CPT

## 2024-01-21 PROCEDURE — 73030 X-RAY EXAM OF SHOULDER: CPT | Performed by: PHYSICIAN ASSISTANT

## 2024-01-21 PROCEDURE — 72050 X-RAY EXAM NECK SPINE 4/5VWS: CPT | Performed by: PHYSICIAN ASSISTANT

## 2024-01-21 RX ORDER — CYCLOBENZAPRINE HCL 5 MG
TABLET ORAL 3 TIMES DAILY PRN
Qty: 20 TABLET | Refills: 0 | Status: SHIPPED | OUTPATIENT
Start: 2024-01-21

## 2024-01-21 RX ORDER — METHYLPREDNISOLONE 4 MG/1
TABLET ORAL
Qty: 1 EACH | Refills: 0 | Status: SHIPPED | OUTPATIENT
Start: 2024-01-21

## 2024-01-21 NOTE — ED INITIAL ASSESSMENT (HPI)
Patient presents with pain and stiffness to right third finger and right shoulder after attempting to open the car door.

## 2024-01-22 NOTE — DISCHARGE INSTRUCTIONS
Neurologic Instructions    Call your doctor if you have:  increased pain or headache.   trouble seeing, walking or using your arms or legs.   dizziness or passing out.   any change in behavior (agitated or sleepy).   trouble being awakened from sleep.   numbness in your face, arm or leg.   extreme weakness.   trouble talking.   nausea and vomiting.   any new or severe symptoms.    Take your medicines as prescribed. Most important, see a doctor again as discussed. If you have problems that we have not discussed, call or visit your doctor right away. If you cannot reach your doctor, return to the Emergency Department.     Please return to the Emergency department/clinic if symptoms worsen or you develop new symptoms.  Follow up with your primary care physician in 2 days. Take any medications prescribed to you as instructed.

## 2024-01-22 NOTE — ED PROVIDER NOTES
Patient Seen in: [facility] [place]      History     Chief Complaint   Patient presents with    Finger Injury    Shoulder Pain     Stated Complaint: right middle finger and right shoulder injury    Subjective:   HPI    Bill is a 58-year-old male who presents today for evaluation of right upper extremity pain.  He has a past medical history of hypertension and single kidney.  He states that 2 days ago, he was pulling open his car's trunk and suddenly had pain to the right third digit and right shoulder.  States that the pain seems to travel down the arm.  Pain is worse with range of motion.  He denies open wounds, history of radiculopathy and paresthesias.    Objective:   Past Medical History:   Diagnosis Date    Carcinoid tumor of kidney     Essential hypertension               Past Surgical History:   Procedure Laterality Date    NEPHRECTOMY Right 11/2020                Social History     Socioeconomic History    Marital status:    Tobacco Use    Smoking status: Never    Smokeless tobacco: Never   Vaping Use    Vaping Use: Never used   Substance and Sexual Activity    Alcohol use: Never    Drug use: Never   Other Topics Concern    Caffeine Concern Yes     Comment: tea daily    Exercise Yes     Comment: PT at home              Review of Systems    Positive for stated complaint: right middle finger and right shoulder injury  Other systems are as noted in HPI.  Constitutional and vital signs reviewed.      All other systems reviewed and negative except as noted above.    Physical Exam     ED Triage Vitals [01/21/24 1748]   /84   Pulse 113   Resp 18   Temp 98 °F (36.7 °C)   Temp src Temporal   SpO2 98 %   O2 Device None (Room air)       Current:BP (!) 141/94   Pulse 95   Temp 97.9 °F (36.6 °C) (Temporal)   Resp 17   Ht 188 cm (6' 2\")   Wt 96.2 kg   SpO2 96%   BMI 27.22 kg/m²         Physical Exam    Nursing note reviewed. Vital signs reviewed.  General: A&O x 3;  well-developed; well-nourished; no acute distress  Head: Normocephalic, atraumatic  Neck: Normal alignment.  Tender to palpation along the right trapezius from the occiput toward the shoulder.  Chest: Nontender, normal expansion  Cardio: RRR, no murmurs/clicks/rubs, capillary refill brisk, normal distal pulses  Pulmonary: Normal respirations, lungs CTA  Musculoskeletal: Right shoulder is normal in appearance.  Tender to palpation posteriorly.  Pain with abduction.  No pain with internal or external rotation.  Neurological: CN III - XII grossly intact, full strength and sensation in BL extremities        ED Course   Labs Reviewed - No data to display  Imaging ordered and independently visualized and interpreted by myself, along with review of radiologist's interpretation and noted the following: Degenerative changes in the cervical spine.  Finger and shoulder x-rays without acute fractures.  Radiology read:  XR FINGER(S) (MIN 2 VIEWS), RIGHT 3RD (CPT=73140)  CONCLUSION:  Minimal osteoarthritic changes are present. No acute fracture or other acute bony process.  Tissue swelling is present.   LOCATION:  [facility]   Dictated by (CST): Nahum Santana MD on 1/21/2024 at 7:16 PM     Finalized by (CST): Nahum Santana MD on 1/21/2024 at 7:17 PM       XR SHOULDER, COMPLETE (MIN 2 VIEWS), RIGHT (CPT=73030)  CONCLUSION:  Mild osteoarthritic changes are present. No acute fracture or other acute bony process.   LOCATION:  [facility]   Dictated by (CST): Nahum Santana MD on 1/21/2024 at 7:16 PM     Finalized by (CST): Nahum Santana MD on 1/21/2024 at 7:16 PM       XR CERVICAL SPINE (4VIEWS) (CPT=72050)  CONCLUSION:    No fracture or subluxation.  Degenerative disc changes are present moderate.  Normal alignment.  Neural foraminal stenosis present moderate-severe on the right at C6-7, and moderate on the right at C5-6, mild-moderate on the left at C6-7.  No scoliosis.  LOCATION:  [facility]   Dictated by (CST): Nahum Snatana MD on  1/21/2024 at 7:14 PM     Finalized by (CST): Nahum Santana MD on 1/21/2024 at 7:16 PM        Patient is informed of his imaging findings.  He cannot take NSAIDs, so we will try a round of steroids and muscle relaxers.  He is warned of the common side effects these medications.  He expresses understanding and agrees with the plan.               [facility]             Medical Decision Making  Patient is a 58-year-old male who presents today for evaluation of right upper extremity pain after pulling open his car trunk 2 days ago.  Differential diagnosis includes, but is not limited to cervical radiculopathy, shoulder sprain, finger sprain, loss of function, and other potentially life-threatening processes.  X-rays of the neck do show degenerative changes consistent with cervical radiculopathy.  Shoulder x-ray shows arthritis, finger x-ray with soft tissue swelling.  Patient is placed in a finger splint for comfort.  He is instructed to follow-up with neurosurgery/pain.  Medrol Dosepak and Flexeril are prescribed.  He expresses understanding and agrees with the plan.    Amount and/or Complexity of Data Reviewed  Radiology: ordered and independent interpretation performed. Decision-making details documented in ED Course.    Risk  Prescription drug management.        Disposition and Plan     Clinical Impression:  1. Cervical radiculopathy    2. Sprain of right middle finger, unspecified site of digit, initial encounter         Disposition:  Discharge  1/21/2024  7:41 pm    Follow-up:  [facility], [address] [place] [address] [place] [id number] [phone]  Call  choose option 2 for neurosurgery or pain follow up          Medications Prescribed:  Discharge Medication List as of 1/21/2024  7:43 PM        START taking these medications    Details   methylPREDNISolone (MEDROL) 4 MG Oral Tablet Therapy Pack Dosepack: take as directed, Normal, Disp-1 each, R-0       cyclobenzaprine 5 MG Oral Tab Take 1-2 tablets (5-10 mg total) by mouth 3 (three) times daily as needed for Muscle spasms., Normal, Disp-20 tablet, R-0

## 2024-01-23 ENCOUNTER — TELEMEDICINE (OUTPATIENT)
Dept: FAMILY MEDICINE CLINIC | Facility: CLINIC | Age: 59
End: 2024-01-23
Payer: MEDICAID

## 2024-01-23 DIAGNOSIS — M54.12 CERVICAL RADICULOPATHY: Primary | ICD-10-CM

## 2024-01-23 PROCEDURE — 99214 OFFICE O/P EST MOD 30 MIN: CPT | Performed by: FAMILY MEDICINE

## 2024-01-23 NOTE — PROGRESS NOTES
Subjective:   Patient ID: Bill Raygoza is a 58 year old male.    HPI  Mr. Raygoza is a pleasant 58-year-old gentleman presenting for video visit follow-up after he was seen at the emergency room previously for neck pain.  Localizes the pain on the right side radiating to his right shoulder.  He does not report numbness tingling or weakness.  Pain is constant and consistently.  He did have an x-ray of the cervical spine which showed degenerative changes and neuroforaminal stenosis of C5-C6 and C6/C7.  He has been prescribed steroids and Flexeril but has not used this yet.  He denies recent injury or trauma.  This has been ongoing for 1 week. I had reviewed past medical and family histories together with allergy and medication lists documented.    History/Other:   Review of Systems   Constitutional:  Negative for fever.   Respiratory:  Negative for cough.    Cardiovascular:  Negative for chest pain.   Gastrointestinal:  Negative for abdominal pain.   Neurological:  Negative for dizziness, weakness, numbness and headaches.     Current Outpatient Medications   Medication Sig Dispense Refill    methylPREDNISolone (MEDROL) 4 MG Oral Tablet Therapy Pack Dosepack: take as directed 1 each 0    cyclobenzaprine 5 MG Oral Tab Take 1-2 tablets (5-10 mg total) by mouth 3 (three) times daily as needed for Muscle spasms. 20 tablet 0    gabapentin 100 MG Oral Cap TAKE 1 CAPSULE BY MOUTH NIGHTLY FOR 5 DAYS THEN TAKE 2 CAPSULES BY MOUTH NIGHTLY 60 capsule 1    PARoxetine 20 MG Oral Tab Take 1 tablet (20 mg total) by mouth every morning. 90 tablet 1    PEG 3350-KCl-Na Bicarb-NaCl (TRILYTE) 420 g Oral Recon Soln Starting at 4:00 pm the night before procedure, drink 8 ounces of the prep every 15-20 minutes until finished 1 each 0    hydroCHLOROthiazide 25 MG Oral Tab Take 1 tablet (25 mg total) by mouth daily. 90 tablet 0    traMADol 50 MG Oral Tab Take 1 tablet (50 mg total) by mouth every 8 (eight) hours as needed for Pain. 30 tablet 0     Tadalafil (CIALIS) 20 MG Oral Tab Take 1 tablet (20 mg total) by mouth daily as needed for Erectile Dysfunction. 30 tablet 5    clobetasol 0.05 % External Cream Apply 1 Application topically 2 (two) times daily. 60 g 1    aspirin 325 MG Oral Tab Take 1 tablet (325 mg total) by mouth daily.      Multiple Vitamin (ONE-DAILY MULTI VITAMINS) Oral Tab Take 1 tablet by mouth daily.       Allergies:No Known Allergies    Objective:   Physical Exam  Constitutional:       General: He is not in acute distress.  Neurological:      Mental Status: He is alert.         Assessment & Plan:   1. Cervical radiculopathy    -Agree with trial of steroids  - Will refer to physical therapy if with no considerable improvement after steroid use  - Will order MRI of the cervical spine if with no considerable improvement with medication management and physical therapy  -Call, symptoms worsen or persist    This note was prepared using Dragon Medical voice recognition dictation software. As a result errors may occur. When identified these errors have been corrected. While every attempt is made to correct errors during dictation discrepancies may still exist            No orders of the defined types were placed in this encounter.      Meds This Visit:  Requested Prescriptions      No prescriptions requested or ordered in this encounter       Imaging & Referrals:  OP REFERRAL TO [facility]  MRI SPINE CERVICAL (CPT=72141)

## 2024-02-03 RX ORDER — HYDROCHLOROTHIAZIDE 25 MG/1
25 TABLET ORAL DAILY
Qty: 90 TABLET | Refills: 0 | Status: SHIPPED | OUTPATIENT
Start: 2024-02-03

## 2024-02-07 ENCOUNTER — TELEPHONE (OUTPATIENT)
Dept: PHYSICAL THERAPY | Facility: HOSPITAL | Age: 59
End: 2024-02-07

## 2024-02-07 ENCOUNTER — APPOINTMENT (OUTPATIENT)
Dept: PHYSICAL THERAPY | Age: 59
End: 2024-02-07
Attending: FAMILY MEDICINE
Payer: MEDICAID

## 2024-02-09 ENCOUNTER — APPOINTMENT (OUTPATIENT)
Dept: PHYSICAL THERAPY | Age: 59
End: 2024-02-09
Attending: FAMILY MEDICINE
Payer: MEDICAID

## 2024-02-16 ENCOUNTER — APPOINTMENT (OUTPATIENT)
Dept: PHYSICAL THERAPY | Age: 59
End: 2024-02-16
Attending: FAMILY MEDICINE
Payer: MEDICAID

## 2024-02-19 ENCOUNTER — APPOINTMENT (OUTPATIENT)
Dept: PHYSICAL THERAPY | Age: 59
End: 2024-02-19
Attending: FAMILY MEDICINE
Payer: MEDICAID

## 2024-02-23 ENCOUNTER — APPOINTMENT (OUTPATIENT)
Dept: PHYSICAL THERAPY | Age: 59
End: 2024-02-23
Attending: FAMILY MEDICINE
Payer: MEDICAID

## 2024-03-07 DIAGNOSIS — N52.9 ERECTILE DYSFUNCTION, UNSPECIFIED ERECTILE DYSFUNCTION TYPE: ICD-10-CM

## 2024-03-07 DIAGNOSIS — F41.1 GAD (GENERALIZED ANXIETY DISORDER): ICD-10-CM

## 2024-03-07 RX ORDER — PAROXETINE HYDROCHLORIDE 20 MG/1
20 TABLET, FILM COATED ORAL EVERY MORNING
Qty: 90 TABLET | Refills: 0 | Status: SHIPPED | OUTPATIENT
Start: 2024-03-07

## 2024-03-07 RX ORDER — HYDROCHLOROTHIAZIDE 25 MG/1
25 TABLET ORAL DAILY
Qty: 90 TABLET | Refills: 0 | Status: SHIPPED | OUTPATIENT
Start: 2024-03-07

## 2024-03-07 NOTE — TELEPHONE ENCOUNTER
Psychiatric Non-Scheduled (Anti-Anxiety) [id number]/07/2024 09:57 AM   Protocol Details In person appointment or virtual visit in the past 6 mos or appointment in next 3 mos    Depression Screening completed within the past 12 months        Rx sent per protocol

## 2024-03-15 RX ORDER — TADALAFIL 20 MG/1
20 TABLET ORAL
Qty: 30 TABLET | Refills: 1 | Status: SHIPPED | OUTPATIENT
Start: 2024-03-15

## 2024-04-05 ENCOUNTER — OFFICE VISIT (OUTPATIENT)
Dept: ORTHOPEDICS CLINIC | Facility: CLINIC | Age: 59
End: 2024-04-05
Payer: MEDICAID

## 2024-04-05 VITALS — HEIGHT: 74 IN | WEIGHT: 212 LBS | BODY MASS INDEX: 27.21 KG/M2

## 2024-04-05 DIAGNOSIS — M17.11 PRIMARY OSTEOARTHRITIS OF RIGHT KNEE: Primary | ICD-10-CM

## 2024-04-05 PROCEDURE — 20610 DRAIN/INJ JOINT/BURSA W/O US: CPT | Performed by: PHYSICIAN ASSISTANT

## 2024-04-05 RX ORDER — TRIAMCINOLONE ACETONIDE 40 MG/ML
40 INJECTION, SUSPENSION INTRA-ARTICULAR; INTRAMUSCULAR ONCE
Status: COMPLETED | OUTPATIENT
Start: 2024-04-05 | End: 2024-04-05

## 2024-04-05 RX ADMIN — TRIAMCINOLONE ACETONIDE 40 MG: 40 INJECTION, SUSPENSION INTRA-ARTICULAR; INTRAMUSCULAR at 08:27:00

## 2024-04-05 NOTE — PROCEDURES
Patient also reports left knee pain which onset about 1 month ago.  I discussed performing a right knee injection in clinic today and seeing if this helps improve the left knee pain.  If no improvement, would recommend follow-up with radiographs of the left knee for evaluation.    After informed consent, the patient's right knee was marked, locally anesthetized with skin refrigerant, prepped with topical antiseptic, and injected with a mixture of 1mL 40mg/mL Kenalog, 2mL 1% lidocaine and 2mL 0.5% marcaine through the inferolateral portal.  A band-aid was applied.  The patient tolerated the procedure well.    Chuck Stevens PA-C  [facility]

## 2024-05-09 ENCOUNTER — OFFICE VISIT (OUTPATIENT)
Dept: FAMILY MEDICINE CLINIC | Facility: CLINIC | Age: 59
End: 2024-05-09
Payer: MEDICAID

## 2024-05-09 VITALS
OXYGEN SATURATION: 98 % | TEMPERATURE: 98 F | SYSTOLIC BLOOD PRESSURE: 134 MMHG | DIASTOLIC BLOOD PRESSURE: 80 MMHG | HEART RATE: 74 BPM | WEIGHT: 243 LBS | RESPIRATION RATE: 16 BRPM | BODY MASS INDEX: 31.18 KG/M2 | HEIGHT: 74 IN

## 2024-05-09 DIAGNOSIS — D17.1 LIPOMA OF BACK: Primary | ICD-10-CM

## 2024-05-09 PROCEDURE — 99214 OFFICE O/P EST MOD 30 MIN: CPT | Performed by: FAMILY MEDICINE

## 2024-05-09 NOTE — PROGRESS NOTES
Subjective:   Patient ID: Bill Raygoza is a 58 year old male.    HPI  Mr. 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  Otherwise no fever no fatigue no cough no chest pain no shortness of breath, no nausea no vomiting no abdominal pain no weight loss no change in bladder or bowel habits. I had reviewed past medical and family histories together with allergy and medication lists documented.    History/Other:   Review of Systems   Constitutional:  Negative for fatigue, fever and unexpected weight change.   Respiratory:  Negative for cough and shortness of breath.    Cardiovascular:  Negative for chest pain and palpitations.   Gastrointestinal:  Negative for abdominal pain, constipation, diarrhea, nausea and vomiting.   Genitourinary:  Negative for difficulty urinating and hematuria.   Neurological:  Negative for weakness and numbness.     Current Outpatient Medications   Medication Sig Dispense Refill    Tadalafil (CIALIS) 20 MG Oral Tab Take 1 tablet (20 mg total) by mouth daily as needed for Erectile Dysfunction. 30 tablet 1    hydroCHLOROthiazide 25 MG Oral Tab Take 1 tablet (25 mg total) by mouth daily. 90 tablet 0    PARoxetine 20 MG Oral Tab Take 1 tablet (20 mg total) by mouth every morning. 90 tablet 0    cyclobenzaprine 5 MG Oral Tab Take 1-2 tablets (5-10 mg total) by mouth 3 (three) times daily as needed for Muscle spasms. 20 tablet 0    gabapentin 100 MG Oral Cap TAKE 1 CAPSULE BY MOUTH NIGHTLY FOR 5 DAYS THEN TAKE 2 CAPSULES BY MOUTH NIGHTLY 60 capsule 1    PEG 3350-KCl-Na Bicarb-NaCl (TRILYTE) 420 g Oral Recon Soln Starting at 4:00 pm the night before procedure, drink 8 ounces of the prep every 15-20 minutes  until finished 1 each 0    traMADol 50 MG Oral Tab Take 1 tablet (50 mg total) by mouth every 8 (eight) hours as needed for Pain. 30 tablet 0    clobetasol 0.05 % External Cream Apply 1 Application topically 2 (two) times daily. 60 g 1    aspirin 325 MG Oral Tab Take 1 tablet (325 mg total) by mouth daily.      Multiple Vitamin (ONE-DAILY MULTI VITAMINS) Oral Tab Take 1 tablet by mouth daily.       Allergies:No Known Allergies    Objective:   Physical Exam  Vitals reviewed.   Constitutional:       General: He is not in acute distress.  HENT:      Mouth/Throat:      Mouth: Mucous membranes are moist.      Pharynx: Oropharynx is clear.   Eyes:      General: No scleral icterus.     Conjunctiva/sclera: Conjunctivae normal.   Cardiovascular:      Rate and Rhythm: Normal rate and regular rhythm.      Heart sounds: Normal heart sounds. No murmur heard.  Pulmonary:      Effort: Pulmonary effort is normal. No respiratory distress.      Breath sounds: Normal breath sounds. No wheezing or rales.   Abdominal:      General: Bowel sounds are normal.      Palpations: Abdomen is soft.   Musculoskeletal:      Cervical back: Neck supple.      Right lower leg: No edema.      Left lower leg: No edema.   Lymphadenopathy:      Cervical: No cervical adenopathy.   Skin:     General: Skin is warm.             Comments: Palpable soft mass noted on the left thoracolumbar region, lateral aspect which was movable measuring approximately 2 inches in size.  No erythema no warmth no tenderness no fluctuance.   Neurological:      Mental Status: He is alert.   Psychiatric:         Mood and Affect: Mood normal.         Behavior: Behavior normal.         Assessment & Plan:   1. Lipoma of back    -I assured him that this is benign in nature but this has been giving him some pain  - I will refer to general surgery for further evaluation management    Follow-up as scheduled or as needed    This note was prepared using Dragon Medical voice recognition  dictation software. As a result errors may occur. When identified these errors have been corrected. While every attempt is made to correct errors during dictation discrepancies may still exist            No orders of the defined types were placed in this encounter.      Meds This Visit:  Requested Prescriptions      No prescriptions requested or ordered in this encounter       Imaging & Referrals:  SURGERY - INTERNAL

## 2024-05-09 NOTE — PATIENT INSTRUCTIONS
Thank you for choosing Michael Crabtree MD at [facility]  To Do: Bill Raygoza  1. Please see below   Call [phone] to schedule the appointment.   Please signup for [facility], which is electronic access to your record if you have not done so.  All your results will post on there.  [email or web address]   You can NOW use [employer] to book your appointments with us, or consider using open access scheduling which is through the [facility] website [email or web address] and type in Michael Crabtree MD and follow the links for \"Schedule Online Now\"    To schedule Imaging or tests at [facility] call Central Scheduling [phone], Go to [facility] A ER Building (For example: CT scans, X rays, Ultrasound, MRI)  Cardiac Testing in ER building Building A second floor Cardiac Testing [phone] (For example: Holter Monitor, Cardiac Stress tests,Event Monitor, or 2D Echocardiograms)  [facility] call [phone] usually in [facility] A  Walk in Clinic in [place] at [address] Mon-Fri at 8am-7:30 p.m., and Sat/Sun 9:00a.m.-4:30 p.m.  Also at [address] [place] [address]  Call [phone] for info     Please call our office about any questions regarding your treatment/medicines/tests as a result of today's visit.  For your safety, read the entire package insert of all medicines prescribed to you and be aware of all of the risks of treatment even beyond those discussed today.  All therapies have potential risk of harm or side effects or medication interactions.  It is your duty and for your safety to discuss with the pharmacist and our office with questions, and to notify us and stop treatment if problems arise, but know that our intention is that the benefits outweigh those potential risks and we strive to make you healthier and to improve your quality of life.    Referrals, and Radiology Information:    If your insurance requires a referral to a specialist, please allow 5 business days to process your  referral request.    If Michael Crabtree MD orders a CT or MRI, it may take up to 10 business days to receive approval from your insurance company. Once our office has called informing you that the insurance company approved your testing, please call Central Scheduling at [phone]  Please allow our office 5 business days to contact you regarding any testing results.    Refill policies:   Allow 3 business days for refills; controlled substances may take longer and must be picked up from the office in person.  Narcotic medications can only be filled in 30 day increments and must be refilled at an office visit only.  If your prescription is due for a refill, you may be due for a follow-up appointment.  We cannot refill your maintenance medications at a preventative wellness visit.  To best provide you care, patients receiving maintenance medications need to be seen at least twice a year.

## 2024-05-15 ENCOUNTER — TELEPHONE (OUTPATIENT)
Facility: LOCATION | Age: 59
End: 2024-05-15

## 2024-05-15 DIAGNOSIS — Z12.11 COLON CANCER SCREENING: Primary | ICD-10-CM

## 2024-05-20 ENCOUNTER — TELEPHONE (OUTPATIENT)
Facility: LOCATION | Age: 59
End: 2024-05-20

## 2024-05-20 NOTE — TELEPHONE ENCOUNTER
JOSE PIEDRA Patient  Member ID  [id number]    Date of Birth  1965-12-21    Gender  Male    Transaction Type  Outpatient Authorization    Organization  [facility]    Payer  [facility] logo     Certificate Information  Reference Number  [id number]    Status  NO ACTION REQUIRED    Message  Requested Service does not require preauthorization. We would strongly encourage you to check benefits for this service.    Member Information  Patient Name  JOSE PIEDRA    Patient Date of Birth  1965-12-21    Patient Gender  Male    Member ID  [id number]    Relationship to Subscriber  Self    Subscriber Name  JOSE PIEDRA    Requesting Provider     Name  PATEL, EDUARDO    NPI  [id number]    Tax Id  [id number]    Specialty  [id number]  Provider Role  Provider    Address  [address] [place] [address]    Phone  [phone]  Fax  [phone]    Contact Name  ANAI JARAMILLO    Service Information  Service Type  2 - Surgical    Place of Service  22 - [facility]    Service From - To Date  2024-06-04 - 2024-08-28    Level of Service  Elective    Diagnosis Code 1   - Encounter for screening for malignant neoplasm of colon    Procedure Code 1 (CPT/HCPCS)  89133 - DIAGNOSTIC COLONOSCOPY    Quantity  1 Units    Status  NO ACTION REQUIRED

## 2024-05-22 ENCOUNTER — OFFICE VISIT (OUTPATIENT)
Facility: LOCATION | Age: 59
End: 2024-05-22

## 2024-05-22 VITALS — HEART RATE: 77 BPM | TEMPERATURE: 97 F

## 2024-05-22 DIAGNOSIS — D17.1 LIPOMA OF TORSO: Primary | ICD-10-CM

## 2024-05-22 NOTE — H&P
Patient ID: Bill Raygoza is a 58 year old male.    Chief Complaint   Patient presents with    [facility]     EP - Lipoma of back area, no symptoms.       HPI: Bill Raygoza is a 58 year old male ***    Workup: ***  @lab4w[WBC:1,BILT:1,AST:1,ALT:1,ALKPHO:1]@      Past Medical History  Past Medical History:    Carcinoid tumor of kidney (HCC)    Essential hypertension       Past Surgical History  Past Surgical History:   Procedure Laterality Date    Nephrectomy Right 11/2020       Medications  Current Outpatient Medications   Medication Sig Dispense Refill    Tadalafil (CIALIS) 20 MG Oral Tab Take 1 tablet (20 mg total) by mouth daily as needed for Erectile Dysfunction. 30 tablet 1    PARoxetine 20 MG Oral Tab Take 1 tablet (20 mg total) by mouth every morning. 90 tablet 0    PEG 3350-KCl-Na Bicarb-NaCl (TRILYTE) 420 g Oral Recon Soln Starting at 4:00 pm the night before procedure, drink 8 ounces of the prep every 15-20 minutes until finished 1 each 0    clobetasol 0.05 % External Cream Apply 1 Application topically 2 (two) times daily. 60 g 1    aspirin 325 MG Oral Tab Take 1 tablet (325 mg total) by mouth daily.      Multiple Vitamin (ONE-DAILY MULTI VITAMINS) Oral Tab Take 1 tablet by mouth daily.         Allergies  No Known Allergies    Social History  History   Smoking Status    Never   Smokeless Tobacco    Never     History   Alcohol Use Never     History   Drug Use Unknown       Family History  Family History   Problem Relation Age of Onset    Cancer Father         prostate    Heart Disease Father     Diabetes Mother     Arthritis Mother        Review of Systems  Review of Systems   Constitutional: Negative.    Respiratory: Negative.     Cardiovascular: Negative.    Gastrointestinal: Negative.        Exam  Vitals:    05/22/24 1348   Pulse: 77   Temp: 97.3 °F (36.3 °C)     Physical Exam  Constitutional:       Appearance: Normal appearance.   Cardiovascular:      Rate and Rhythm: Normal rate.   Pulmonary:       Effort: Pulmonary effort is normal.   Musculoskeletal:         General: Normal range of motion.   Skin:     General: Skin is warm and dry.          Neurological:      Mental Status: He is alert and oriented to person, place, and time.           Assessment/Plan  Assessment   Problem List Items Addressed This Visit          Hematology and Neoplasia    Lipoma of torso - Primary       Bill Raygoza is a 58 year old male ***      Windy Nguyen MD  General Surgery  [facility]     CC: *** Michael Crabtree MD

## 2024-06-04 ENCOUNTER — ANESTHESIA (OUTPATIENT)
Dept: ENDOSCOPY | Facility: HOSPITAL | Age: 59
End: 2024-06-04
Payer: MEDICAID

## 2024-06-04 ENCOUNTER — HOSPITAL ENCOUNTER (OUTPATIENT)
Facility: HOSPITAL | Age: 59
Setting detail: HOSPITAL OUTPATIENT SURGERY
Discharge: HOME | End: 2024-06-04
Attending: STUDENT IN AN ORGANIZED HEALTH CARE EDUCATION/TRAINING PROGRAM | Admitting: STUDENT IN AN ORGANIZED HEALTH CARE EDUCATION/TRAINING PROGRAM
Payer: MEDICAID

## 2024-06-04 ENCOUNTER — ANESTHESIA EVENT (OUTPATIENT)
Dept: ENDOSCOPY | Facility: HOSPITAL | Age: 59
End: 2024-06-04
Payer: MEDICAID

## 2024-06-04 VITALS
OXYGEN SATURATION: 98 % | HEIGHT: 74 IN | RESPIRATION RATE: 16 BRPM | SYSTOLIC BLOOD PRESSURE: 117 MMHG | DIASTOLIC BLOOD PRESSURE: 80 MMHG | BODY MASS INDEX: 30.8 KG/M2 | TEMPERATURE: 98 F | HEART RATE: 55 BPM | WEIGHT: 240 LBS

## 2024-06-04 DIAGNOSIS — Z12.11 COLON CANCER SCREENING: ICD-10-CM

## 2024-06-04 PROCEDURE — 88305 TISSUE EXAM BY PATHOLOGIST: CPT | Performed by: STUDENT IN AN ORGANIZED HEALTH CARE EDUCATION/TRAINING PROGRAM

## 2024-06-04 PROCEDURE — 0DBP8ZX EXCISION OF RECTUM, VIA NATURAL OR ARTIFICIAL OPENING ENDOSCOPIC, DIAGNOSTIC: ICD-10-PCS | Performed by: STUDENT IN AN ORGANIZED HEALTH CARE EDUCATION/TRAINING PROGRAM

## 2024-06-04 RX ORDER — ONDANSETRON 2 MG/ML
4 INJECTION INTRAMUSCULAR; INTRAVENOUS ONCE AS NEEDED
Status: DISCONTINUED | OUTPATIENT
Start: 2024-06-04 | End: 2024-06-04

## 2024-06-04 RX ORDER — SODIUM CHLORIDE, SODIUM LACTATE, POTASSIUM CHLORIDE, CALCIUM CHLORIDE 600; 310; 30; 20 MG/100ML; MG/100ML; MG/100ML; MG/100ML
INJECTION, SOLUTION INTRAVENOUS CONTINUOUS
Status: DISCONTINUED | OUTPATIENT
Start: 2024-06-04 | End: 2024-06-04

## 2024-06-04 RX ORDER — NALOXONE HYDROCHLORIDE 0.4 MG/ML
0.08 INJECTION, SOLUTION INTRAMUSCULAR; INTRAVENOUS; SUBCUTANEOUS ONCE AS NEEDED
Status: DISCONTINUED | OUTPATIENT
Start: 2024-06-04 | End: 2024-06-04

## 2024-06-04 RX ORDER — PHENYLEPHRINE HCL 10 MG/ML
VIAL (ML) INJECTION AS NEEDED
Status: DISCONTINUED | OUTPATIENT
Start: 2024-06-04 | End: 2024-06-04 | Stop reason: SURG

## 2024-06-04 RX ADMIN — SODIUM CHLORIDE, SODIUM LACTATE, POTASSIUM CHLORIDE, CALCIUM CHLORIDE: 600; 310; 30; 20 INJECTION, SOLUTION INTRAVENOUS at 10:47:00

## 2024-06-04 RX ADMIN — PHENYLEPHRINE HCL 100 MCG: 10 MG/ML VIAL (ML) INJECTION at 11:24:00

## 2024-06-04 NOTE — OPERATIVE REPORT
[facility]  Operative Note    Bill Raygoza Location: OR   CSN [id number] MRN [id number]    1965 Age 58 year old   Admission Date 2024 Operation Date 2024   Attending Physician Windy Nguyen MD Operating Physician Windy Nguyen MD   PCP Michael Crabtree MD          Patient Name: Bill Raygoza    Preoperative Diagnosis: Colon cancer screening [Z12.11]    Postoperative Diagnosis:   Right-sided diverticulosis  Rectal polyp    Primary Surgeon: Windy Nguyen MD    Anesthesia: MAC    Procedures: Colonoscopy to the cecum with cold forcep polypectomy    Specimen:   Rectal polyp    Estimated Blood Loss: 1    Complications: None immediate    Condition: Good    Indications for Surgery:   Bill Raygoza is a 58 year old male who presented to the outpatient setting for colon cancer screening.  Patient had never had a colonoscopy.  He denied any family history of colon cancer or colon polyps.  He denies any symptoms.  Colonoscopy was indicated.    Surgical Findings:   The quality of the bowel prep was fair.  [place] bowel prep scale was 2 for all segments    Description of Procedure:   The Patient was taken to the endoscopy suite and positioned in the left lateral decubitus position with knees flexed. Monitored anesthesia care was administered. A time-out was performed.    The perineum and perianal skin were examined. A digital rectal examination was performed. A well-lubricated adult colonoscope was then inserted and carefully navigated to the cecum. CO2 insufflation was used throughout the procedure. Advancement was accomplished easily. The appendiceal orifice and ileocecal valve were identified and photographed. The terminal ileum was not intubated. The scope was then withdrawn as the mucosa was circumferentially examined.  On the right side, there were pockets of diverticulosis.  In the rectum, polyp most likely hyperplastic was seen and taken with a cold forceps.  This was sent to pathology as  rectal polyp.  No other polyps, masses, AVMs, or changes to the mucosa was seen.  Bowel prep was adequate, allow me to see most of the mucosa.  In the rectum, the scope was retroflexed to evaluate the anorectal junction.  The scope was straightened and excess gas was suctioned from the colon and the scope removed, terminating the procedure.    Anesthesia was terminated and the patient transported to the recovery unit in good condition. The patient tolerated the procedure well without apparent intraoperative complication.    Follow up:   Patient will need next colonoscopy pending biopsy.       Windy Nguyen MD  6/4/2024  11:35 AM

## 2024-06-04 NOTE — DISCHARGE INSTRUCTIONS
Home Care Instructions for Colonoscopy with Sedation    Diet:  - Resume your regular diet as tolerated unless otherwise instructed.  - Start with light meals to minimize bloating.  - Do not drink alcohol today.    Medication:  - If you have questions about resuming your normal medications, please contact your Primary Care Physician.    Activities:  - Take it easy today. Do not return to work today.  - Do not drive today.  - Do not operate any machinery today (including kitchen equipment).    Colonoscopy:  - You may notice some rectal \"spotting\" (a little blood on the toilet tissue) for a day or two after the exam. This is normal.  - If you experience any rectal bleeding (not spotting), persistent tenderness or sharp severe abdominal pains, oral temperature over 100 degrees Fahrenheit, light-headedness or dizziness, or any other problems, contact your doctor.      **If unable to reach your doctor, please go to the [facility] Emergency Room**    - Your referring physician will receive a full report of your examination.  - If you do not hear from your doctor's office within two weeks of your biopsy, please call them for your results.    You may be able to see your laboratory results in [employer] between 4 and 7 business days.  In some cases, your physician may not have viewed the results before they are released to [employer].  If you have questions regarding your results contact the physician who ordered the test/exam by phone or via [employer] by choosing \"Ask a Medical Question.\"

## 2024-06-04 NOTE — ANESTHESIA PREPROCEDURE EVALUATION
PRE-OP EVALUATION    Patient Name: Bill Raygoza    Admit Diagnosis: Colon cancer screening [Z12.11]    Pre-op Diagnosis: Colon cancer screening [Z12.11]    COLONOSCOPY    Anesthesia Procedure: COLONOSCOPY    Surgeons and Role:     * Windy Nguyen MD - Primary    Pre-op vitals reviewed.  Temp: 97.6 °F (36.4 °C)  Pulse: 67  Resp: 18  BP: 138/86  SpO2: 96 %  Body mass index is 30.81 kg/m².    Current medications reviewed.  Hospital Medications:   lactated ringers infusion   Intravenous Continuous       Outpatient Medications:     Facility-Administered Medications Prior to Admission   Medication Dose Route Frequency Provider Last Rate Last Admin    [COMPLETED] triamcinolone acetonide (Kenalog-40) 40 MG/ML injection 40 mg  40 mg Intra-articular Once Chuck Stevens PA-C   40 mg at 04/05/24 0827     Medications Prior to Admission   Medication Sig Dispense Refill Last Dose    Tadalafil (CIALIS) 20 MG Oral Tab Take 1 tablet (20 mg total) by mouth daily as needed for Erectile Dysfunction. 30 tablet 1     PARoxetine 20 MG Oral Tab Take 1 tablet (20 mg total) by mouth every morning. 90 tablet 0     aspirin 325 MG Oral Tab Take 1 tablet (325 mg total) by mouth daily.       Multiple Vitamin (ONE-DAILY MULTI VITAMINS) Oral Tab Take 1 tablet by mouth daily.       PEG 3350-KCl-Na Bicarb-NaCl (TRILYTE) 420 g Oral Recon Soln Starting at 4:00 pm the night before procedure, drink 8 ounces of the prep every 15-20 minutes until finished 1 each 0 not started yet    clobetasol 0.05 % External Cream Apply 1 Application topically 2 (two) times daily. 60 g 1        Allergies: Patient has no known allergies.      Anesthesia Evaluation        Anesthetic Complications           GI/Hepatic/Renal                (+) liver disease                 Cardiovascular        Exercise tolerance: good     MET: >4      (+) hypertension                                     Endo/Other    Negative endo/other ROS.                               Pulmonary                           Neuro/Psych        (+) anxiety                      H/o clear cell carcinoma of right kidney          Past Surgical History:   Procedure Laterality Date    Nephrectomy Right 11/2020     Social History     Socioeconomic History    Marital status:    Tobacco Use    Smoking status: Never    Smokeless tobacco: Never   Vaping Use    Vaping status: Never Used   Substance and Sexual Activity    Alcohol use: Never    Drug use: Never   Other Topics Concern    Caffeine Concern Yes     Comment: tea daily    Exercise Yes     Comment: PT at home     History   Drug Use Unknown     Available pre-op labs reviewed.               Airway      Mallampati: III  Mouth opening: >3 FB  TM distance: > 6 cm  Neck ROM: full Cardiovascular    Cardiovascular exam normal.         Dental             Pulmonary    Pulmonary exam normal.                 Other findings  Dentition grossly normal.  Heavy beard.            ASA: 3   Plan: MAC  NPO status verified and patient meets guidelines.          Plan/risks discussed with: patient                Present on Admission:  **None**

## 2024-06-04 NOTE — ANESTHESIA POSTPROCEDURE EVALUATION
[facility] Patient Status:  Hospital Outpatient Surgery   Age/Gender 58 year old male MRN [id number]   Location [facility] END[facility]COPY [facility] Attending Windy Nguyen MD   Hosp Day # 0 PCP Michael Crabtree MD       Anesthesia Post-op Note    COLONOSCOPY with forcep polypectomy    Procedure Summary       Date: 06/04/24 Room / Location:  ENDOSCOPY 04 / [facility]    Anesthesia Start: 1047 Anesthesia Stop: 1136    Procedure: COLONOSCOPY with forcep polypectomy Diagnosis:       Colon cancer screening      (rectal polyp, right sided diverticulosis)    Surgeons: Windy Nguyen MD Anesthesiologist: Berry Ballard MD    Anesthesia Type: MAC ASA Status: 3            Anesthesia Type: MAC    Vitals Value Taken Time   BP 89/70 06/04/24 1138   Temp  06/04/24 1137   Pulse 72 06/04/24 1138   Resp 18 06/04/24 1138   SpO2 93 % 06/04/24 1138   Vitals shown include unfiled device data.    Patient Location: Endoscopy    Anesthesia Type: MAC    Airway Patency: patent    Postop Pain Control: adequate    Mental Status: mildly sedated but able to meaningfully participate in the post-anesthesia evaluation    Nausea/Vomiting: none    Cardiopulmonary/Hydration status: stable euvolemic    Complications: no apparent anesthesia related complications    Postop vital signs: stable    Dental Exam: Unchanged from Preop    Patient to be discharged home when criteria met.

## 2024-06-04 NOTE — H&P
[facility]  Report of Surgical History and Physical Exam    Bill Raygoza Patient Status:  Hospital Outpatient Surgery    1965 MRN [id number]   Location [facility] ENDOSCOPY PAIN CENTER Attending Windy Nguyen MD   Hosp Day # 0 PCP Michael Crabtree MD     Chief Complaint: Colon cancer screening    History of Present Illness:  Bill Raygoza is a a(n) 58 year old male who presented to the outpatient setting for colon cancer screening.  He denies any symptoms.  He does not have any family history of colon cancer or colon polyps.  Since last clinic visit, he denies new issues.      History:  Past Medical History:    Carcinoid tumor of kidney (HCC)    Essential hypertension       Past Surgical History:   Procedure Laterality Date    Nephrectomy Right 2020       Family History   Problem Relation Age of Onset    Cancer Father         prostate    Heart Disease Father     Diabetes Mother     Arthritis Mother         reports that he has never smoked. He has never used smokeless tobacco. He reports that he does not drink alcohol and does not use drugs.      Allergies:  No Known Allergies      Medications:  No current facility-administered medications for this encounter.      Review of Systems:  The review of systems was negative other than the above listed HPI and past medical history including the HEENT, Heart, Lungs, GI, , Neuro, Musculoskeletal, Hematologic, Endocrine and Psych.       Physical Exam:  Height 74\", weight 240 lb (108.9 kg).  General: Alert, orientated x3.  Cooperative.  No apparent distress.  HEENT: Exam is unremarkable.  Without scleral icterus.  Mucous membranes are moist. EOM are WNL.  Neck: No tenderness to palpitation.  Full range of motion to flexion and extension, lateral rotation and lateral flexion of cervical spine.  No JVD. Supple.   Lungs: Bilateral chest rise. Good excursion of the diaphragms. No secondary use of accessory respiratory musculature.  Cardiac: Regular rate  and rhythm. No murmur.  Abdomen: Soft, nontender, nondistended  Extremities:  No lower extremity edema noted.  Without clubbing or cyanosis.  2+ pulses x4  Skin: Normal texture and turgor.      Laboratory Data and Relevant Imaging:  No results for input(s): \"RBC\", \"HGB\", \"HCT\", \"MCV\", \"MCH\", \"MCHC\", \"RDW\", \"NEPRELIM\", \"WBC\", \"PLT\" in the last 168 hours.    No results for input(s): \"GLU\", \"BUN\", \"CREATSERUM\", \"GFRAA\", \"GFRNAA\", \"CA\", \"ALB\", \"NA\", \"K\", \"CL\", \"CO2\", \"ALKPHO\", \"AST\", \"ALT\", \"BILT\", \"TP\" in the last 168 hours.      No results for input(s): \"PTP\", \"INR\", \"PTT\" in the last 168 hours.    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)    Essential hypertension    Premature ejaculation    Noise-induced hearing loss of both ears    Sensory hearing loss, bilateral    Abnormal auditory perception, unspecified laterality    TITI (generalized anxiety disorder)    Lipoma of torso    Fatty liver    Lumbar radiculopathy    Colon cancer screening       58 year old male with colon cancer screening    Will proceed with colonoscopy with possible biopsy  All questions answered    Thank you for letting me participate in the care of this patient    Windy Nguyen MD  [facility] General Surgery  6/4/2024  9:37 AM

## 2024-06-05 ENCOUNTER — TELEPHONE (OUTPATIENT)
Facility: LOCATION | Age: 59
End: 2024-06-05

## 2024-06-05 NOTE — TELEPHONE ENCOUNTER
----- Message from Windy Nguyen sent at 6/5/2024  2:24 PM CDT -----  Results reviewed, recall in 5 years  ---------------    5-year colonoscopy recall placed, and Care Gaps updated.

## 2024-06-19 ENCOUNTER — TELEPHONE (OUTPATIENT)
Facility: LOCATION | Age: 59
End: 2024-06-19

## 2024-06-19 DIAGNOSIS — D17.1 LIPOMA OF TORSO: Primary | ICD-10-CM

## 2024-07-18 ENCOUNTER — OFFICE VISIT (OUTPATIENT)
Dept: FAMILY MEDICINE CLINIC | Facility: CLINIC | Age: 59
End: 2024-07-18
Payer: MEDICAID

## 2024-07-18 ENCOUNTER — LAB ENCOUNTER (OUTPATIENT)
Dept: LAB | Age: 59
End: 2024-07-18
Attending: FAMILY MEDICINE
Payer: MEDICAID

## 2024-07-18 VITALS
BODY MASS INDEX: 31.32 KG/M2 | HEIGHT: 74 IN | TEMPERATURE: 98 F | SYSTOLIC BLOOD PRESSURE: 124 MMHG | HEART RATE: 74 BPM | RESPIRATION RATE: 16 BRPM | WEIGHT: 244 LBS | OXYGEN SATURATION: 98 % | DIASTOLIC BLOOD PRESSURE: 72 MMHG

## 2024-07-18 DIAGNOSIS — N18.30 STAGE 3 CHRONIC KIDNEY DISEASE, UNSPECIFIED WHETHER STAGE 3A OR 3B CKD (HCC): ICD-10-CM

## 2024-07-18 DIAGNOSIS — N18.9 VITAMIN D DEFICIENCY DUE TO CHRONIC KIDNEY DISEASE: ICD-10-CM

## 2024-07-18 DIAGNOSIS — E55.9 VITAMIN D DEFICIENCY DUE TO CHRONIC KIDNEY DISEASE: ICD-10-CM

## 2024-07-18 DIAGNOSIS — Z00.00 WELLNESS EXAMINATION: ICD-10-CM

## 2024-07-18 DIAGNOSIS — Z00.00 WELLNESS EXAMINATION: Primary | ICD-10-CM

## 2024-07-18 LAB
ALBUMIN SERPL-MCNC: 4.2 G/DL (ref 3.2–4.8)
ALBUMIN/GLOB SERPL: 1.4 {RATIO} (ref 1–2)
ALP LIVER SERPL-CCNC: 109 U/L
ALT SERPL-CCNC: 29 U/L
ANION GAP SERPL CALC-SCNC: 6 MMOL/L (ref 0–18)
AST SERPL-CCNC: 24 U/L (ref ?–34)
BASOPHILS # BLD AUTO: 0.16 X10(3) UL (ref 0–0.2)
BASOPHILS NFR BLD AUTO: 1.8 %
BILIRUB SERPL-MCNC: 0.6 MG/DL (ref 0.3–1.2)
BILIRUB UR QL STRIP.AUTO: NEGATIVE
BUN BLD-MCNC: 13 MG/DL (ref 9–23)
CALCIUM BLD-MCNC: 9.2 MG/DL (ref 8.7–10.4)
CHLORIDE SERPL-SCNC: 107 MMOL/L (ref 98–112)
CHOLEST SERPL-MCNC: 156 MG/DL (ref ?–200)
CLARITY UR REFRACT.AUTO: CLEAR
CO2 SERPL-SCNC: 26 MMOL/L (ref 21–32)
COLOR UR AUTO: YELLOW
CREAT BLD-MCNC: 1.06 MG/DL
EGFRCR SERPLBLD CKD-EPI 2021: 81 ML/MIN/1.73M2 (ref 60–?)
EOSINOPHIL # BLD AUTO: 0.29 X10(3) UL (ref 0–0.7)
EOSINOPHIL NFR BLD AUTO: 3.3 %
ERYTHROCYTE [DISTWIDTH] IN BLOOD BY AUTOMATED COUNT: 12.9 %
FASTING PATIENT LIPID ANSWER: YES
FASTING STATUS PATIENT QL REPORTED: YES
GLOBULIN PLAS-MCNC: 2.9 G/DL (ref 2.8–4.4)
GLUCOSE BLD-MCNC: 121 MG/DL (ref 70–99)
GLUCOSE UR STRIP.AUTO-MCNC: NORMAL MG/DL
HCT VFR BLD AUTO: 50 %
HDLC SERPL-MCNC: 52 MG/DL (ref 40–59)
HGB BLD-MCNC: 16.7 G/DL
IMM GRANULOCYTES # BLD AUTO: 0.03 X10(3) UL (ref 0–1)
IMM GRANULOCYTES NFR BLD: 0.3 %
KETONES UR STRIP.AUTO-MCNC: NEGATIVE MG/DL
LDLC SERPL CALC-MCNC: 86 MG/DL (ref ?–100)
LEUKOCYTE ESTERASE UR QL STRIP.AUTO: NEGATIVE
LYMPHOCYTES # BLD AUTO: 3.13 X10(3) UL (ref 1–4)
LYMPHOCYTES NFR BLD AUTO: 35.1 %
MAGNESIUM SERPL-MCNC: 2 MG/DL (ref 1.6–2.6)
MCH RBC QN AUTO: 30 PG (ref 26–34)
MCHC RBC AUTO-ENTMCNC: 33.4 G/DL (ref 31–37)
MCV RBC AUTO: 89.8 FL
MONOCYTES # BLD AUTO: 0.71 X10(3) UL (ref 0.1–1)
MONOCYTES NFR BLD AUTO: 8 %
NEUTROPHILS # BLD AUTO: 4.59 X10 (3) UL (ref 1.5–7.7)
NEUTROPHILS # BLD AUTO: 4.59 X10(3) UL (ref 1.5–7.7)
NEUTROPHILS NFR BLD AUTO: 51.5 %
NITRITE UR QL STRIP.AUTO: NEGATIVE
NONHDLC SERPL-MCNC: 104 MG/DL (ref ?–130)
OSMOLALITY SERPL CALC.SUM OF ELEC: 289 MOSM/KG (ref 275–295)
PH UR STRIP.AUTO: 6 [PH] (ref 5–8)
PHOSPHATE SERPL-MCNC: 2.9 MG/DL (ref 2.4–5.1)
PLATELET # BLD AUTO: 318 10(3)UL (ref 150–450)
POTASSIUM SERPL-SCNC: 4.1 MMOL/L (ref 3.5–5.1)
PROT SERPL-MCNC: 7.1 G/DL (ref 5.7–8.2)
PTH-INTACT SERPL-MCNC: 85.7 PG/ML (ref 18.5–88)
RBC # BLD AUTO: 5.57 X10(6)UL
RBC UR QL AUTO: NEGATIVE
SODIUM SERPL-SCNC: 139 MMOL/L (ref 136–145)
SP GR UR STRIP.AUTO: 1.02 (ref 1–1.03)
T4 FREE SERPL-MCNC: 1.2 NG/DL (ref 0.8–1.7)
TRIGL SERPL-MCNC: 97 MG/DL (ref 30–149)
TSI SER-ACNC: 0.82 MIU/ML (ref 0.55–4.78)
UROBILINOGEN UR STRIP.AUTO-MCNC: NORMAL MG/DL
VIT D+METAB SERPL-MCNC: 25 NG/ML (ref 30–100)
VLDLC SERPL CALC-MCNC: 15 MG/DL (ref 0–30)
WBC # BLD AUTO: 8.9 X10(3) UL (ref 4–11)

## 2024-07-18 PROCEDURE — 99396 PREV VISIT EST AGE 40-64: CPT | Performed by: FAMILY MEDICINE

## 2024-07-18 PROCEDURE — 84439 ASSAY OF FREE THYROXINE: CPT

## 2024-07-18 PROCEDURE — 83970 ASSAY OF PARATHORMONE: CPT

## 2024-07-18 PROCEDURE — 81003 URINALYSIS AUTO W/O SCOPE: CPT

## 2024-07-18 PROCEDURE — 83735 ASSAY OF MAGNESIUM: CPT

## 2024-07-18 PROCEDURE — 36415 COLL VENOUS BLD VENIPUNCTURE: CPT

## 2024-07-18 PROCEDURE — 80061 LIPID PANEL: CPT

## 2024-07-18 PROCEDURE — 84443 ASSAY THYROID STIM HORMONE: CPT

## 2024-07-18 PROCEDURE — 82306 VITAMIN D 25 HYDROXY: CPT

## 2024-07-18 PROCEDURE — 84100 ASSAY OF PHOSPHORUS: CPT

## 2024-07-18 PROCEDURE — 85025 COMPLETE CBC W/AUTO DIFF WBC: CPT

## 2024-07-18 PROCEDURE — 80053 COMPREHEN METABOLIC PANEL: CPT

## 2024-07-18 NOTE — PATIENT INSTRUCTIONS
Thank you for choosing Michael Crabtree MD at [facility]  To Do: Bill Raygoza  1. Please see age appropriate health prevention below     Call [phone] to schedule the appointment.   Please signup for [employer], which is electronic access to your record if you have not done so.  All your results will post on there.  [email or web address]   You can NOW use [employer] to book your appointments with us, or consider using open access scheduling which is through the [place] website [email or web address] and type in Michael Crabtree MD and follow the links for \"Schedule Online Now\"    To schedule Imaging or tests at [facility] call Central Scheduling [phone], Go to [facility] ER Building (For example: CT scans, X rays, Ultrasound, MRI)  Cardiac Testing in ER building Building A second floor Cardiac Testing [phone] (For example: Holter Monitor, Cardiac Stress tests,Event Monitor, or 2D Echocardiograms)  [facility] call [phone] usually in [facility]  Walk in Clinic in [place] at [address] Mon-Fri at 8am-7:30 p.m., and Sat/Sun 9:00a.m.-4:30 p.m.  Also at [address] [place] [address]  Call [phone] for info     Please call our office about any questions regarding your treatment/medicines/tests as a result of today's visit.  For your safety, read the entire package insert of all medicines prescribed to you and be aware of all of the risks of treatment even beyond those discussed today.  All therapies have potential risk of harm or side effects or medication interactions.  It is your duty and for your safety to discuss with the pharmacist and our office with questions, and to notify us and stop treatment if problems arise, but know that our intention is that the benefits outweigh those potential risks and we strive to make you healthier and to improve your quality of life.    Referrals, and Radiology Information:    If your insurance requires a referral to a specialist, please  allow 5 business days to process your referral request.    If Michael Crabtree MD orders a CT or MRI, it may take up to 10 business days to receive approval from your insurance company. Once our office has called informing you that the insurance company approved your testing, please call Central Scheduling at [phone]  Please allow our office 5 business days to contact you regarding any testing results.    Refill policies:   Allow 3 business days for refills; controlled substances may take longer and must be picked up from the office in person.  Narcotic medications can only be filled in 30 day increments and must be refilled at an office visit only.  If your prescription is due for a refill, you may be due for a follow-up appointment.  We cannot refill your maintenance medications at a preventative wellness visit.  To best provide you care, patients receiving maintenance medications need to be seen at least twice a year.

## 2024-07-18 NOTE — PROGRESS NOTES
Wellness Exam    REASON FOR VISIT:    Bill Raygoza is a 58 year old male who presents for an Annual Health Assessment.    Current Complaints: Mr. Raygoza is here for his wellness exam  Flu Shot: see immunization record  Health Maintenance Topics with due status: Overdue       Topic Date Due    Annual Physical Never done    Pneumococcal Vaccine: Birth to 64yrs Never done    DTaP,Tdap,and Td Vaccines Never done    Zoster Vaccines Never done    COVID-19 Vaccine 09/01/2023    Annual Depression Screening 01/01/2024     Reported Health:   Mr. Raygoza is a very pleasant 58-year-old gentleman with known history of clear-cell carcinoma of the right kidney status post nephrectomy, hypertension, anxiety, chronic kidney disease, arthritis here today for his wellness exam.  He does not have any concerns or complaints.  Blood pressure is under good control without taking any medications at this point.  He is due to have his laboratory test done.  No fever no cough no chest pain or shortness of breath no nausea no vomiting no abdominal pain.  He had his colonoscopy few weeks ago which showed benign single polyp but was advised to have repeat colonoscopy in 5 years.  He also is scheduled to have his lipoma of his back removed next month. I had reviewed past medical and family histories together with allergy and medication lists documented.    Details about the complaints:  N/A    General Health     How would you describe your current health state?: Good    Type of Diet: Balanced    How do you maintain positive mental well-being?: Social Interaction    How would you describe your daily physical activity?: Moderate    If you are a male age 45-79 or a female age 55-79, do you take aspirin?: No    Have you had any immunizations at another office such as Influenza, Hepatitis B, Tetanus, or Pneumococcal?: No    At any time do you feel concerned for the safety/well-being of yourself and/or your children, in your home or elsewhere?: No      CAGE:     Cut: Have you ever felt you should Cut down on your drinking?: No    Annoyed: Have people Annoyed you by criticizing your drinking?: No    Guilty: Have you ever felt bad or Guilty about your drinking?: No    Eye Opener: Have you ever had a drink first thing in the morning to steady your nerves or to get rid of a hangover (Eye opener)?: No    Scoring  Total Score: 0     PHQ-4: Over the LAST 2 WEEKS       Depression Screening (PHQ-2/PHQ-9): Over the LAST 2 WEEKS   Little interest or pleasure in doing things (over the last two weeks)?: Not at all    Feeling down, depressed, or hopeless (over the last two weeks)?: Not at all    PHQ-2 SCORE: 0              PREVENTATIVE SERVICES  INDICATIONS AND SCHEDULE Recommendation Internal Lab or Procedure External Lab or Procedure   Colonoscopy Screen Every 10 years Health Maintenance   Topic Date Due    Colorectal Cancer Screening  06/04/2029       Flex Sigmoidoscopy Screen  Every 5 years No results found for this or any previous visit.    Fecal Occult Blood  Annually No results found for: \"FOB\", \"OCCULTSTOOL\"    Obesity Screening Screen all adults annually Body mass index is 31.33 kg/m².      Preventive Services for Which Recommendations Vary with Risk Recommendation Internal Lab or Procedure External Lab or Procedure   Cholesterol Screening Recommended screening varies with age, risk and gender LDL Cholesterol (mg/dL)   Date Value   07/26/2023 77       Diabetes Screening  If history of high blood pressure or other  risk factors No results found for: \"A1C\"  Glucose (mg/dL)   Date Value   07/26/2023 117 (H)         Gonorrhea Screening If high risk No results found for: \"GONOCOCCUS\"    HIV Screening For all adults age 18-65, older adults at increased risk No results found for: \"HIV\"    Syphilis Screening Screen if pregnant or high risk No results found for: \"RPR\"    Hepatitis C Screening Screen those at high risk plus screen one time for adults born 1946-1 965 No  results found for: \"HCVAB\"    Tuberculosis Screen If high risk No components found for: \"PPDINDURAT\"      ALLERGIES:   No Known Allergies    CURRENT MEDICATIONS:   Current Outpatient Medications   Medication Sig Dispense Refill    Tadalafil (CIALIS) 20 MG Oral Tab Take 1 tablet (20 mg total) by mouth daily as needed for Erectile Dysfunction. 30 tablet 1    PARoxetine 20 MG Oral Tab Take 1 tablet (20 mg total) by mouth every morning. 90 tablet 0    aspirin 325 MG Oral Tab Take 1 tablet (325 mg total) by mouth daily.      Multiple Vitamin (ONE-DAILY MULTI VITAMINS) Oral Tab Take 1 tablet by mouth daily.      clobetasol 0.05 % External Cream Apply 1 Application topically 2 (two) times daily. 60 g 1      MEDICAL INFORMATION:   Past Medical History:    Carcinoid tumor of kidney (HCC)    Essential hypertension      Past Surgical History:   Procedure Laterality Date    Colonoscopy N/A 6/4/2024    Procedure: COLONOSCOPY with forcep polypectomy;  Surgeon: Windy Nguyen MD;  Location:  ENDOSCOPY    Nephrectomy Right 11/2020      Family History   Problem Relation Age of Onset    Cancer Father         prostate    Heart Disease Father     Diabetes Mother     Arthritis Mother       SOCIAL HISTORY:   Social History     Socioeconomic History    Marital status:    Tobacco Use    Smoking status: Never    Smokeless tobacco: Never   Vaping Use    Vaping status: Never Used   Substance and Sexual Activity    Alcohol use: Never    Drug use: Never   Other Topics Concern    Caffeine Concern Yes     Comment: tea daily    Exercise Yes     Comment: PT at home          REVIEW OF SYSTEMS:   Constitutional: Negative for fever, chills and fatigue.   HENT: Negative for hearing loss, congestion, sore throat and neck pain.    Eyes: Negative for pain and visual disturbance.   Respiratory: Negative for cough and shortness of breath.    Cardiovascular: Negative for chest pain and palpitations.   Gastrointestinal: Negative for nausea,  vomiting, abdominal pain and diarrhea.   Genitourinary: Negative for urgency, frequency and difficulty urinating.   Musculoskeletal: Negative for arthralgias and no gait problem.   Skin: Negative for color change and rash.   Neurological: Negative for tremors, weakness and numbness.   Hematological: Negative for adenopathy. Does not bruise/bleed easily.   Psychiatric/Behavioral: Negative for confusion and agitation. The patient is not nervous/anxious.      EXAM:   /72   Pulse 74   Temp 98 °F (36.7 °C) (Temporal)   Resp 16   Ht 6' 2\" (1.88 m)   Wt 244 lb (110.7 kg)   SpO2 98%   BMI 31.33 kg/m²   Constitutional: He appears well-developed, nourished, and his stated age. Vital signs reviewed  Pleasant man   Head: Normocephalic and atraumatic.   Ear: TMs visible and normal bilaterally  Nose: Nose normal.   Eyes: EOM are normal. Pupils are equal, round, and reactive to light. No scleral icterus.   Neck: Normal range of motion. No thyromegaly present.   Cardiovascular: Normal rate, regular rhythm and normal heart sounds.  Exam reveals no friction rub.    No murmur heard.  Pulmonary/Chest: Effort normal and breath sounds normal. He has no wheezes. He has no rales.   Abdominal: Soft. Bowel sounds are normal. There is no tenderness.   Musculoskeletal: Normal range of motion. He exhibits no edema.   Lymphadenopathy:     He has no cervical adenopathy.   Neurological: He is alert and oriented to person, place, and time. He has normal reflexes.   Skin: Skin is warm. No rash noted. No erythema. with normal hair  Psychiatric: He has a normal mood and affect. His behavior is normal.     ASSESSMENT AND OTHER RELEVANT CHRONIC CONDITIONS:   Bill Raygoza is a 58 year old male who presents for an Annual Health Assessment.   1. Wellness examination        PLAN SUMMARY:   Bill Raygoza is a 58 year old male  Age appropriate cancer screening, labs, safety, immunizations were discussed with the patient and ordered as  follows:  Diagnoses and all orders for this visit:    Wellness examination      Advise weight loss by lowering dietary portions and increasing activity.  We shall check routine labs and notify him once we get test results.  Follow-up with nephrology.  Follow-up in 6 months or as needed.  Continue with current medications.      This note was prepared using Dragon Medical voice recognition dictation software. As a result errors may occur. When identified these errors have been corrected. While every attempt is made to correct errors during dictation discrepancies may still exist          No orders of the defined types were placed in this encounter.      Imaging & Consults:  None    His 5 year prevention plan includes: annual visits for fasting labs  Health Maintenance   Topic Date Due    Annual Physical  Never done    Pneumococcal Vaccine: Birth to 64yrs (1 of 2 - PCV) Never done    DTaP,Tdap,and Td Vaccines (1 - Tdap) Never done    Zoster Vaccines (1 of 2) Never done    COVID-19 Vaccine (4 - 2023-24 season) 09/01/2023    Annual Depression Screening  01/01/2024    Influenza Vaccine (1) 10/01/2024    PSA  07/26/2025    Colorectal Cancer Screening  06/04/2029     Patient/Caregiver Education:  Patient/Caregiver Education: There are no barriers to learning. Medical education done.  Outcome: Patient verbalizes understanding.    Educated by: MD   The patient indicates understanding of these issues and agrees to the plan.    SUGGESTED VACCINATIONS - Influenza, Pneumococcal, Zoster, Tetanus     Immunization History   Administered Date(s) Administered    Covid-19 Vaccine Pfizer 30 mcg/0.3 ml 04/20/2021, 05/11/2021, 01/10/2022    FLULAVAL 6 months & older 0.5 ml Prefilled syringe (20360) 09/28/2021    FLUZONE 6 months and older PFS 0.5 ml (02116) 10/13/2020, 09/28/2021, 10/23/2023    Fluvirin, 3 Years & >, Im 11/18/2012, 01/23/2015, 02/22/2016, 10/17/2016    Influenza 03/14/2018, 10/01/2020       Influenza Annually    Pneumococcal if high risk   Td/Tdap once then every 10 years   HPV Males 11-21   Zoster (Shingles) 60 and older: one dose   Varicella 2 doses if not immune   MMR 1-2 doses if born after 1957 and not immune     Patient Active Problem List   Diagnosis    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

## 2024-07-19 DIAGNOSIS — E55.9 VITAMIN D DEFICIENCY: Primary | ICD-10-CM

## 2024-07-19 RX ORDER — ERGOCALCIFEROL 1.25 MG/1
50000 CAPSULE ORAL WEEKLY
Qty: 8 CAPSULE | Refills: 0 | Status: SHIPPED | OUTPATIENT
Start: 2024-07-19 | End: 2024-09-13

## 2024-07-26 ENCOUNTER — TELEPHONE (OUTPATIENT)
Facility: LOCATION | Age: 59
End: 2024-07-26

## 2024-07-26 DIAGNOSIS — D17.1 LIPOMA OF FLANK: Primary | ICD-10-CM

## 2024-07-26 DIAGNOSIS — D17.1 LIPOMA OF TORSO: ICD-10-CM

## 2024-07-30 DIAGNOSIS — F41.1 GAD (GENERALIZED ANXIETY DISORDER): ICD-10-CM

## 2024-07-31 RX ORDER — PAROXETINE HYDROCHLORIDE 20 MG/1
20 TABLET, FILM COATED ORAL EVERY MORNING
Qty: 90 TABLET | Refills: 0 | Status: SHIPPED | OUTPATIENT
Start: 2024-07-31

## 2024-07-31 NOTE — TELEPHONE ENCOUNTER
PARoxetine 20 MG Oral Tab         Sig: Take 1 tablet (20 mg total) by mouth every morning.    Disp: 90 tablet    Refills: 0    Start: 7/30/2024    Class: Normal    Non-formulary For: TITI (generalized anxiety disorder)    Last ordered: 4 months ago (3/7/2024) by Mikey Sánchez DO    Psychiatric Non-Scheduled (Anti-Anxiety) [id number]/30/2024 07:51 AM   Protocol Details In person appointment or virtual visit in the past 6 mos or appointment in next 3 mos    Depression Screening completed within the past 12 months      To be filled at: [id number] [place], [place] - [address] AT [facility] [address], [phone], [phone]

## 2024-09-26 ENCOUNTER — LABORATORY ENCOUNTER (OUTPATIENT)
Dept: LAB | Age: 59
End: 2024-09-26
Attending: STUDENT IN AN ORGANIZED HEALTH CARE EDUCATION/TRAINING PROGRAM
Payer: MEDICAID

## 2024-09-26 DIAGNOSIS — E55.9 VITAMIN D DEFICIENCY: ICD-10-CM

## 2024-09-26 DIAGNOSIS — Z01.818 PRE-OP TESTING: ICD-10-CM

## 2024-09-26 LAB
ANION GAP SERPL CALC-SCNC: 4 MMOL/L (ref 0–18)
BUN BLD-MCNC: 13 MG/DL (ref 9–23)
CALCIUM BLD-MCNC: 9.7 MG/DL (ref 8.7–10.4)
CHLORIDE SERPL-SCNC: 106 MMOL/L (ref 98–112)
CO2 SERPL-SCNC: 30 MMOL/L (ref 21–32)
CREAT BLD-MCNC: 1.24 MG/DL
EGFRCR SERPLBLD CKD-EPI 2021: 67 ML/MIN/1.73M2 (ref 60–?)
FASTING STATUS PATIENT QL REPORTED: YES
GLUCOSE BLD-MCNC: 124 MG/DL (ref 70–99)
OSMOLALITY SERPL CALC.SUM OF ELEC: 292 MOSM/KG (ref 275–295)
POTASSIUM SERPL-SCNC: 4.7 MMOL/L (ref 3.5–5.1)
SODIUM SERPL-SCNC: 140 MMOL/L (ref 136–145)
VIT D+METAB SERPL-MCNC: 51.3 NG/ML (ref 30–100)

## 2024-09-26 PROCEDURE — 36415 COLL VENOUS BLD VENIPUNCTURE: CPT

## 2024-09-26 PROCEDURE — 82306 VITAMIN D 25 HYDROXY: CPT

## 2024-09-26 PROCEDURE — 80048 BASIC METABOLIC PNL TOTAL CA: CPT

## 2024-09-30 ENCOUNTER — TELEPHONE (OUTPATIENT)
Facility: LOCATION | Age: 59
End: 2024-09-30

## 2024-09-30 NOTE — TELEPHONE ENCOUNTER
JOSE PIEDRA Patient  Member ID  [id number]    Date of Birth  1965-12-21    Gender  Male    Transaction Type  Outpatient Authorization    Organization  [facility]    Payer  [facility] logo     Certificate Information  Reference Number  [id number]    Status  NO ACTION REQUIRED    Message  Requested Service does not require preauthorization. We would strongly encourage you to check benefits for this service.    Member Information  Patient Name  JOSE PIEDRA    Patient Date of Birth  1965-12-21    Patient Gender  Male    Member ID  [id number]    Relationship to Subscriber  Self    Subscriber Name  JOSE PIEDRA    Requesting Provider     Name  PATEL, EDUARDO    NPI  [id number]    Tax Id  [id number]    Specialty  [id number]    Provider Role  Provider    Address  [address] [place] [address]    Phone  [phone]    Fax  [phone]    Contact Name  ANAI JARAMILLO    Service Information  Service Type  2 - Surgical    Place of Service  22 - [facility]    Service From - To Date  2024-10-14 - 2024-12-31    Level of Service  Elective    Diagnosis Code 1  D171 - Benign lipomatous neoplasm of skin subcu of trunk    Procedure Code 1 (CPT/HCPCS)  59528 - EXC BACK LES SC 3 CM/>    Quantity  1 Units    Status  NO ACTION REQUIRED

## 2024-10-01 ENCOUNTER — OFFICE VISIT (OUTPATIENT)
Dept: NEPHROLOGY | Facility: CLINIC | Age: 59
End: 2024-10-01
Payer: MEDICAID

## 2024-10-01 VITALS — WEIGHT: 239.38 LBS | DIASTOLIC BLOOD PRESSURE: 74 MMHG | SYSTOLIC BLOOD PRESSURE: 116 MMHG | BODY MASS INDEX: 31 KG/M2

## 2024-10-01 DIAGNOSIS — N18.31 STAGE 3A CHRONIC KIDNEY DISEASE (HCC): Primary | ICD-10-CM

## 2024-10-01 PROCEDURE — 99213 OFFICE O/P EST LOW 20 MIN: CPT | Performed by: INTERNAL MEDICINE

## 2024-10-01 NOTE — PROGRESS NOTES
Nephrology Consult Note      REASON FOR CONSULT:  S/p Nephrectomy; CKD 3         HPI:   Bill Raygoza is a 58 year old male with   Chief Complaint   Patient presents with    Chronic Kidney Disease     Stage 3     Hypertension     Michael Crabtree MD    57 y/o male with hx of HTN, Renal Ca- s/p nephrectomy @ [facility] about 1.5 yrs ago presents for follow up evaluation.   Patient is in his usual state of health.  Denies any cp/sob  No n/v  No f/c  No weight loss  Denies any urinary problems    Cr <1.5 @ baseline    ROS:     Denies fever/chills  Denies wt loss/gain  Denies HA or visual changes  Denies CP or palpitations  Denies SOB/cough/hemoptysis  Denies abd or flank pain  Denies N/V/D  Denies change in urinary habits or gross hematuria  Denies LE edema  Denies skin rashes/myalgias/arthralgias      PMH:  Past Medical History:    Back problem    Cancer (HCC)    CLEAR CELL RENAL CARCINOMA    Carcinoid tumor of kidney (HCC)    Essential hypertension    Hearing impaired person, bilateral    HEARING AIDS BILATERAL    Renal disorder    CKD. HX OF NEPHRECTOMY, FOLLOWS YEARLY WITH UROLOGY         PSH:  Past Surgical History:   Procedure Laterality Date    Colonoscopy N/A 6/4/2024    Procedure: COLONOSCOPY with forcep polypectomy;  Surgeon: Windy Nguyen MD;  Location:  ENDOSCOPY    Nephrectomy Right 11/2020         Medications (Active prior to today's visit):  Current Outpatient Medications   Medication Sig Dispense Refill    PARoxetine 20 MG Oral Tab Take 1 tablet (20 mg total) by mouth every morning. 90 tablet 0    Tadalafil (CIALIS) 20 MG Oral Tab Take 1 tablet (20 mg total) by mouth daily as needed for Erectile Dysfunction. 30 tablet 1    clobetasol 0.05 % External Cream Apply 1 Application topically 2 (two) times daily. 60 g 1    aspirin 325 MG Oral Tab Take 1 tablet (325 mg total) by mouth daily.      Multiple Vitamin (ONE-DAILY MULTI VITAMINS) Oral Tab Take 1 tablet by mouth daily.            Allergies:  No Known Allergies    Social History:  Social History     Socioeconomic History    Marital status:    Tobacco Use    Smoking status: Never    Smokeless tobacco: Never   Vaping Use    Vaping status: Never Used   Substance and Sexual Activity    Alcohol use: Never    Drug use: Never   Other Topics Concern    Caffeine Concern Yes     Comment: tea daily    Exercise Yes     Comment: PT at home          Family History:  Family History   Problem Relation Age of Onset    Cancer Father         prostate    Heart Disease Father     Diabetes Mother     Arthritis Mother             PHYSICAL EXAM:   /74 (BP Location: Right arm)   Wt 239 lb 6.4 oz (108.6 kg)   BMI 30.74 kg/m²    Wt Readings from Last 6 Encounters:   10/01/24 239 lb 6.4 oz (108.6 kg)   07/18/24 244 lb (110.7 kg)   09/23/24 240 lb (108.9 kg)   06/04/24 240 lb (108.9 kg)   05/09/24 243 lb (110.2 kg)   04/05/24 212 lb (96.2 kg)     General: Alert and oriented in no apparent distress.  HEENT: No scleral icterus, MMM  Neck: Supple, no CHANCE or thyromegaly  Cardiac: Regular rate and rhythm, S1, S2 normal, no murmur or rub  Lungs: Clear without wheezes, rales, rhonchi.    Abdomen: Soft, non-tender. + bowel sounds, no palpable organomegaly  Extremities: Without clubbing, cyanosis or edema.  Neurologic:  normal affect, cranial nerves grossly intact, moving all extremities  Skin: Warm and dry, no rashes    Labs reviewed       ASSESSMENT/PLAN:   There are no diagnoses linked to this encounter.      1. CKD - stg 3; related to L nephrectomy - pt w/ hx of HTN which is well controlled.  - monitor labs  - monitor CKD labs      2. Hx of Renal Ca - s/p L nephrectomy; recent CT reviewed; no evidence of any recurrence  - follow w/ urology    3. HTN- low salt diet recommended; stable     4. Vit d Def- continue otc; @ goal        F/U in 12 mos    Aravind Apodaca MD  10/1/2024

## 2024-10-13 ENCOUNTER — ANESTHESIA EVENT (OUTPATIENT)
Dept: SURGERY | Facility: HOSPITAL | Age: 59
End: 2024-10-13
Payer: MEDICAID

## 2024-10-14 ENCOUNTER — HOSPITAL ENCOUNTER (OUTPATIENT)
Facility: HOSPITAL | Age: 59
Setting detail: HOSPITAL OUTPATIENT SURGERY
Discharge: HOME | End: 2024-10-14
Attending: STUDENT IN AN ORGANIZED HEALTH CARE EDUCATION/TRAINING PROGRAM | Admitting: STUDENT IN AN ORGANIZED HEALTH CARE EDUCATION/TRAINING PROGRAM
Payer: MEDICAID

## 2024-10-14 ENCOUNTER — ANESTHESIA (OUTPATIENT)
Dept: SURGERY | Facility: HOSPITAL | Age: 59
End: 2024-10-14
Payer: MEDICAID

## 2024-10-14 VITALS
HEART RATE: 61 BPM | OXYGEN SATURATION: 100 % | DIASTOLIC BLOOD PRESSURE: 82 MMHG | WEIGHT: 240 LBS | SYSTOLIC BLOOD PRESSURE: 131 MMHG | BODY MASS INDEX: 30.8 KG/M2 | TEMPERATURE: 98 F | HEIGHT: 74 IN | RESPIRATION RATE: 18 BRPM

## 2024-10-14 DIAGNOSIS — D17.1 LIPOMA OF TORSO: ICD-10-CM

## 2024-10-14 DIAGNOSIS — Z01.818 PRE-OP TESTING: Primary | ICD-10-CM

## 2024-10-14 PROCEDURE — 88304 TISSUE EXAM BY PATHOLOGIST: CPT | Performed by: STUDENT IN AN ORGANIZED HEALTH CARE EDUCATION/TRAINING PROGRAM

## 2024-10-14 PROCEDURE — 0JB80ZZ EXCISION OF ABDOMEN SUBCUTANEOUS TISSUE AND FASCIA, OPEN APPROACH: ICD-10-PCS | Performed by: STUDENT IN AN ORGANIZED HEALTH CARE EDUCATION/TRAINING PROGRAM

## 2024-10-14 RX ORDER — SCOLOPAMINE TRANSDERMAL SYSTEM 1 MG/1
1 PATCH, EXTENDED RELEASE TRANSDERMAL ONCE
Status: DISCONTINUED | OUTPATIENT
Start: 2024-10-14 | End: 2024-10-14 | Stop reason: HOSPADM

## 2024-10-14 RX ORDER — HYDROMORPHONE HYDROCHLORIDE 1 MG/ML
0.2 INJECTION, SOLUTION INTRAMUSCULAR; INTRAVENOUS; SUBCUTANEOUS EVERY 5 MIN PRN
Status: DISCONTINUED | OUTPATIENT
Start: 2024-10-14 | End: 2024-10-14

## 2024-10-14 RX ORDER — ONDANSETRON 2 MG/ML
INJECTION INTRAMUSCULAR; INTRAVENOUS AS NEEDED
Status: DISCONTINUED | OUTPATIENT
Start: 2024-10-14 | End: 2024-10-14 | Stop reason: SURG

## 2024-10-14 RX ORDER — SODIUM CHLORIDE, SODIUM LACTATE, POTASSIUM CHLORIDE, CALCIUM CHLORIDE 600; 310; 30; 20 MG/100ML; MG/100ML; MG/100ML; MG/100ML
INJECTION, SOLUTION INTRAVENOUS CONTINUOUS
Status: DISCONTINUED | OUTPATIENT
Start: 2024-10-14 | End: 2024-10-14

## 2024-10-14 RX ORDER — ACETAMINOPHEN 500 MG
1000 TABLET ORAL ONCE AS NEEDED
Status: DISCONTINUED | OUTPATIENT
Start: 2024-10-14 | End: 2024-10-14

## 2024-10-14 RX ORDER — NALOXONE HYDROCHLORIDE 0.4 MG/ML
0.08 INJECTION, SOLUTION INTRAMUSCULAR; INTRAVENOUS; SUBCUTANEOUS AS NEEDED
Status: DISCONTINUED | OUTPATIENT
Start: 2024-10-14 | End: 2024-10-14

## 2024-10-14 RX ORDER — MEPERIDINE HYDROCHLORIDE 25 MG/ML
12.5 INJECTION INTRAMUSCULAR; INTRAVENOUS; SUBCUTANEOUS AS NEEDED
Status: DISCONTINUED | OUTPATIENT
Start: 2024-10-14 | End: 2024-10-14

## 2024-10-14 RX ORDER — DEXAMETHASONE SODIUM PHOSPHATE 4 MG/ML
VIAL (ML) INJECTION AS NEEDED
Status: DISCONTINUED | OUTPATIENT
Start: 2024-10-14 | End: 2024-10-14 | Stop reason: SURG

## 2024-10-14 RX ORDER — ACETAMINOPHEN 500 MG
1000 TABLET ORAL ONCE
Status: DISCONTINUED | OUTPATIENT
Start: 2024-10-14 | End: 2024-10-14 | Stop reason: HOSPADM

## 2024-10-14 RX ORDER — ONDANSETRON 2 MG/ML
4 INJECTION INTRAMUSCULAR; INTRAVENOUS EVERY 6 HOURS PRN
Status: DISCONTINUED | OUTPATIENT
Start: 2024-10-14 | End: 2024-10-14

## 2024-10-14 RX ORDER — BUPIVACAINE HYDROCHLORIDE AND EPINEPHRINE 5; 5 MG/ML; UG/ML
INJECTION, SOLUTION EPIDURAL; INTRACAUDAL; PERINEURAL AS NEEDED
Status: DISCONTINUED | OUTPATIENT
Start: 2024-10-14 | End: 2024-10-14 | Stop reason: HOSPADM

## 2024-10-14 RX ORDER — LIDOCAINE HYDROCHLORIDE 10 MG/ML
INJECTION, SOLUTION EPIDURAL; INFILTRATION; INTRACAUDAL; PERINEURAL AS NEEDED
Status: DISCONTINUED | OUTPATIENT
Start: 2024-10-14 | End: 2024-10-14 | Stop reason: SURG

## 2024-10-14 RX ORDER — HYDROCODONE BITARTRATE AND ACETAMINOPHEN 10; 325 MG/1; MG/1
2 TABLET ORAL ONCE AS NEEDED
Status: DISCONTINUED | OUTPATIENT
Start: 2024-10-14 | End: 2024-10-14

## 2024-10-14 RX ORDER — HYDROMORPHONE HYDROCHLORIDE 1 MG/ML
0.4 INJECTION, SOLUTION INTRAMUSCULAR; INTRAVENOUS; SUBCUTANEOUS EVERY 5 MIN PRN
Status: DISCONTINUED | OUTPATIENT
Start: 2024-10-14 | End: 2024-10-14

## 2024-10-14 RX ORDER — PHENYLEPHRINE HCL 10 MG/ML
VIAL (ML) INJECTION AS NEEDED
Status: DISCONTINUED | OUTPATIENT
Start: 2024-10-14 | End: 2024-10-14 | Stop reason: SURG

## 2024-10-14 RX ORDER — HYDROMORPHONE HYDROCHLORIDE 1 MG/ML
0.6 INJECTION, SOLUTION INTRAMUSCULAR; INTRAVENOUS; SUBCUTANEOUS EVERY 5 MIN PRN
Status: DISCONTINUED | OUTPATIENT
Start: 2024-10-14 | End: 2024-10-14

## 2024-10-14 RX ORDER — PROCHLORPERAZINE EDISYLATE 5 MG/ML
5 INJECTION INTRAMUSCULAR; INTRAVENOUS EVERY 8 HOURS PRN
Status: DISCONTINUED | OUTPATIENT
Start: 2024-10-14 | End: 2024-10-14

## 2024-10-14 RX ORDER — HYDROCODONE BITARTRATE AND ACETAMINOPHEN 10; 325 MG/1; MG/1
1 TABLET ORAL ONCE AS NEEDED
Status: DISCONTINUED | OUTPATIENT
Start: 2024-10-14 | End: 2024-10-14

## 2024-10-14 RX ADMIN — ONDANSETRON 4 MG: 2 INJECTION INTRAMUSCULAR; INTRAVENOUS at 12:37:00

## 2024-10-14 RX ADMIN — PHENYLEPHRINE HCL 50 MCG: 10 MG/ML VIAL (ML) INJECTION at 12:30:00

## 2024-10-14 RX ADMIN — LIDOCAINE HYDROCHLORIDE 50 MG: 10 INJECTION, SOLUTION EPIDURAL; INFILTRATION; INTRACAUDAL; PERINEURAL at 12:15:00

## 2024-10-14 RX ADMIN — DEXAMETHASONE SODIUM PHOSPHATE 8 MG: 4 MG/ML VIAL (ML) INJECTION at 12:24:00

## 2024-10-14 RX ADMIN — SODIUM CHLORIDE, SODIUM LACTATE, POTASSIUM CHLORIDE, CALCIUM CHLORIDE: 600; 310; 30; 20 INJECTION, SOLUTION INTRAVENOUS at 13:08:00

## 2024-10-14 RX ADMIN — PHENYLEPHRINE HCL 100 MCG: 10 MG/ML VIAL (ML) INJECTION at 12:43:00

## 2024-10-14 RX ADMIN — PHENYLEPHRINE HCL 100 MCG: 10 MG/ML VIAL (ML) INJECTION at 12:34:00

## 2024-10-14 NOTE — ANESTHESIA PREPROCEDURE EVALUATION
PRE-OP EVALUATION    Patient Name: Bill Raygoza    Admit Diagnosis: Lipoma of torso [D17.1]    Pre-op Diagnosis: Lipoma of torso [D17.1]    EXCISION OF LEFT FLANK LIPOMA    Anesthesia Procedure: EXCISION OF LEFT FLANK LIPOMA (Left: Flank)    Surgeons and Role:     * Windy Nguyen MD - Primary    Pre-op vitals reviewed.        Body mass index is 30.81 kg/m².    Current medications reviewed.  Hospital Medications:   acetaminophen (Tylenol Extra Strength) tab 1,000 mg  1,000 mg Oral Once    scopolamine (Transderm-Scop) 1 MG/3DAYS patch 1 patch  1 patch Transdermal Once    lactated ringers infusion   Intravenous Continuous    ceFAZolin (Ancef) 2g in 10mL IV syringe premix  2 g Intravenous Once    lactated ringers infusion   Intravenous Continuous    lactated ringers IV bolus 500 mL  500 mL Intravenous Once PRN    atropine 0.1 MG/ML injection 0.5 mg  0.5 mg Intravenous PRN    naloxone (Narcan) 0.4 MG/ML injection 0.08 mg  0.08 mg Intravenous PRN    fentaNYL (Sublimaze) 50 mcg/mL injection 25 mcg  25 mcg Intravenous Q5 Min PRN    Or    fentaNYL (Sublimaze) 50 mcg/mL injection 50 mcg  50 mcg Intravenous Q5 Min PRN    HYDROmorphone (Dilaudid) 1 MG/ML injection 0.2 mg  0.2 mg Intravenous Q5 Min PRN    Or    HYDROmorphone (Dilaudid) 1 MG/ML injection 0.4 mg  0.4 mg Intravenous Q5 Min PRN    Or    HYDROmorphone (Dilaudid) 1 MG/ML injection 0.6 mg  0.6 mg Intravenous Q5 Min PRN    acetaminophen (Tylenol Extra Strength) tab 1,000 mg  1,000 mg Oral Once PRN    Or    HYDROcodone-acetaminophen (Norco)  MG per tab 1 tablet  1 tablet Oral Once PRN    Or    HYDROcodone-acetaminophen (Norco)  MG per tab 2 tablet  2 tablet Oral Once PRN    ondansetron (Zofran) 4 MG/2ML injection 4 mg  4 mg Intravenous Q6H PRN    prochlorperazine (Compazine) 10 MG/2ML injection 5 mg  5 mg Intravenous Q8H PRN    meperidine (Demerol) 25 MG/ML injection 12.5 mg  12.5 mg Intravenous PRN       Outpatient Medications:   Prescriptions Prior to  Admission[1]    Allergies: Patient has no known allergies.      Anesthesia Evaluation    Patient summary reviewed.    Anesthetic Complications  (-) history of anesthetic complications         GI/Hepatic/Renal                (+) liver disease                 Cardiovascular    Negative cardiovascular ROS.    Exercise tolerance: good     MET: >4      (+) hypertension and well controlled                                    Endo/Other                                  Pulmonary                           Neuro/Psych        (+) anxiety                      As per Epic:  Patient Active Problem List:     Clear cell carcinoma of right kidney (HCC)     Essential hypertension     Premature ejaculation     Noise-induced hearing loss of both ears     Sensory hearing loss, bilateral     Abnormal auditory perception, unspecified laterality     TITI (generalized anxiety disorder)     Lipoma of torso     Fatty liver     Lumbar radiculopathy     Colon cancer screening          Past Surgical History:   Procedure Laterality Date    Colonoscopy N/A 6/4/2024    Procedure: COLONOSCOPY with forcep polypectomy;  Surgeon: Windy Nguyen MD;  Location:  ENDOSCOPY    Nephrectomy Right 11/2020     Social History     Socioeconomic History    Marital status:    Tobacco Use    Smoking status: Never    Smokeless tobacco: Never   Vaping Use    Vaping status: Never Used   Substance and Sexual Activity    Alcohol use: Never    Drug use: Never   Other Topics Concern    Caffeine Concern Yes     Comment: tea daily    Exercise Yes     Comment: PT at home     History   Drug Use Unknown     Available pre-op labs reviewed.  Lab Results   Component Value Date    WBC 8.9 07/18/2024    RBC 5.57 07/18/2024    HGB 16.7 07/18/2024    HCT 50.0 07/18/2024    MCV 89.8 07/18/2024    MCH 30.0 07/18/2024    MCHC 33.4 07/18/2024    RDW 12.9 07/18/2024    .0 07/18/2024     Lab Results   Component Value Date     09/26/2024    K 4.7 09/26/2024      2024    CO2 30.0 2024    BUN 13 2024    CREATSERUM 1.24 2024     (H) 2024    CA 9.7 2024            Airway      Mallampati: II  Mouth opening: >3 FB  TM distance: > 6 cm  Neck ROM: full Cardiovascular      Rhythm: regular  Rate: normal  (-) murmur   Dental  Comment: Dentition is grossly intact;  Patient does not demonstrate loose teeth to inspection.           Pulmonary  Comment: Unlabored ventilatory effort, no retractions.  Pulmonary exam normal.  Breath sounds clear to auscultation bilaterally.               Other findings              ASA: 2   Plan: general  NPO status verified and patient meets guidelines.        Comment: I explained intrinsic risks of general anesthesia, including nausea, dental damage, sore throat, mouth injury,and hoarseness from airway management.  All questions were answered and understanding was demonstrated of risks.  Informed permission was obtained to proceed as documented in the signed consent form.     Plan/risks discussed with: patient and spouse                Present on Admission:  **None**             [1]   Medications Prior to Admission   Medication Sig Dispense Refill Last Dose/Taking    PARoxetine 20 MG Oral Tab Take 1 tablet (20 mg total) by mouth every morning. 90 tablet 0 Taking    [] ergocalciferol 1.25 MG (82696 UT) Oral Cap Take 1 capsule (50,000 Units total) by mouth once a week. 8 capsule 0 Taking    Tadalafil (CIALIS) 20 MG Oral Tab Take 1 tablet (20 mg total) by mouth daily as needed for Erectile Dysfunction. 30 tablet 1 Taking As Needed    clobetasol 0.05 % External Cream Apply 1 Application topically 2 (two) times daily. 60 g 1 Taking    aspirin 325 MG Oral Tab Take 1 tablet (325 mg total) by mouth daily.   Taking    Multiple Vitamin (ONE-DAILY MULTI VITAMINS) Oral Tab Take 1 tablet by mouth daily.   Taking

## 2024-10-14 NOTE — DISCHARGE INSTRUCTIONS
Mass Excision  Dr. Windy Nguyen    MEDICATIONS  For post-operative pain control, the medications are usually Tylenol and ibuprofen.  You can take tylenol 1g three times a day and ibuprofen 400-600mg in between up to 4 times a day as needed for pain as well.  Please ask your surgeon before resuming blood thinners such as aspirin, Plavix or Coumadin.  All other home medications may be resumed as scheduled.     DIET  The patient may resume a general diet immediately.  This is not a good time to eat excessively.  The patient should eat in moderation and stick with foods the patient feels are easy to digest. There should be no alcohol consumption in the immediate recovery time period or within six hours of taking narcotics.    WOUND CARE  The dressing is usually a dissolvable glue which protects the wound. The patient can take a shower starting the night of surgery, but please, no baths or soaking. Please do not put any creams or ointments on the surgical incisions. If the patient does have a top dressing with clear tape and gauze, this dressing may be removed in 2 days. Do not remove the steri-strips or butterfly tapes that are white and adherent to the skin.  The steri-strips will eventually peel up at the ends and at this point they may be removed.  This is usually seven to ten days after surgery.  When showering, soap can get on the wounds but do not scrub over the wounds.  Avoid tub baths, swimming or sitting in a hot tub for two weeks.  If visible sutures or staples are present they will be removed in the office by the surgeon or nurse.  Most wounds will be closed with dissolving suture underneath the skin.  These sutures will dissolve on their own.  If a drain is present make sure the patient receives drain care instructions from the nurse prior to discharge.  Most patients will not have a drain.    ACTIVITY  Activity is usually as tolerated. Nothing called work or exercise until the follow up visit.  No  ‘stair-master’, power walking, jogging or workouts until the follow up visit.  Patients should seek further activity limits at the time of their appointment.    APPOINTMENT  Please call our office for an appointment within five to ten days of discharge.  Any fever greater than 100.5, chills, nausea, vomiting, or severe diarrhea please call our office.  If the wound turns red, hot, swollen, becomes increasingly painful, or drains pus call us immediately at [phone].  For life threatening emergencies call 911.  For non-emergent care please call our office after 8:30 a.m. Monday through Friday.  The number listed above is our office number; our phone automatically switches to our answering service if we are not there.  Please do not use the emergency room for non-urgent care.    Thank you for entrusting us with your care.  [facility]--General Surgery

## 2024-10-14 NOTE — ANESTHESIA PROCEDURE NOTES
Airway  Date/Time: 10/14/2024 12:16 PM  Urgency: elective      General Information and Staff    Patient location during procedure: OR  Anesthesiologist: Isaac Jeter MD  Resident/CRNA: Opal Aguero CRNA  Performed: CRNA   Performed by: Opal Aguero CRNA  Authorized by: Isaac Jeter MD      Indications and Patient Condition  Indications for airway management: anesthesia  Sedation level: deep  Preoxygenated: yes  Patient position: sniffing  Mask difficulty assessment: 1 - vent by mask    Final Airway Details  Final airway type: supraglottic airway      Successful airway: ProSeal  Size 4       Number of attempts at approach: 1    Additional Comments  Lips, dentition, oral mucosa as per preop       Patient will be out of medication before the next appointment on 1-8-18.

## 2024-10-14 NOTE — OPERATIVE REPORT
OPERATIVE NOTE    [facility] Location: OR   [facility] [id number] MRN [id number]   Admission Date 10/14/2024 Operation Date 10/14/2024   Attending Physician Windy Nguyen MD Operating Physician Windy Nguyen MD     PREOPERATIVE DIAGNOSIS  Left flank lipoma    POSTOPERATIVE DIAGNOSIS  Same    PROCEDURE PERFORMED  Excision of left flank lipoma    SURGEON  Windy Nguyen MD    ASSISTANTS  Katherine Cantu, PA-S    ANESTHESIA  General And local    INDICATIONS  This is a 58-year-old gentleman who presented to the outpatient setting with a mass of his left flank.  Patient endorsed discomfort.  Excision was indicated.    FINDINGS   7 x 4.5 x 1.5 cm mass consistent with lipoma    FINDINGS/DESCRIPTION OF PROCEDURE  After informed consent, patient was brought to the operating room and placed in supine position. Bilateral SCDs were placed and pre-operative antibiotics administered. Anesthesia was induced without any complication. Patient was prepped and draped in the usual sterile fashion. Time out was performed confirming patient, procedure, and site.    Proposed line of incision was made.  Local was infiltrated in the tissues over the mass.  Using a 15 blade, incision was made.  Using blunt dissection, the mass was circumferentially dissected until it was completely freed from the surrounding tissues.  Mass was consistent with a lipoma was approximately 7 x 4.5 x 1.5 cm.  Mass was sent to pathology as left flank lipoma mass.  Wound was irrigated and hemostasis achieved.  Wound defect was approximately 5 x 3 cm and closed in layers.  A 2-0 Vicryl suture was placed in the deep layers to obliterate the dead space.  Next was 3-0 Vicryl deep dermals to approximate the skin and finally 4-0 Monocryl running subcuticular to close the skin.  Wound was cleaned and dressed with Dermabond.      At the end of the case, all counts were correct. Patient tolerated procedure well. Patient was awakened and transferred to PACU in a  hemodynamically stable condition.    SPECIMENS REMOVED  Left flank lipoma    ESTIMATED BLOOD LOSS  2 ml    COMPLICATIONS  None     Windy Nguyen MD

## 2024-10-14 NOTE — H&P
[facility]  Report of Surgical History and Physical Exam    Bill Raygoza Patient Status:  Hospital Outpatient Surgery    1965 MRN [id number]   Location [facility] PERIOPERATIVE Attending Windy Nguyen MD   Hosp Day # 0 PCP Michael Crabtree MD     Chief Complaint: Left flank lipoma    History of Present Illness:  Bill Raygoza is a a(n) 58 year old male who presented to the outpatient setting with a left flank lipoma.  He presents today for elective excision.  He denies any symptoms since last clinic visit.      History:  Past Medical History:    Back problem    Cancer (HCC)    CLEAR CELL RENAL CARCINOMA    Carcinoid tumor of kidney (HCC)    Essential hypertension    Hearing impaired person, bilateral    HEARING AIDS BILATERAL    Renal disorder    CKD. HX OF NEPHRECTOMY, FOLLOWS YEARLY WITH UROLOGY       Past Surgical History:   Procedure Laterality Date    Colonoscopy N/A 2024    Procedure: COLONOSCOPY with forcep polypectomy;  Surgeon: Windy Nguyen MD;  Location:  ENDOSCOPY    Nephrectomy Right 2020       Family History   Problem Relation Age of Onset    Cancer Father         prostate    Heart Disease Father     Diabetes Mother     Arthritis Mother         reports that he has never smoked. He has never used smokeless tobacco. He reports that he does not drink alcohol and does not use drugs.      Allergies:  Allergies[1]      Medications:  No current facility-administered medications for this encounter.      Review of Systems:  The review of systems was negative other than the above listed HPI and past medical history including the HEENT, Heart, Lungs, GI, , Neuro, Musculoskeletal, Hematologic, Endocrine and Psych.       Physical Exam:  Height 74\", weight 240 lb (108.9 kg).  General: Alert, orientated x3.  Cooperative.  No apparent distress.  HEENT: Exam is unremarkable.  Without scleral icterus.  Mucous membranes are moist. EOM are WNL.  Neck: No tenderness to palpitation.   Full range of motion to flexion and extension, lateral rotation and lateral flexion of cervical spine.  No JVD. Supple.   Lungs: Bilateral chest rise. Good excursion of the diaphragms. No secondary use of accessory respiratory musculature.  Cardiac: Regular rate and rhythm. No murmur.  Abdomen: Soft, nontender, nondistended, approximately 4 x 6 cm mass over the left flank consistent with lipoma  Extremities:  No lower extremity edema noted.  Without clubbing or cyanosis.  2+ pulses x4  Skin: Normal texture and turgor.      Laboratory Data and Relevant Imaging:  No results for input(s): \"RBC\", \"HGB\", \"HCT\", \"MCV\", \"MCH\", \"MCHC\", \"RDW\", \"NEPRELIM\", \"WBC\", \"PLT\" in the last 168 hours.    No results for input(s): \"GLU\", \"BUN\", \"CREATSERUM\", \"GFRAA\", \"GFRNAA\", \"CA\", \"ALB\", \"NA\", \"K\", \"CL\", \"CO2\", \"ALKPHO\", \"AST\", \"ALT\", \"BILT\", \"TP\" in the last 168 hours.      No results for input(s): \"PTP\", \"INR\", \"PTT\" in the last 168 hours.    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)    Essential hypertension    Premature ejaculation    Noise-induced hearing loss of both ears    Sensory hearing loss, bilateral    Abnormal auditory perception, unspecified laterality    TITI (generalized anxiety disorder)    Lipoma of torso    Fatty liver    Lumbar radiculopathy    Colon cancer screening       58 year old male with left flank lipoma    Will proceed as scheduled  All questions answered  Patient will follow-up in 10 days    Thank you for letting me participate in the care of this patient    Windy Nguyen MD  [facility] General Surgery  10/14/2024  11:28 AM           [1] No Known Allergies

## 2024-10-14 NOTE — ANESTHESIA POSTPROCEDURE EVALUATION
[facility] Patient Status:  Hospital Outpatient Surgery   Age/Gender 58 year old male MRN [id number]   Location [facility] SURGERY Attending Windy Nguyen MD   Hosp Day # 0 PCP Michael Crabtree MD       Anesthesia Post-op Note    EXCISION OF LEFT FLANK LIPOMA    Procedure Summary       Date: 10/14/24 Room / Location:  MAIN OR 04 / [facility] MAIN OR    Anesthesia Start: 1211 Anesthesia Stop: 1314    Procedure: EXCISION OF LEFT FLANK LIPOMA (Left: Flank) Diagnosis:       Lipoma of torso      (Lipoma of torso [D17.1])    Surgeons: Windy Nguyen MD Anesthesiologist: Isaac Jeter MD    Anesthesia Type: general ASA Status: 2            Anesthesia Type: general    Vitals Value Taken Time   /74 10/14/24 1314   Temp 36.2 10/14/24 1314   Pulse 80 10/14/24 1314   Resp 16 10/14/24 1314   SpO2 97 10/14/24 1314       Patient Location: PACU    Anesthesia Type: general    Airway Patency: patent    Postop Pain Control: adequate    Mental Status: preanesthetic baseline    Nausea/Vomiting: none    Cardiopulmonary/Hydration status: stable euvolemic    Complications: no apparent anesthesia related complications    Postop vital signs: stable    Dental Exam: Unchanged from Preop    Patient to be discharged from PACU when criteria met.

## 2024-10-21 ENCOUNTER — OFFICE VISIT (OUTPATIENT)
Facility: LOCATION | Age: 59
End: 2024-10-21
Payer: MEDICAID

## 2024-10-21 VITALS
HEIGHT: 74 IN | OXYGEN SATURATION: 98 % | SYSTOLIC BLOOD PRESSURE: 138 MMHG | DIASTOLIC BLOOD PRESSURE: 91 MMHG | HEART RATE: 89 BPM | WEIGHT: 240 LBS | BODY MASS INDEX: 30.8 KG/M2 | TEMPERATURE: 98 F

## 2024-10-21 DIAGNOSIS — Z98.890 POSTOPERATIVE STATE: Primary | ICD-10-CM

## 2024-10-21 DIAGNOSIS — D17.1 LIPOMA OF TORSO: ICD-10-CM

## 2024-10-21 PROCEDURE — 99024 POSTOP FOLLOW-UP VISIT: CPT

## 2024-10-21 NOTE — PROGRESS NOTES
Post Operative Visit Note       Active Problems  1. Postoperative state    2. Lipoma of torso         Chief Complaint   Chief Complaint   Patient presents with    Post-Op     PO-10/14 w/Dr. Nguyen, EXCISION OF LEFT FLANK LIPOMA, itching on the incision, no there symptoms.          History of Present Illness   The patient presents for continued care and evaluation following excision of left flank lipoma by Dr. Nguyen on 10/14/2024.    Patient reports he is doing well since surgery. Patient states he has not had any pain and is not using and pain medication. Patient denies fever, chills or drainage from incisions. Patient does report itching at incision site.     Patient wants to know when he can start lifting weights and swimming again.       Allergies  Bill has No Known Allergies.    Past Medical / Surgical / Social / Family History    The past medical and past surgical history have been reviewed by me today.     Past Medical History:    Back problem    Cancer (HCC)    CLEAR CELL RENAL CARCINOMA    Carcinoid tumor of kidney (HCC)    Essential hypertension    Hearing impaired person, bilateral    HEARING AIDS BILATERAL    Renal disorder    CKD. HX OF NEPHRECTOMY, FOLLOWS YEARLY WITH UROLOGY     Past Surgical History:   Procedure Laterality Date    Colonoscopy N/A 06/04/2024    Procedure: COLONOSCOPY with forcep polypectomy;  Surgeon: Windy Nguyen MD;  Location:  ENDOSCOPY    Nephrectomy Right 11/2020    Other surgical history  2107    Kidney cancer       The family history and social history have been reviewed by me today.    Family History   Problem Relation Age of Onset    Cancer Father         prostate    Heart Disease Father     Diabetes Mother     Arthritis Mother      Social History     Socioeconomic History    Marital status:    Tobacco Use    Smoking status: Never    Smokeless tobacco: Never   Vaping Use    Vaping status: Never Used   Substance and Sexual Activity    Alcohol use:  Never    Drug use: Never   Other Topics Concern    Caffeine Concern No    Exercise Yes    Seat Belt Yes    Special Diet No    Stress Concern No    Weight Concern No        Current Outpatient Medications:     PARoxetine 20 MG Oral Tab, Take 1 tablet (20 mg total) by mouth every morning., Disp: 90 tablet, Rfl: 0    Tadalafil (CIALIS) 20 MG Oral Tab, Take 1 tablet (20 mg total) by mouth daily as needed for Erectile Dysfunction., Disp: 30 tablet, Rfl: 1    clobetasol 0.05 % External Cream, Apply 1 Application topically 2 (two) times daily., Disp: 60 g, Rfl: 1    aspirin 325 MG Oral Tab, Take 1 tablet (325 mg total) by mouth daily., Disp: , Rfl:     Multiple Vitamin (ONE-DAILY MULTI VITAMINS) Oral Tab, Take 1 tablet by mouth daily., Disp: , Rfl:       Review of Systems  The Review of Systems has been reviewed by me during today.  Review of Systems    Physical Findings   BP (!) 138/91 (BP Location: Right arm, Patient Position: Sitting, Cuff Size: adult)   Pulse 89   Temp 98 °F (36.7 °C) (Temporal)   Ht 74\"   Wt 240 lb (108.9 kg)   SpO2 98%   BMI 30.81 kg/m²   Physical Exam  Constitutional:       General: He is not in acute distress.     Appearance: He is not toxic-appearing.   Cardiovascular:      Rate and Rhythm: Normal rate and regular rhythm.      Heart sounds: Normal heart sounds. No murmur heard.  Pulmonary:      Effort: No respiratory distress.      Breath sounds: Normal breath sounds. No wheezing.   Abdominal:      General: There is no distension.      Palpations: Abdomen is soft.      Tenderness: There is no abdominal tenderness. There is no guarding.      Comments: Incision: Left flank incision is clean, dry and intact. No erythema or drainage present.   Skin:     General: Skin is warm and dry.   Neurological:      Mental Status: He is alert.             Assessment   1. Postoperative state    2. Lipoma of torso          The patient presents for continued care and evaluation following excision of left flank  lipoma by Dr. Nguyen on 10/14/2024.    Plan   Patient is doing well post operatively.  Reviewed patient's questions regarding exercise and swimming. Recommended patient refrain from lifting weight, excessive bending or swimming/submerging incision in water for one more week.  Alternate tylenol and ibuprofen as needed for pain.  Patient is okay to resume a general diet.  Pathology was discussed with patient during today's visit.  No further follow up is needed. Encouraged patient to contact our office with any questions.     Margaret Awadalla, PA-S         No orders of the defined types were placed in this encounter.      Imaging & Referrals   None    Follow Up  No follow-ups on file.    The patient was seen and examined with Maggie Awadalla, PA-S.  I agree with the above assessment and plan.      Mila Casey PA-C

## 2024-10-23 NOTE — PROGRESS NOTES
Is This A New Presentation, Or A Follow-Up?: Skin Lesions Patient ID: Bill Raygoza is a 58 year old male.    Chief Complaint   Patient presents with    [facility]     EP - Lipoma of back area, no symptoms.       HPI: Bill Raygoza is a 58 year old male presents for follow-up.  Patient reports having a lipoma of his back for a while.  He reports some discomfort at the site.  He is interested in surgical options.    Workup: None      Past Medical History  Past Medical History:    Carcinoid tumor of kidney (HCC)    Essential hypertension       Past Surgical History  Past Surgical History:   Procedure Laterality Date    Nephrectomy Right 11/2020       Medications  Current Outpatient Medications   Medication Sig Dispense Refill    Tadalafil (CIALIS) 20 MG Oral Tab Take 1 tablet (20 mg total) by mouth daily as needed for Erectile Dysfunction. 30 tablet 1    PARoxetine 20 MG Oral Tab Take 1 tablet (20 mg total) by mouth every morning. 90 tablet 0    PEG 3350-KCl-Na Bicarb-NaCl (TRILYTE) 420 g Oral Recon Soln Starting at 4:00 pm the night before procedure, drink 8 ounces of the prep every 15-20 minutes until finished 1 each 0    clobetasol 0.05 % External Cream Apply 1 Application topically 2 (two) times daily. 60 g 1    aspirin 325 MG Oral Tab Take 1 tablet (325 mg total) by mouth daily.      Multiple Vitamin (ONE-DAILY MULTI VITAMINS) Oral Tab Take 1 tablet by mouth daily.         Allergies  No Known Allergies    Social History  History   Smoking Status    Never   Smokeless Tobacco    Never     History   Alcohol Use Never     History   Drug Use Unknown       Family History  Family History   Problem Relation Age of Onset    Cancer Father         prostate    Heart Disease Father     Diabetes Mother     Arthritis Mother        Review of Systems  Review of Systems   Constitutional: Negative.    Respiratory: Negative.     Cardiovascular: Negative.    Gastrointestinal: Negative.        Exam  Vitals:    05/22/24 1348   Pulse: 77   Temp: 97.3 °F (36.3 °C)     Physical  Exam  Constitutional:       Appearance: Normal appearance.   Cardiovascular:      Rate and Rhythm: Normal rate.   Pulmonary:      Effort: Pulmonary effort is normal.   Musculoskeletal:         General: Normal range of motion.   Skin:     General: Skin is warm and dry.          Neurological:      Mental Status: He is alert and oriented to person, place, and time.           Assessment/Plan  Assessment   Problem List Items Addressed This Visit          Hematology and Neoplasia    Lipoma of torso - Primary       Bill Raygoza is a 58 year old male lipoma of his back    On physical exam, mass is quite large and soft, consistent with a lipoma  I recommend that we proceed with excision under anesthesia  Procedure explained to the patient  Risks including bleeding, pain, infection, and seroma formation all discussed  Patient acknowledged understanding and wished to proceed    Patient is also scheduled for colonoscopy in June      Windy Nguyen MD  General Surgery  [facility]     CC:  Michael Crabtree MD

## 2024-10-29 DIAGNOSIS — F41.1 GAD (GENERALIZED ANXIETY DISORDER): ICD-10-CM

## 2024-10-30 RX ORDER — PAROXETINE 20 MG/1
20 TABLET, FILM COATED ORAL EVERY MORNING
Qty: 90 TABLET | Refills: 0 | Status: SHIPPED | OUTPATIENT
Start: 2024-10-30

## 2024-10-30 NOTE — TELEPHONE ENCOUNTER
Requesting Paroxetine 20mg  LOV: 7/18/24  RTC: 6 months  Last Relevant Labs:   Filled: 7/31/24 #90 with 0 refills    Future Appointments   Date Time Provider Department Center   10/7/2025  2:00 PM Aravind Apodaca MD [id number] [address]     Psychiatric Non-Scheduled (Anti-Anxiety) Ejuaez72/29/2024 05:08 PM   Protocol Details   In person appointment or virtual visit in the past 6 mos or appointment in next 3 mos    Depression Screening completed within the past 12 months     Rx sent to pharmacy per protocol

## 2024-11-13 ENCOUNTER — OFFICE VISIT (OUTPATIENT)
Dept: FAMILY MEDICINE CLINIC | Facility: CLINIC | Age: 59
End: 2024-11-13
Payer: MEDICAID

## 2024-11-13 VITALS
HEART RATE: 84 BPM | WEIGHT: 241 LBS | OXYGEN SATURATION: 99 % | HEIGHT: 74 IN | RESPIRATION RATE: 16 BRPM | SYSTOLIC BLOOD PRESSURE: 134 MMHG | TEMPERATURE: 98 F | BODY MASS INDEX: 30.93 KG/M2 | DIASTOLIC BLOOD PRESSURE: 94 MMHG

## 2024-11-13 DIAGNOSIS — E66.9 OBESITY (BMI 30-39.9): ICD-10-CM

## 2024-11-13 DIAGNOSIS — R06.83 SNORING: ICD-10-CM

## 2024-11-13 DIAGNOSIS — I10 ESSENTIAL HYPERTENSION: Primary | ICD-10-CM

## 2024-11-13 PROBLEM — N18.31 STAGE 3A CHRONIC KIDNEY DISEASE (HCC): Status: ACTIVE | Noted: 2024-11-13

## 2024-11-13 PROCEDURE — 90656 IIV3 VACC NO PRSV 0.5 ML IM: CPT | Performed by: FAMILY MEDICINE

## 2024-11-13 PROCEDURE — 99215 OFFICE O/P EST HI 40 MIN: CPT | Performed by: FAMILY MEDICINE

## 2024-11-13 PROCEDURE — 90471 IMMUNIZATION ADMIN: CPT | Performed by: FAMILY MEDICINE

## 2024-11-13 RX ORDER — HYDROCHLOROTHIAZIDE 25 MG/1
25 TABLET ORAL DAILY
Qty: 90 TABLET | Refills: 1 | Status: SHIPPED | OUTPATIENT
Start: 2024-11-13

## 2024-11-13 NOTE — PROGRESS NOTES
Subjective:   Patient ID: Bill Raygoza is a 58 year old male.    HPI  Mr. Raygoza is a very pleasant 58-year-old gentleman with known history of clear-cell carcinoma of the right kidney status post nephrectomy, hypertension, anxiety, chronic kidney disease 3A, arthritis here today for his follow-up appointment.  He is following up initially for elevated blood pressure.  In the past he had taken hydrochlorothiazide but blood pressure seem to have been within normal limits at that time.  Wife is with him today and reports that he had salt to his diet.  He has not been exercising as much.  He feels tired.  Upon further inquiry of being tired he snores and stops breathing at nighttime.    No fever no cough no chest pain or shortness of breath no nausea no vomiting no abdominal pain.    I had reviewed past medical and family histories together with allergy and medication lists documented.        History/Other:   Review of Systems  Constitutional:  Negative for fatigue, fever and unexpected weight change.   Respiratory:  Negative for cough and shortness of breath.    Cardiovascular:  Negative for chest pain and palpitations.   Gastrointestinal:  Negative for abdominal pain, constipation, diarrhea, nausea and vomiting.   Genitourinary:  Negative for difficulty urinating and hematuria.   Neurological:  Negative for weakness and numbness.      Current Outpatient Medications   Medication Sig Dispense Refill    hydroCHLOROthiazide 25 MG Oral Tab Take 1 tablet (25 mg total) by mouth daily. 90 tablet 1    PARoxetine 20 MG Oral Tab Take 1 tablet (20 mg total) by mouth every morning. 90 tablet 0    Tadalafil (CIALIS) 20 MG Oral Tab Take 1 tablet (20 mg total) by mouth daily as needed for Erectile Dysfunction. 30 tablet 1    clobetasol 0.05 % External Cream Apply 1 Application topically 2 (two) times daily. 60 g 1    aspirin 325 MG Oral Tab Take 1 tablet (325 mg total) by mouth daily.      Multiple Vitamin (ONE-DAILY MULTI VITAMINS)  Oral Tab Take 1 tablet by mouth daily.       Allergies:Allergies[1]    Objective:   Physical Exam  Vitals reviewed.   Constitutional:       General: He is not in acute distress.  HENT:      Mouth/Throat:      Mouth: Mucous membranes are moist.      Pharynx: Oropharynx is clear.   Eyes:      General: No scleral icterus.     Conjunctiva/sclera: Conjunctivae normal.   Cardiovascular:      Rate and Rhythm: Normal rate and regular rhythm.      Heart sounds: Normal heart sounds. No murmur heard.  Pulmonary:      Effort: Pulmonary effort is normal. No respiratory distress.      Breath sounds: Normal breath sounds. No wheezing or rales.   Abdominal:      General: Bowel sounds are normal.      Palpations: Abdomen is soft.   Musculoskeletal:      Cervical back: Neck supple.      Right lower leg: No edema.      Left lower leg: No edema.   Lymphadenopathy:      Cervical: No cervical adenopathy.   Skin:     General: Skin is warm.   Neurological:      Mental Status: He is alert.   Psychiatric:         Mood and Affect: Mood normal.         Behavior: Behavior normal.      Assessment & Plan:   1. Essential hypertension   -Discussed with him what hypertension is somewhat and what systolic blood pressure and diastolic blood pressures are  - Will start back on hydrochlorothiazide 25 mg daily  - Reduce salt in his diet  - Encouraged weight loss by lowering caloric intake daily and exercising   2. Snoring   -Encouraged weight loss  - Discuss etiology and process of sleep apnea and complications that could arise from it such as heart and lung problems  - Will order sleep study   3. Obesity (BMI 30-39.9)   -Please see #1 and #2 for recommendations     Follow-up in 4 weeks or as needed      This note was prepared using Dragon Medical voice recognition dictation software. As a result errors may occur. When identified these errors have been corrected. While every attempt is made to correct errors during dictation discrepancies may still  exist          No orders of the defined types were placed in this encounter.      Meds This Visit:  Requested Prescriptions     Signed Prescriptions Disp Refills    hydroCHLOROthiazide 25 MG Oral Tab 90 tablet 1     Sig: Take 1 tablet (25 mg total) by mouth daily.       Imaging & Referrals:  OP REFERRAL TO DIAGNOSTIC SLEEP STUDY         [1] No Known Allergies

## 2024-11-13 NOTE — PATIENT INSTRUCTIONS
Thank you for choosing Michael Crabtree MD at [facility]  To Do: Khan Arif  1. High Blood pressure  What Is a Normal Blood Pressure?  The Joint National Committee on Prevention, Detection, Evaluation, and Treatment of High Blood Pressure has classified blood pressure measurements into several categories:  Normal blood pressure is systolic pressure less than 120 and diastolic pressure less than 80 mmHg.   \"Prehypertension\" is systolic pressure of 120-139 or diastolic pressure of 80-89 mmHg.   Stage 1 Hypertension is blood pressure greater than systolic pressure of 140-159 or diastolic pressure of 90-99 mmHg or greater.   Stage 2 Hypertension is systolic pressure of 160 or greater or diastolic pressure of 100 or greater.  What Health Problems Are Associated With High Blood Pressure?  Several potentially serious health conditions are linked to high blood pressure, including:  Atherosclerosis: a disease of the arteries caused by a buildup of plaque, or fatty material, on the inside walls of the blood vessels; hypertension contributes to this buildup by putting added stress and force on the artery walls.   Heart Disease: Heart failure (the heart is not strong enough to pump blood adequately), ischemic heart disease (the heart tissue doesn't get enough blood), and hypertensive hypertrophic cardiomyopathy (thickened, abnormally functioning heart muscle) are all associated with high blood pressure.   Kidney Disease: Hypertension can damage the blood vessels and filters in the kidneys, so that the kidneys cannot excrete waste properly.   Stroke: Hypertension can lead to stroke, either by contributing to the process of atherosclerosis (which can lead to blockages and/or clots), or by weakening the blood vessel wall and causing it to rupture.   Eye Disease: Hypertension can damage the very small blood vessels in the retina.  Bleeding from the aorta, the large blood vessel that supplies blood to the abdomen,  pelvis, and legs   Heart failure   Poor blood supply to the legs  Erectile Dysfunction  Problems with your vision    Overview  \"Blood pressure\" is the force of blood pushing against the walls of the arteries as the heart pumps blood. If this pressure rises and stays high over time, it can damage the body in many ways.  About 1 in 3 adults in the [place] has HBP. The condition itself usually has no signs or symptoms. You can have it for years without knowing it. During this time, though, HBP can damage your heart, blood vessels, kidneys, and other parts of your body.  Knowing your blood pressure numbers is important, even when you're feeling fine. If your blood pressure is normal, you can work with your health care team to keep it that way. If your blood pressure is too high, treatment may help prevent damage to your body's organs.    By [facility] staff   DASH stands for Dietary Approaches to Stop Hypertension. The DASH diet is a lifelong approach to healthy eating that's designed to help treat or prevent high blood pressure (hypertension). The DASH diet encourages you to reduce the sodium in your diet and eat a variety of foods rich in nutrients that help lower blood pressure, such as potassium, calcium and magnesium.   By following the DASH diet, you may be able to reduce your blood pressure by a few points in just two weeks. Over time, your blood pressure could drop by eight to 14 points, which can make a significant difference in your health risks.   DASH DIET  The low-salt Dietary Approaches to Stop Hypertension (DASH) diet is proven to help lower blood pressure. Its effects on blood pressure are sometimes seen within a few weeks.  This diet is not only rich in important nutrients and fiber, but it also includes foods that contain far more potassium (4,700 milligrams (mg)/day), calcium (1,250 mg/day), and magnesium (500 mg/day) and much less sodium (salt) than the typical American diet.  Limit sodium to  no more than 2,300 mg a day (eating only 1,500 mg a day is an even better goal).   Reduce saturated fat to no more than 6% of daily calories and total fat to 27% of daily calories. Low-fat dairy products appear to be especially beneficial for lowering systolic blood pressure.   When choosing fats, select monounsaturated oils, such as olive or canola oils.   Choose whole grains over white flour or pasta products.   Choose fresh fruits and vegetables every day. Many of these foods are rich in potassium, fiber, or both.   Eat nuts, seeds, or legumes (dried beans or peas) daily.   Choose modest amounts of protein (no more than 18% of total daily calories). Fish, skinless poultry, and soy products are the best protein sources.   Other daily nutrient goals in the DASH diet include limiting carbohydrates to 55% of daily calories and dietary cholesterol to 150 mg. Try to get at least 30 grams (g) of daily fiber.    Grains (6 to 8 servings a day)  Grains include bread, cereal, rice and pasta. Examples of one serving of grains include 1 slice whole-wheat bread, 1 ounce (oz.) dry cereal, or 1/2 cup cooked cereal, rice or pasta.   Focus on whole grains because they have more fiber and nutrients than do refined grains. For instance, use brown rice instead of white rice, whole-wheat pasta instead of regular pasta and whole-grain bread instead of white bread. Look for products labeled \"100 percent whole grain\" or \"100 percent whole wheat.\"   Grains are naturally low in fat, so avoid spreading on butter or adding cream and cheese sauces.   Vegetables (4 to 5 servings a day)  Tomatoes, carrots, broccoli, sweet potatoes, greens and other vegetables are full of fiber, vitamins, and such minerals as potassium and magnesium. Examples of one serving include 1 cup raw leafy green vegetables or 1/2 cup cut-up raw or cooked vegetables.   Don't think of vegetables only as side dishes -- a hearty blend of vegetables served over brown rice or  whole-wheat noodles can serve as the main dish for a meal.   Fresh or frozen vegetables are both good choices. When buying frozen and canned vegetables, choose those labeled as low sodium or without added salt.   To increase the number of servings you fit in daily, be creative. In a stir-coyne, for instance, cut the amount of meat in half and double up on the vegetables.   Fruits (4 to 5 servings a day)  Many fruits need little preparation to become a healthy part of a meal or snack. Like vegetables, they're packed with fiber, potassium and magnesium and are typically low in fat -- exceptions include avocados and coconuts. Examples of one serving include 1 medium fruit or 1/2 cup fresh, frozen or canned fruit.   Have a piece of fruit with meals and one as a snack, then round out your day with a dessert of fresh fruits topped with a splash of low-fat yogurt.   Leave on edible peels whenever possible. The peels of apples, pears and most fruits with pits add interesting texture to recipes and contain healthy nutrients and fiber.   Remember that citrus fruits and juice, such as grapefruit, can interact with certain medications, so check with your doctor or pharmacist to see if they're OK for you.   Dairy (2 to 3 servings a day)  Milk, yogurt, cheese and other dairy products are major sources of calcium, vitamin D and protein. But the key is to make sure that you choose dairy products that are low-fat or fat-free because otherwise they can be a major source of fat. Examples of one serving include 1 cup skim or 1% milk, 1 cup yogurt or 1 1/2 oz. cheese.   Low-fat or fat-free frozen yogurt can help you boost the amount of dairy products you eat while offering a sweet treat. Add fruit for a healthy twist.   If you have trouble digesting dairy products, choose lactose-free products or consider taking an over-the-counter product that contains the enzyme lactase, which can reduce or prevent the symptoms of lactose intolerance.    Go easy on regular and even fat-free cheeses because they are typically high in sodium.   Lean meat, poultry and fish (6 or fewer servings a day)  Meat can be a rich source of protein, B vitamins, iron and zinc. But because even lean varieties contain fat and cholesterol, don't make them a mainstay of your diet -- cut back typical meat portions by one-third or one-half and pile on the vegetables instead. Examples of one serving include 1 oz. cooked skinless poultry, seafood or lean meat, 1 egg, or 1 oz. water-packed, no-salt-added canned tuna.   Trim away skin and fat from meat and then broil, grill, roast or poach instead of frying.   Eat heart-healthy fish, such as salmon, herring and tuna. These types of fish are high in omega-3 fatty acids, which can help lower your total cholesterol.   Nuts, seeds and legumes (4 to 5 servings a week)   Almonds, sunflower seeds, kidney beans, peas, lentils and other foods in this family are good sources of magnesium, potassium and protein. They're also full of fiber and phytochemicals, which are plant compounds that may protect against some cancers and cardiovascular disease. Serving sizes are small and are intended to be consumed weekly because these foods are high in calories. Examples of one serving include 1/3 cup (1 1/2 oz.) nuts, 2 tablespoons seeds or 1/2 cup cooked beans or peas.   Nuts sometimes get a bad rap because of their fat content, but they contain healthy types of fat -- monounsaturated fat and omega-3 fatty acids. They're high in calories, however, so eat them in moderation. Try adding them to stir-fries, salads or cereals.   Soybean-based products, such as tofu and tempeh, can be a good alternative to meat because they contain all of the amino acids your body needs to make a complete protein, just like meat. They also contain isoflavones, a type of natural plant compound (phytochemical) that has been shown to have some health benefits.   Fats and oils (2 to  3 servings a day)  Fat helps your body absorb essential vitamins and helps your body's immune system. But too much fat increases your risk of heart disease, diabetes and obesity. The DASH diet strives for a healthy balance by providing 30 percent or less of daily calories from fat, with a focus on the healthier unsaturated fats. Examples of one serving include 1 teaspoon soft margarine, 1 tablespoon low-fat mayonnaise or 2 tablespoons light salad dressing.   Saturated fat and trans fat are the main dietary culprits in raising your blood cholesterol and increasing your risk of coronary artery disease. DASH helps keep your daily saturated fat to less than 10 percent of your total calories by limiting use of meat, butter, cheese, whole milk, cream and eggs in your diet, along with foods made from lard, solid shortenings, and palm and coconut oils.   Avoid trans fat, commonly found in such processed foods as crackers, baked goods and fried items.   Read food labels on margarine and salad dressing so that you can choose those that are lowest in saturated fat and free of trans fat.   Sweets (5 or fewer a week)  You don't have to banish sweets entirely while following the DASH diet -- just go easy on them. Examples of one serving include 1 tablespoon sugar, jelly or jam, 1/2 cup sorbet or 1 cup (8 oz.) lemonade.   When you eat sweets, choose those that are fat-free or low-fat, such as sorbets, fruit ices, jelly beans, hard candy, tracy crackers or low-fat cookies.   Artificial sweeteners such as aspartame (NutraSweet, Equal) and sucralose (Splenda) may help satisfy your sweet tooth while sparing the sugar. But remember that you still must use them sensibly. It's OK to swap a diet cola for a regular cola, but not in place of a more nutritious beverage such as low-fat milk or even plain water.   Cut back on added sugar, which has no nutritional value but can pack on calories.   DASH diet: Alcohol and caffeine  Drinking too  much alcohol can increase blood pressure. The DASH diet recommends that men limit alcohol to two or fewer drinks a day and women one or less.   The DASH diet doesn't address caffeine consumption. The influence of caffeine on blood pressure remains unclear. But caffeine can cause your blood pressure to rise at least temporarily. If you already have high blood pressure or if you think caffeine is affecting your blood pressure, talk to your doctor about your caffeine consumption.   The DASH diet is not designed to promote weight loss, but it can be used as part of an overall weight-loss strategy. The DASH diet is based on a diet of about 2,000 calories a day. If you're trying to lose weight, though, you may want to eat around 1,600 a day. You may need to adjust your serving goals based on your health or individual circumstances -- something your health care team can help you decide.   Tips to cut back on sodium  The foods at the core of the DASH diet are naturally low in sodium. So just by following the DASH diet, you're likely to reduce your sodium intake. You also can cut back on sodium in your diet by:   Using sodium-free spices or flavorings with your food instead of salt   Not adding salt when cooking rice, pasta or hot cereal   Rinsing canned foods to remove some of the sodium   Buying foods labeled \"no salt added,\" \"sodium-free,\" \"low sodium\" or \"very low sodium\"   One teaspoon of table salt has about 2,300 mg of sodium, and 2/3 teaspoon of table salt has about 1,500 mg of sodium. When you read food labels, you may be surprised at just how much sodium some processed foods contain. Even low-fat soups, canned vegetables, ready-to-eat cereals and sliced turkey from the local deli -- all foods you may have considered healthy -- often have lots of sodium.   You may not notice a difference in taste when you choose low-sodium food and beverages. If things seem too bland, gradually introduce low-sodium foods and cut  back on table salt until you reach your sodium goal. That'll give your palate time to adjust. It can take several weeks for your taste buds to get used to less salty foods.      Call [phone] to schedule the appointment.   Please signup for [employer], which is electronic access to your record if you have not done so.  All your results will post on there.  [email or web address]   You can NOW use Solutionary to book your appointments with us, or consider using open access scheduling which is through the [place] website [email or web address] and type in Michael Crabtree MD and follow the links for \"Schedule Online Now\"    To schedule Imaging or tests at [place] call Central Scheduling [phone], Go to [facility] A ER Building (For example: CT scans, X rays, Ultrasound, MRI)  Cardiac Testing in ER building Building A second floor Cardiac Testing [phone] (For example: Holter Monitor, Cardiac Stress tests,Event Monitor, or 2D Echocardiograms)  [facility] call [phone] usually in [facility] A  Walk in Clinic in [place] at [address] Mon-Fri at 8am-7:30 p.m., and Sat/Sun 9:00a.m.-4:30 p.m.  Also at [address] [place] [address]  Call [phone] for info     Please call our office about any questions regarding your treatment/medicines/tests as a result of today's visit.  For your safety, read the entire package insert of all medicines prescribed to you and be aware of all of the risks of treatment even beyond those discussed today.  All therapies have potential risk of harm or side effects or medication interactions.  It is your duty and for your safety to discuss with the pharmacist and our office with questions, and to notify us and stop treatment if problems arise, but know that our intention is that the benefits outweigh those potential risks and we strive to make you healthier and to improve your quality of life.    Referrals, and Radiology Information:    If your insurance requires a  referral to a specialist, please allow 5 business days to process your referral request.    If Michael Crabtree MD orders a CT or MRI, it may take up to 10 business days to receive approval from your insurance company. Once our office has called informing you that the insurance company approved your testing, please call Central Scheduling at [phone]  Please allow our office 5 business days to contact you regarding any testing results.    Refill policies:   Allow 3 business days for refills; controlled substances may take longer and must be picked up from the office in person.  Narcotic medications can only be filled in 30 day increments and must be refilled at an office visit only.  If your prescription is due for a refill, you may be due for a follow-up appointment.  We cannot refill your maintenance medications at a preventative wellness visit.  To best provide you care, patients receiving maintenance medications need to be seen at least twice a year.

## 2025-04-08 ENCOUNTER — LAB ENCOUNTER (OUTPATIENT)
Dept: LAB | Age: 60
End: 2025-04-08
Attending: FAMILY MEDICINE
Payer: MEDICAID

## 2025-04-08 ENCOUNTER — OFFICE VISIT (OUTPATIENT)
Dept: FAMILY MEDICINE CLINIC | Facility: CLINIC | Age: 60
End: 2025-04-08
Payer: MEDICAID

## 2025-04-08 VITALS
TEMPERATURE: 98 F | DIASTOLIC BLOOD PRESSURE: 80 MMHG | OXYGEN SATURATION: 98 % | RESPIRATION RATE: 16 BRPM | BODY MASS INDEX: 31.32 KG/M2 | SYSTOLIC BLOOD PRESSURE: 132 MMHG | HEART RATE: 74 BPM | WEIGHT: 244 LBS | HEIGHT: 74 IN

## 2025-04-08 DIAGNOSIS — M25.561 CHRONIC PAIN OF RIGHT KNEE: ICD-10-CM

## 2025-04-08 DIAGNOSIS — N18.31 STAGE 3A CHRONIC KIDNEY DISEASE (HCC): ICD-10-CM

## 2025-04-08 DIAGNOSIS — Z00.00 WELLNESS EXAMINATION: ICD-10-CM

## 2025-04-08 DIAGNOSIS — G89.29 CHRONIC PAIN OF RIGHT KNEE: ICD-10-CM

## 2025-04-08 DIAGNOSIS — Z00.00 WELLNESS EXAMINATION: Primary | ICD-10-CM

## 2025-04-08 DIAGNOSIS — R73.09 ELEVATED GLUCOSE: ICD-10-CM

## 2025-04-08 LAB
ALBUMIN SERPL-MCNC: 4.7 G/DL (ref 3.2–4.8)
ALBUMIN/GLOB SERPL: 1.6 {RATIO} (ref 1–2)
ALP LIVER SERPL-CCNC: 100 U/L
ALT SERPL-CCNC: 37 U/L
ANION GAP SERPL CALC-SCNC: 8 MMOL/L (ref 0–18)
AST SERPL-CCNC: 27 U/L (ref ?–34)
BASOPHILS # BLD AUTO: 0.16 X10(3) UL (ref 0–0.2)
BASOPHILS NFR BLD AUTO: 1.6 %
BILIRUB SERPL-MCNC: 0.5 MG/DL (ref 0.2–1.1)
BILIRUB UR QL STRIP.AUTO: NEGATIVE
BUN BLD-MCNC: 20 MG/DL (ref 9–23)
CALCIUM BLD-MCNC: 10.1 MG/DL (ref 8.7–10.6)
CHLORIDE SERPL-SCNC: 100 MMOL/L (ref 98–112)
CHOLEST SERPL-MCNC: 161 MG/DL (ref ?–200)
CLARITY UR REFRACT.AUTO: CLEAR
CO2 SERPL-SCNC: 31 MMOL/L (ref 21–32)
COLOR UR AUTO: YELLOW
CREAT BLD-MCNC: 1.42 MG/DL
EGFRCR SERPLBLD CKD-EPI 2021: 57 ML/MIN/1.73M2 (ref 60–?)
EOSINOPHIL # BLD AUTO: 0.37 X10(3) UL (ref 0–0.7)
EOSINOPHIL NFR BLD AUTO: 3.6 %
ERYTHROCYTE [DISTWIDTH] IN BLOOD BY AUTOMATED COUNT: 13 %
FASTING PATIENT LIPID ANSWER: YES
FASTING STATUS PATIENT QL REPORTED: YES
GLOBULIN PLAS-MCNC: 3 G/DL (ref 2–3.5)
GLUCOSE BLD-MCNC: 141 MG/DL (ref 70–99)
GLUCOSE UR STRIP.AUTO-MCNC: NORMAL MG/DL
HCT VFR BLD AUTO: 50.4 %
HDLC SERPL-MCNC: 59 MG/DL (ref 40–59)
HGB BLD-MCNC: 16.9 G/DL
IMM GRANULOCYTES # BLD AUTO: 0.03 X10(3) UL (ref 0–1)
IMM GRANULOCYTES NFR BLD: 0.3 %
KETONES UR STRIP.AUTO-MCNC: NEGATIVE MG/DL
LDLC SERPL CALC-MCNC: 78 MG/DL (ref ?–100)
LEUKOCYTE ESTERASE UR QL STRIP.AUTO: NEGATIVE
LYMPHOCYTES # BLD AUTO: 3.68 X10(3) UL (ref 1–4)
LYMPHOCYTES NFR BLD AUTO: 35.7 %
MAGNESIUM SERPL-MCNC: 2 MG/DL (ref 1.6–2.6)
MCH RBC QN AUTO: 29.5 PG (ref 26–34)
MCHC RBC AUTO-ENTMCNC: 33.5 G/DL (ref 31–37)
MCV RBC AUTO: 88.1 FL
MONOCYTES # BLD AUTO: 0.78 X10(3) UL (ref 0.1–1)
MONOCYTES NFR BLD AUTO: 7.6 %
NEUTROPHILS # BLD AUTO: 5.29 X10 (3) UL (ref 1.5–7.7)
NEUTROPHILS # BLD AUTO: 5.29 X10(3) UL (ref 1.5–7.7)
NEUTROPHILS NFR BLD AUTO: 51.2 %
NITRITE UR QL STRIP.AUTO: NEGATIVE
NONHDLC SERPL-MCNC: 102 MG/DL (ref ?–130)
OSMOLALITY SERPL CALC.SUM OF ELEC: 293 MOSM/KG (ref 275–295)
PH UR STRIP.AUTO: 5.5 [PH] (ref 5–8)
PHOSPHATE SERPL-MCNC: 3.5 MG/DL (ref 2.4–5.1)
PLATELET # BLD AUTO: 343 10(3)UL (ref 150–450)
POTASSIUM SERPL-SCNC: 4 MMOL/L (ref 3.5–5.1)
PROT SERPL-MCNC: 7.7 G/DL (ref 5.7–8.2)
PROT UR STRIP.AUTO-MCNC: NEGATIVE MG/DL
RBC # BLD AUTO: 5.72 X10(6)UL
RBC UR QL AUTO: NEGATIVE
SODIUM SERPL-SCNC: 139 MMOL/L (ref 136–145)
SP GR UR STRIP.AUTO: 1.02 (ref 1–1.03)
T4 FREE SERPL-MCNC: 1.1 NG/DL (ref 0.8–1.7)
TRIGL SERPL-MCNC: 136 MG/DL (ref 30–149)
TSI SER-ACNC: 0.87 UIU/ML (ref 0.55–4.78)
UROBILINOGEN UR STRIP.AUTO-MCNC: NORMAL MG/DL
VIT D+METAB SERPL-MCNC: 35.9 NG/ML (ref 30–100)
VLDLC SERPL CALC-MCNC: 21 MG/DL (ref 0–30)
WBC # BLD AUTO: 10.3 X10(3) UL (ref 4–11)

## 2025-04-08 PROCEDURE — 84439 ASSAY OF FREE THYROXINE: CPT

## 2025-04-08 PROCEDURE — 83036 HEMOGLOBIN GLYCOSYLATED A1C: CPT

## 2025-04-08 PROCEDURE — 36415 COLL VENOUS BLD VENIPUNCTURE: CPT

## 2025-04-08 PROCEDURE — 85025 COMPLETE CBC W/AUTO DIFF WBC: CPT

## 2025-04-08 PROCEDURE — 80053 COMPREHEN METABOLIC PANEL: CPT

## 2025-04-08 PROCEDURE — 84100 ASSAY OF PHOSPHORUS: CPT

## 2025-04-08 PROCEDURE — 80061 LIPID PANEL: CPT

## 2025-04-08 PROCEDURE — 81003 URINALYSIS AUTO W/O SCOPE: CPT

## 2025-04-08 PROCEDURE — 83735 ASSAY OF MAGNESIUM: CPT

## 2025-04-08 PROCEDURE — 99396 PREV VISIT EST AGE 40-64: CPT | Performed by: FAMILY MEDICINE

## 2025-04-08 PROCEDURE — 84443 ASSAY THYROID STIM HORMONE: CPT

## 2025-04-08 PROCEDURE — 82306 VITAMIN D 25 HYDROXY: CPT

## 2025-04-08 NOTE — PATIENT INSTRUCTIONS
Thank you for choosing Michael Crabtree MD at [facility]  To Do: Bill Raygoza  1. Please see age appropriate health prevention below     Call [phone] to schedule the appointment.   Please signup for [employer], which is electronic access to your record if you have not done so.  All your results will post on there.  [email or web address]   You can NOW use Xipin to book your appointments with us, or consider using open access scheduling which is through the [employer] website [email or web address] and type in Michael Crabtree MD and follow the links for \"Schedule Online Now\"    To schedule Imaging or tests at [place] call Central Scheduling [phone], Go to [facility] A ER Building (For example: CT scans, X rays, Ultrasound, MRI)  Cardiac Testing in ER building Building A second floor Cardiac Testing [phone] (For example: Holter Monitor, Cardiac Stress tests,Event Monitor, or 2D Echocardiograms)  [facility] call [phone] usually in [facility] A  Walk in Clinic in [place] at [address] Mon-Fri at 8am-7:30 p.m., and Sat/Sun 9:00a.m.-4:30 p.m.  Also at [address] [place] [address]  Call [phone] for info     Please call our office about any questions regarding your treatment/medicines/tests as a result of today's visit.  For your safety, read the entire package insert of all medicines prescribed to you and be aware of all of the risks of treatment even beyond those discussed today.  All therapies have potential risk of harm or side effects or medication interactions.  It is your duty and for your safety to discuss with the pharmacist and our office with questions, and to notify us and stop treatment if problems arise, but know that our intention is that the benefits outweigh those potential risks and we strive to make you healthier and to improve your quality of life.    Referrals, and Radiology Information:    If your insurance requires a referral to a specialist, please  allow 5 business days to process your referral request.    If Michael Crabtree MD orders a CT or MRI, it may take up to 10 business days to receive approval from your insurance company. Once our office has called informing you that the insurance company approved your testing, please call Central Scheduling at [phone]  Please allow our office 5 business days to contact you regarding any testing results.    Refill policies:   Allow 3 business days for refills; controlled substances may take longer and must be picked up from the office in person.  Narcotic medications can only be filled in 30 day increments and must be refilled at an office visit only.  If your prescription is due for a refill, you may be due for a follow-up appointment.  We cannot refill your maintenance medications at a preventative wellness visit.  To best provide you care, patients receiving maintenance medications need to be seen at least twice a year.

## 2025-04-08 NOTE — PROGRESS NOTES
Wellness Exam    REASON FOR VISIT:    Bill Raygoza is a 59 year old male who presents for an Annual Health Assessment.    Current Complaints: Mr. Raygoza is here for his wellness exam  Flu Shot: see immunization record  Health Maintenance Topics with due status: Overdue       Topic Date Due    Pneumococcal Vaccine: 50+ Years Never done    DTaP,Tdap,and Td Vaccines Never done    Zoster Vaccines Never done    COVID-19 Vaccine 09/01/2024     Reported Health:   Mr. Raygoza is a very pleasant 59-year-old gentleman with history of right nephrectomy secondary to renal cell carcinoma of the right kidney, hypertension which is diet-controlled, anxiety, arthritis here for his wellness exam.  He just went to [place] and had left-sided back pain.  He is due to have his labs done.  He also has been experiencing bilateral knee pain more on the right than on the left.  He has had injections for his  knee in the past which had helped.  Details about the complaints:  N/A    General Health     How would you describe your current health state?: Good    Type of Diet: Balanced    How do you maintain positive mental well-being?: Social Interaction    How would you describe your daily physical activity?: Moderate    If you are a male age 45-79 or a female age 55-79, do you take aspirin?: No    Have you had any immunizations at another office such as Influenza, Hepatitis B, Tetanus, or Pneumococcal?: No    At any time do you feel concerned for the safety/well-being of yourself and/or your children, in your home or elsewhere?: No     CAGE:     Cut: Have you ever felt you should Cut down on your drinking?: No    Annoyed: Have people Annoyed you by criticizing your drinking?: No    Guilty: Have you ever felt bad or Guilty about your drinking?: No    Eye Opener: Have you ever had a drink first thing in the morning to steady your nerves or to get rid of a hangover (Eye opener)?: No    Scoring  Total Score: 0     PHQ-4: Over the LAST 2 WEEKS        Depression Screening (PHQ-2/PHQ-9): Over the LAST 2 WEEKS   Little interest or pleasure in doing things (over the last two weeks)?: Not at all    Feeling down, depressed, or hopeless (over the last two weeks)?: Not at all    PHQ-2 SCORE: 0              PREVENTATIVE SERVICES  INDICATIONS AND SCHEDULE Recommendation Internal Lab or Procedure External Lab or Procedure   Colonoscopy Screen Every 10 years Health Maintenance   Topic Date Due    Colorectal Cancer Screening  06/04/2029       Flex Sigmoidoscopy Screen  Every 5 years No results found for this or any previous visit.    Fecal Occult Blood  Annually No results found for: \"FOB\", \"OCCULTSTOOL\"    Obesity Screening Screen all adults annually Body mass index is 31.33 kg/m².      Preventive Services for Which Recommendations Vary with Risk Recommendation Internal Lab or Procedure External Lab or Procedure   Cholesterol Screening Recommended screening varies with age, risk and gender LDL Cholesterol (mg/dL)   Date Value   07/18/2024 86       Diabetes Screening  If history of high blood pressure or other  risk factors No results found for: \"A1C\"  Glucose (mg/dL)   Date Value   09/26/2024 124 (H)         Gonorrhea Screening If high risk No results found for: \"GONOCOCCUS\"    HIV Screening For all adults age 18-65, older adults at increased risk No results found for: \"HIV\"    Syphilis Screening Screen if pregnant or high risk No results found for: \"RPR\"    Hepatitis C Screening Screen those at high risk plus screen one time for adults born 1946-1 965 No results found for: \"HCVAB\"    Tuberculosis Screen If high risk No components found for: \"PPDINDURAT\"      ALLERGIES:   Allergies[1]    CURRENT MEDICATIONS:   Current Outpatient Medications   Medication Sig Dispense Refill    hydroCHLOROthiazide 25 MG Oral Tab Take 1 tablet (25 mg total) by mouth daily. 90 tablet 1    PARoxetine 20 MG Oral Tab Take 1 tablet (20 mg total) by mouth every morning. 90 tablet 0    Tadalafil  (CIALIS) 20 MG Oral Tab Take 1 tablet (20 mg total) by mouth daily as needed for Erectile Dysfunction. 30 tablet 1    clobetasol 0.05 % External Cream Apply 1 Application topically 2 (two) times daily. 60 g 1    aspirin 325 MG Oral Tab Take 1 tablet (325 mg total) by mouth daily.      Multiple Vitamin (ONE-DAILY MULTI VITAMINS) Oral Tab Take 1 tablet by mouth daily.        MEDICAL INFORMATION:   Past Medical History:    Back problem    Cancer (HCC)    CLEAR CELL RENAL CARCINOMA    Carcinoid tumor of kidney (HCC)    Essential hypertension    Hearing impaired person, bilateral    HEARING AIDS BILATERAL    Renal disorder    CKD. HX OF NEPHRECTOMY, FOLLOWS YEARLY WITH UROLOGY      Past Surgical History:   Procedure Laterality Date    Colonoscopy N/A 06/04/2024    Procedure: COLONOSCOPY with forcep polypectomy;  Surgeon: Windy Nguyen MD;  Location:  ENDOSCOPY    Nephrectomy Right 11/2020    Other surgical history  2107    Kidney cancer      Family History   Problem Relation Age of Onset    Cancer Father         prostate    Heart Disease Father     Diabetes Mother     Arthritis Mother       SOCIAL HISTORY:   Social History     Socioeconomic History    Marital status:    Tobacco Use    Smoking status: Never    Smokeless tobacco: Never   Vaping Use    Vaping status: Never Used   Substance and Sexual Activity    Alcohol use: Never    Drug use: Never   Other Topics Concern    Caffeine Concern No    Exercise Yes    Seat Belt Yes    Special Diet No    Stress Concern No    Weight Concern No          REVIEW OF SYSTEMS:   Constitutional: Negative for fever, chills and fatigue.   HENT: Negative for hearing loss, congestion, sore throat and neck pain.    Eyes: Negative for pain and visual disturbance.   Respiratory: Negative for cough and shortness of breath.    Cardiovascular: Negative for chest pain and palpitations.   Gastrointestinal: Negative for nausea, vomiting, abdominal pain and diarrhea.   Genitourinary:  Negative for urgency, frequency and difficulty urinating.   Musculoskeletal: Negative for arthralgias and no gait problem.   Skin: Negative for color change and rash.   Neurological: Negative for tremors, weakness and numbness.   Hematological: Negative for adenopathy. Does not bruise/bleed easily.   Psychiatric/Behavioral: Negative for confusion and agitation. The patient is not nervous/anxious.      EXAM:   /80   Pulse 74   Temp 98 °F (36.7 °C) (Temporal)   Resp 16   Ht 6' 2\" (1.88 m)   Wt 244 lb (110.7 kg)   SpO2 98%   BMI 31.33 kg/m²   Constitutional: He appears well-developed, nourished, and his stated age. Vital signs reviewed  Pleasant man   Head: Normocephalic and atraumatic.   Ear: TMs visible and normal bilaterally  Nose: Nose normal.   Eyes: EOM are normal. Pupils are equal, round, and reactive to light. No scleral icterus.   Neck: Normal range of motion. No thyromegaly present.   Cardiovascular: Normal rate, regular rhythm and normal heart sounds.  Exam reveals no friction rub.    No murmur heard.  Pulmonary/Chest: Effort normal and breath sounds normal. He has no wheezes. He has no rales.   Abdominal: Soft. Bowel sounds are normal. There is no tenderness.   Musculoskeletal: Normal range of motion. He exhibits no edema.   Lymphadenopathy:     He has no cervical adenopathy.   Neurological: He is alert and oriented to person, place, and time. He has normal reflexes.   Skin: Skin is warm. No rash noted. No erythema. with normal hair  Psychiatric: He has a normal mood and affect. His behavior is normal.     ASSESSMENT AND OTHER RELEVANT CHRONIC CONDITIONS:   Bill Raygoza is a 59 year old male who presents for an Annual Health Assessment.   1. Wellness examination    2. Chronic pain of right knee        PLAN SUMMARY:   Bill Raygoza is a 59 year old male  Age appropriate cancer screening, labs, safety, immunizations were discussed with the patient and ordered as follows:  Diagnoses and all  orders for this visit:    Wellness examination  -     CBC With Differential With Platelet; Future  -     Comp Metabolic Panel (14); Future  -     Lipid Panel; Future  -     TSH and Free T4; Future  -     Urinalysis, Routine [E]; Future    Chronic pain of right knee  -     Ortho Referral - In Network    Continue with current medication regimen  Plan as above mention.  Follow-up after 6 months or as needed  Keep hydrated    This note was prepared using Dragon Medical voice recognition dictation software. As a result errors may occur. When identified these errors have been corrected. While every attempt is made to correct errors during dictation discrepancies may still exist            Orders Placed This Encounter   Procedures    CBC With Differential With Platelet    Comp Metabolic Panel (14)    Lipid Panel    TSH and Free T4    Urinalysis, Routine [E]       Imaging & Consults:  ORTHOPEDIC - INTERNAL    His 5 year prevention plan includes: annual visits for fasting labs  Health Maintenance   Topic Date Due    Pneumococcal Vaccine: 50+ Years (1 of 2 - PCV) Never done    DTaP,Tdap,and Td Vaccines (1 - Tdap) Never done    Zoster Vaccines (1 of 2) Never done    COVID-19 Vaccine (4 - 2024-25 season) 09/01/2024    Annual Physical  07/18/2025    PSA  07/26/2025    Colorectal Cancer Screening  06/04/2029    Influenza Vaccine  Completed    Annual Depression Screening  Completed    Meningococcal B Vaccine  Aged Out     Patient/Caregiver Education:  Patient/Caregiver Education: There are no barriers to learning. Medical education done.  Outcome: Patient verbalizes understanding.    Educated by: MD   The patient indicates understanding of these issues and agrees to the plan.    SUGGESTED VACCINATIONS - Influenza, Pneumococcal, Zoster, Tetanus     Immunization History   Administered Date(s) Administered    Covid-19 Vaccine Pfizer 30 mcg/0.3 ml 04/20/2021, 05/11/2021, 01/10/2022    FLULAVAL 6 months & older 0.5 ml Prefilled syringe  (26552) 09/28/2021    FLUZONE 6 months and older PFS 0.5 ml (75100) 10/13/2020, 09/28/2021, 10/23/2023    Fluvirin, 3 Years & >, Im 11/18/2012, 01/23/2015, 02/22/2016, 10/17/2016    Influenza 03/14/2018, 10/01/2020    Influenza Vaccine, trivalent (IIV3), PF 0.5mL (09506) 11/13/2024       Influenza Annually   Pneumococcal if high risk   Td/Tdap once then every 10 years   HPV Males 11-21   Zoster (Shingles) 60 and older: one dose   Varicella 2 doses if not immune   MMR 1-2 doses if born after 1957 and not immune     Patient Active Problem List   Diagnosis    Clear cell carcinoma of right kidney (HCC)    Essential hypertension    Premature ejaculation    Noise-induced hearing loss of both ears    Sensory hearing loss, bilateral    Abnormal auditory perception, unspecified laterality    TITI (generalized anxiety disorder)    Lipoma of torso    Fatty liver    Lumbar radiculopathy    Colon cancer screening    Stage 3a chronic kidney disease (HCC)     PREVENTIVE VISIT,EST,40-64         [1] No Known Allergies

## 2025-04-09 LAB
EST. AVERAGE GLUCOSE BLD GHB EST-MCNC: 140 MG/DL (ref 68–126)
HBA1C MFR BLD: 6.5 % (ref ?–5.7)

## 2025-05-19 DIAGNOSIS — F41.1 GAD (GENERALIZED ANXIETY DISORDER): ICD-10-CM

## 2025-05-19 RX ORDER — PAROXETINE 20 MG/1
20 TABLET, FILM COATED ORAL EVERY MORNING
Qty: 90 TABLET | Refills: 0 | Status: SHIPPED | OUTPATIENT
Start: 2025-05-19

## 2025-05-19 NOTE — TELEPHONE ENCOUNTER
Requesting Paroxetine 20mg  LOV: 4/8/25 Physical  RTC: 6 months  Last Relevant Labs: 4/8/25  Filled: 10/30/24 #90 with 0 refills    Future Appointments   Date Time Provider Department Center   6/11/2025  9:30 AM Dc Cevallos MD [id number]   10/7/2025  2:00 PM Aravind Apodaca MD [id number] [address]     Psychiatric Non-Scheduled (Anti-Anxiety) [id number]/19/2025 12:36 PM   Protocol Details   In person appointment or virtual visit in the past 6 mos or appointment in next 3 mos    Depression Screening completed within the past 12 months    Medication is active on med list     Rx sent to pharmacy per protocol

## 2025-06-04 RX ORDER — HYDROCHLOROTHIAZIDE 25 MG/1
25 TABLET ORAL DAILY
Qty: 90 TABLET | Refills: 1 | Status: SHIPPED | OUTPATIENT
Start: 2025-06-04

## 2025-06-04 NOTE — TELEPHONE ENCOUNTER
Requesting Hydrochlorothiazide 25mg  LOV: 4/8/25 Physical  RTC: 6 months  Last Relevant Labs: 4/8/25  Filled: 11/13/24 #90 with 1 refills    Future Appointments   Date Time Provider Department Center   6/11/2025  9:30 AM Dc Cevallos MD [id number]   10/7/2025  2:00 PM Aravind Apodaca MD [id number] [address]     Hypertension Medications Protocol Pomukl4006/03/2025 06:40 PM   Protocol Details   CMP or BMP in past 12 months    Last BP reading less than 140/90    In person appointment or virtual visit in the past 12 mos or appointment in next 3 mos    EGFRCR or GFRNAA > 50    Medication is active on med list     Rx sent to pharmacy per protocol

## 2025-06-20 NOTE — PROGRESS NOTES
HPI:     Bill Raygoza is a 59 year old male with a PMH of anxiety/depression, HTN, OA, CKD 3.    Following for:  1. H/o Right RCC  - s/p nx at [facility] 11/2020  - path: Grade 2 clear cell RCC, negative margins, stage T1b  2. Recurrent kidney stones  - passed two, one in 2020 and renal mass was detected at that time  3. Fam h/o CaP  - dad dx in 70s  4. BPH w/o LUTS  5. ED  - 5 mg cialis    PCP - Leyda  Nephro - Constanza  LOV 7/14/23    He feels well. Appetite and energy are good.    AUA SS is zero. Happy with LUTS.  Incontinence: none  ROSEMARIE prior visit:  > 30-40 g, no nodules or tenderness    Cr 1.42 4/8/25  Cr ~ 1.3-1.4 baseline    ~ 30% potency without meds. No response to viagra or cialis. He wants to try trimix teaching.    PSA 1.77 7/26/23. We discussed the risks and benefits to PSA screening and he would prefer to check.    Drinks ~ 60 oz water, 20 oz tea with medium yellow urine.    Gross hematuria: none  Tobacco hx: none  Fam h/o CaP, no other  cancers.    CT CA 7/5/23: stable  CT CA Aug 2022: 3 mm triangular perifissural nodule in RML of lung, likely benign. FLD  CT pelvis 5/5/22: right lateral abd wall hernia containing fat, small umbilical hernia    We reviewed the NCCN guidelines for f/u for T1b RCC s/p radical nephrectomy.  - annual imaging with C/A up to 3 y with chest imaging up to 5 y    Discussed we can check CT C/A now and if this looks OK he is at the recommended 5 y f/u and can probably stop imaging afterwards.    Will check CT CA, observation for BPH, continue annual PSA checks for fam h/o CaP. Trimix teaching.    HISTORY:  Past Medical History:    Back problem    Cancer (HCC)    CLEAR CELL RENAL CARCINOMA    Carcinoid tumor of kidney (HCC)    Essential hypertension    Hearing impaired person, bilateral    HEARING AIDS BILATERAL    Renal disorder    CKD. HX OF NEPHRECTOMY, FOLLOWS YEARLY WITH UROLOGY      Past Surgical History:   Procedure Laterality Date    Colonoscopy N/A 06/04/2024     Procedure: COLONOSCOPY with forcep polypectomy;  Surgeon: Windy Nguyen MD;  Location:  ENDOSCOPY    Nephrectomy Right 11/2020    Other surgical history  2107    Kidney cancer      Family History   Problem Relation Age of Onset    Cancer Father         prostate    Heart Disease Father     Diabetes Mother     Arthritis Mother       Social History:   Social History     Socioeconomic History    Marital status:    Tobacco Use    Smoking status: Never    Smokeless tobacco: Never   Vaping Use    Vaping status: Never Used   Substance and Sexual Activity    Alcohol use: Never    Drug use: Never   Other Topics Concern    Caffeine Concern No    Exercise Yes    Seat Belt Yes    Special Diet No    Stress Concern No    Weight Concern No        Medications (Active prior to today's visit):  Current Outpatient Medications   Medication Sig Dispense Refill    HYDROCHLOROTHIAZIDE 25 MG Oral Tab TAKE 1 TABLET(25 MG) BY MOUTH DAILY 90 tablet 1    PAROXETINE 20 MG Oral Tab TAKE 1 TABLET(20 MG) BY MOUTH EVERY MORNING 90 tablet 0    Tadalafil (CIALIS) 20 MG Oral Tab Take 1 tablet (20 mg total) by mouth daily as needed for Erectile Dysfunction. 30 tablet 1    clobetasol 0.05 % External Cream Apply 1 Application topically 2 (two) times daily. 60 g 1    aspirin 325 MG Oral Tab Take 1 tablet (325 mg total) by mouth daily.      Multiple Vitamin (ONE-DAILY MULTI VITAMINS) Oral Tab Take 1 tablet by mouth daily.         Allergies:  No Known Allergies      ROS:     A comprehensive 10 point review of systems was completed.  Pertinent positives and negatives noted in the the HPI.    PHYSICAL EXAM:     GENERAL APPEARANCE: well, developed, well nourished, in no acute distress  NEUROLOGIC: nonfocal, alert and oriented  HEAD: normocephalic, atraumatic  EYES: sclera non-icteric  EARS: hearing intact  ORAL CAVITY: mucosa moist  NECK/THYROID: no obvious goiter or masses  LUNGS: nonlabored breathing  ABDOMEN: soft, no obvious masses or  tenderness  SKIN: no obvious rashes    : as noted above     ASSESSMENT/PLAN:   Diagnoses and all orders for this visit:    Erectile dysfunction, unspecified erectile dysfunction type  -     URINALYSIS, AUTO, W/O SCOPE    Renal cell carcinoma, unspecified laterality (HCC)  -     URINALYSIS, AUTO, W/O SCOPE  -     CT CHEST/ABDOMEN  (CPT=71250/03890); Future    BPH with obstruction/lower urinary tract symptoms  -     URINALYSIS, AUTO, W/O SCOPE    Family history of prostate cancer  -     URINALYSIS, AUTO, W/O SCOPE    Elevated PSA  -     PSA Total, Diagnostic; Future    - as noted above.    Thanks again for this consult.    Dc Cevallos MD, FACS  Urologist  [facility]  Office: [phone]

## 2025-06-23 ENCOUNTER — OFFICE VISIT (OUTPATIENT)
Dept: SURGERY | Facility: CLINIC | Age: 60
End: 2025-06-23
Payer: MEDICAID

## 2025-06-23 DIAGNOSIS — Z80.42 FAMILY HISTORY OF PROSTATE CANCER: ICD-10-CM

## 2025-06-23 DIAGNOSIS — N13.8 BPH WITH OBSTRUCTION/LOWER URINARY TRACT SYMPTOMS: ICD-10-CM

## 2025-06-23 DIAGNOSIS — R97.20 ELEVATED PSA: ICD-10-CM

## 2025-06-23 DIAGNOSIS — N40.1 BPH WITH OBSTRUCTION/LOWER URINARY TRACT SYMPTOMS: ICD-10-CM

## 2025-06-23 DIAGNOSIS — C64.9 RENAL CELL CARCINOMA, UNSPECIFIED LATERALITY (HCC): ICD-10-CM

## 2025-06-23 DIAGNOSIS — N52.9 ERECTILE DYSFUNCTION, UNSPECIFIED ERECTILE DYSFUNCTION TYPE: Primary | ICD-10-CM

## 2025-06-23 LAB
APPEARANCE: CLEAR
BILIRUBIN: NEGATIVE
GLUCOSE (URINE DIPSTICK): NEGATIVE MG/DL
KETONES (URINE DIPSTICK): NEGATIVE MG/DL
LEUKOCYTES: NEGATIVE
MULTISTIX LOT#: ABNORMAL NUMERIC
NITRITE, URINE: NEGATIVE
OCCULT BLOOD: NEGATIVE
PH, URINE: 6 (ref 4.5–8)
PROTEIN (URINE DIPSTICK): NEGATIVE MG/DL
SPECIFIC GRAVITY: 1 (ref 1–1.03)
URINE-COLOR: YELLOW
UROBILINOGEN,SEMI-QN: 0.2 MG/DL (ref 0–1.9)

## 2025-06-23 PROCEDURE — 99214 OFFICE O/P EST MOD 30 MIN: CPT | Performed by: UROLOGY

## 2025-06-23 PROCEDURE — 81003 URINALYSIS AUTO W/O SCOPE: CPT | Performed by: UROLOGY

## 2025-06-24 NOTE — PROGRESS NOTES
HPI:     Bill Raygoza is a 59 year old male with a PMH of anxiety/depression, HTN, OA, CKD 3.    Following for:  1. H/o Right RCC  - s/p nx at [facility] 11/2020  - path: Grade 2 clear cell RCC, negative margins, stage T1b  2. Recurrent kidney stones  - passed two, one in 2020 and renal mass was detected at that time  3. Fam h/o CaP  - dad dx in 70s  4. BPH w/o LUTS  5. ED  - 5 mg cialis    PCP - Leyda  Nephro - Constanza  LOV 6/23/2025    He feels well. Appetite and energy are good.  We are working on insurance approval for imaging.  May need to get a chest x-ray and renal ultrasound if his insurance does not cover a CT scan    AUA SS is zero. Happy with LUTS.  Incontinence: none  ROSEMARIE prior visit:  > 30-40 g, no nodules or tenderness    Cr 1.42 4/8/25  Cr ~ 1.3-1.4 baseline    ~ 30% potency without meds. No response to viagra or cialis. He wants to try trimix teaching.    PSA 1.73 6/26/25    Drinks ~ 60 oz water, 20 oz tea with medium yellow urine.    Gross hematuria: none  Tobacco hx: none  Fam h/o CaP, no other  cancers.    CT CA 7/5/23: stable  CT CA Aug 2022: 3 mm triangular perifissural nodule in RML of lung, likely benign. FLD  CT pelvis 5/5/22: right lateral abd wall hernia containing fat, small umbilical hernia    We reviewed the NCCN guidelines for f/u for T1b RCC s/p radical nephrectomy.  - annual imaging with C/A up to 3 y with chest imaging up to 5 y    Discussed we can check CT C/A now and if this looks OK he is at the recommended 5 y f/u and can probably stop imaging afterwards.    He presents today for trimix teaching.  Instruction on injection technique and educational materials provided and reviewed in detail. Pt able to inject 0.15 mL without difficulty with very strong response.  He also had difficulty injecting the medication himself.  He has a very strong response to a low-dose Trimix and discussed it would probably be unsafe to use Trimix at home.  I would recommend he try either Viagra or  Cialis again and he is amenable to trying Cialis again.     Will check CT CA, observation for BPH, continue annual PSA checks for fam h/o CaP. F/u in 1 y.  He still needs to check CT and if this looks OK can stop imaging. Trial 20 mg cialis prn.  Over 30 minutes spent in consultation and educating this patient today as well as coordination of his care.        HISTORY:  Past Medical History:    Back problem    Cancer (HCC)    CLEAR CELL RENAL CARCINOMA    Carcinoid tumor of kidney (HCC)    Essential hypertension    Hearing impaired person, bilateral    HEARING AIDS BILATERAL    Renal disorder    CKD. HX OF NEPHRECTOMY, FOLLOWS YEARLY WITH UROLOGY      Past Surgical History:   Procedure Laterality Date    Colonoscopy N/A 06/04/2024    Procedure: COLONOSCOPY with forcep polypectomy;  Surgeon: Windy Nguyen MD;  Location:  ENDOSCOPY    Nephrectomy Right 11/2020    Other surgical history  2107    Kidney cancer      Family History   Problem Relation Age of Onset    Cancer Father         prostate    Heart Disease Father     Diabetes Mother     Arthritis Mother       Social History:   Social History     Socioeconomic History    Marital status:    Tobacco Use    Smoking status: Never    Smokeless tobacco: Never    Tobacco comments:     Updated 7/1/25   Vaping Use    Vaping status: Never Used   Substance and Sexual Activity    Alcohol use: Never    Drug use: Never   Other Topics Concern    Caffeine Concern No    Exercise Yes    Seat Belt Yes    Special Diet No    Stress Concern No    Weight Concern No        Medications (Active prior to today's visit):  Current Outpatient Medications   Medication Sig Dispense Refill    Tadalafil (CIALIS) 20 MG Oral Tab Take 1 tablet (20 mg total) by mouth daily as needed for Erectile Dysfunction. 30 tablet 5    Tirzepatide-Weight Management (ZEPBOUND) 2.5 MG/0.5ML Subcutaneous Solution Auto-injector Inject 2.5 mg into the skin once a week. 2 mL 0    HYDROCHLOROTHIAZIDE 25 MG Oral  Tab TAKE 1 TABLET(25 MG) BY MOUTH DAILY 90 tablet 1    PAROXETINE 20 MG Oral Tab TAKE 1 TABLET(20 MG) BY MOUTH EVERY MORNING 90 tablet 0    Tadalafil (CIALIS) 20 MG Oral Tab Take 1 tablet (20 mg total) by mouth daily as needed for Erectile Dysfunction. 30 tablet 1    clobetasol 0.05 % External Cream Apply 1 Application topically 2 (two) times daily. 60 g 1    aspirin 325 MG Oral Tab Take 1 tablet (325 mg total) by mouth daily.      Multiple Vitamin (ONE-DAILY MULTI VITAMINS) Oral Tab Take 1 tablet by mouth daily.         Allergies:  No Known Allergies      ROS:     A comprehensive 10 point review of systems was completed.  Pertinent positives and negatives noted in the the HPI.    PHYSICAL EXAM:     GENERAL APPEARANCE: well, developed, well nourished, in no acute distress  NEUROLOGIC: nonfocal, alert and oriented  HEAD: normocephalic, atraumatic  EYES: sclera non-icteric  EARS: hearing intact  ORAL CAVITY: mucosa moist  NECK/THYROID: no obvious goiter or masses  LUNGS: nonlabored breathing  ABDOMEN: soft, no obvious masses or tenderness  SKIN: no obvious rashes    : as noted above     ASSESSMENT/PLAN:   Diagnoses and all orders for this visit:    Erectile dysfunction, unspecified erectile dysfunction type  -     Tadalafil (CIALIS) 20 MG Oral Tab; Take 1 tablet (20 mg total) by mouth daily as needed for Erectile Dysfunction.    Renal cell carcinoma, unspecified laterality (HCC)    BPH with obstruction/lower urinary tract symptoms    Elevated PSA    - as noted above.    Thanks again for this consult.    Dc Cevallos MD, FACS  Urologist  [facility]  Office: [phone]

## 2025-06-26 ENCOUNTER — LAB ENCOUNTER (OUTPATIENT)
Dept: LAB | Age: 60
End: 2025-06-26
Attending: FAMILY MEDICINE
Payer: MEDICAID

## 2025-06-26 ENCOUNTER — OFFICE VISIT (OUTPATIENT)
Dept: FAMILY MEDICINE CLINIC | Facility: CLINIC | Age: 60
End: 2025-06-26
Payer: MEDICAID

## 2025-06-26 VITALS
TEMPERATURE: 98 F | DIASTOLIC BLOOD PRESSURE: 78 MMHG | WEIGHT: 251 LBS | OXYGEN SATURATION: 99 % | SYSTOLIC BLOOD PRESSURE: 130 MMHG | BODY MASS INDEX: 32.21 KG/M2 | HEIGHT: 74 IN | HEART RATE: 72 BPM | RESPIRATION RATE: 16 BRPM

## 2025-06-26 DIAGNOSIS — R73.09 ELEVATED HEMOGLOBIN A1C: ICD-10-CM

## 2025-06-26 DIAGNOSIS — L98.9 SKIN LESION: Primary | ICD-10-CM

## 2025-06-26 DIAGNOSIS — K76.0 FATTY LIVER: ICD-10-CM

## 2025-06-26 DIAGNOSIS — E66.9 OBESITY (BMI 30-39.9): ICD-10-CM

## 2025-06-26 DIAGNOSIS — R73.03 PREDIABETES: ICD-10-CM

## 2025-06-26 DIAGNOSIS — R97.20 ELEVATED PSA: ICD-10-CM

## 2025-06-26 DIAGNOSIS — M77.8 LEFT ELBOW TENDONITIS: ICD-10-CM

## 2025-06-26 LAB
EST. AVERAGE GLUCOSE BLD GHB EST-MCNC: 143 MG/DL (ref 68–126)
HBA1C MFR BLD: 6.6 % (ref ?–5.7)
PSA SERPL-MCNC: 1.73 NG/ML (ref ?–4)

## 2025-06-26 PROCEDURE — 84153 ASSAY OF PSA TOTAL: CPT

## 2025-06-26 PROCEDURE — 36415 COLL VENOUS BLD VENIPUNCTURE: CPT

## 2025-06-26 PROCEDURE — 99215 OFFICE O/P EST HI 40 MIN: CPT | Performed by: FAMILY MEDICINE

## 2025-06-26 PROCEDURE — 83036 HEMOGLOBIN GLYCOSYLATED A1C: CPT

## 2025-06-26 RX ORDER — TIRZEPATIDE 2.5 MG/.5ML
2.5 INJECTION, SOLUTION SUBCUTANEOUS WEEKLY
Qty: 2 ML | Refills: 0 | Status: SHIPPED | OUTPATIENT
Start: 2025-06-26

## 2025-06-26 NOTE — PROGRESS NOTES
Subjective:   Patient ID: Bill Raygoza is a 59 year old male.    HPI  Mr. Raygoza is a very pleasant 59-year-old gentleman with history of right nephrectomy secondary to renal cell carcinoma of the right kidney, hypertension which is diet-controlled, anxiety, arthritis accompanied by his wife for discoloration of the right side of his face.  Initially this was a smaller area on the right side of his face but recently he had noticed that this had grown in size.  He does not report any itching, pain, drainage or discharge or swelling.  No new skin products that he had used.  He also has been experiencing left elbow pain for the past 2 weeks which would radiate to his left shoulder.  It tends to hurt on the lateral aspect of his left elbow.  He he does not notice any discoloration, redness, swelling.  No numbness or tingling.  He had seen his nephrologist recently and was recommended to lose weight.  It was advised for him to try GLP-1 medication to help.    I had reviewed past medical and family histories together with allergy and medication lists documented.    History/Other:   Review of Systems   Constitutional:  Negative for fatigue and fever.   HENT:  Negative for sore throat and trouble swallowing.    Respiratory:  Negative for cough and shortness of breath.    Cardiovascular:  Negative for chest pain.   Gastrointestinal:  Negative for abdominal pain, constipation, diarrhea, nausea and vomiting.   Genitourinary:  Negative for difficulty urinating.   Neurological:  Negative for dizziness.     Current Medications[1]  Allergies:Allergies[2]    Objective:   Physical Exam  Vitals reviewed.   Constitutional:       General: He is not in acute distress.  HENT:      Head:        Comments: Large hyperpigmented area noted on the right temporal, right preauricular, right malar areas with no erythema no warmth no swelling no tenderness.     Mouth/Throat:      Mouth: Mucous membranes are moist.      Pharynx: Oropharynx is clear.    Eyes:      General: No scleral icterus.     Conjunctiva/sclera: Conjunctivae normal.   Cardiovascular:      Rate and Rhythm: Normal rate and regular rhythm.      Heart sounds: Normal heart sounds. No murmur heard.  Pulmonary:      Effort: Pulmonary effort is normal. No respiratory distress.      Breath sounds: Normal breath sounds. No wheezing or rales.   Abdominal:      General: Bowel sounds are normal. There is no distension.      Palpations: Abdomen is soft.      Tenderness: There is no abdominal tenderness.   Musculoskeletal:      Left shoulder: No swelling, tenderness or bony tenderness. Normal range of motion.      Left elbow: No swelling, deformity or effusion. Normal range of motion. Tenderness present in radial head and lateral epicondyle. No medial epicondyle or olecranon process tenderness.      Cervical back: Neck supple.      Right lower leg: No edema.      Left lower leg: No edema.   Lymphadenopathy:      Cervical: No cervical adenopathy.   Neurological:      Mental Status: He is alert.   Psychiatric:         Mood and Affect: Mood normal.         Behavior: Behavior normal.         Thought Content: Thought content normal.         Assessment & Plan:   1. Skin lesion   -Face  - Unclear as to etiology  - Will refer to dermatology to be evaluated further and await recommendations   2. Obesity (BMI 30-39.9)   -Would benefit from GLP-1 medication such as Zepbound  - Will start on Zepbound 2.5 mg every week  -Discussed how this medication works and also discussed possible side effects which include allergic reaction, nausea, vomiting, abdominal pain, diarrhea, constipation, dizziness,  -try to keep daily calorie deficit   3. Fatty liver   -Please see #2   4. Prediabetes   -Previous A1c 6.5%  - Please see #2 for management   5. Left elbow tendonitis   - Has taken Tylenol but with no improvement  - Will refer to orthopedics for further evaluation management   Follow-up as scheduled or as needed    This note  was prepared using Dragon Medical voice recognition dictation software. As a result errors may occur. When identified these errors have been corrected. While every attempt is made to correct errors during dictation discrepancies may still exist            No orders of the defined types were placed in this encounter.      Meds This Visit:  Requested Prescriptions     Signed Prescriptions Disp Refills    Tirzepatide-Weight Management (ZEPBOUND) 2.5 MG/0.5ML Subcutaneous Solution Auto-injector 2 mL 0     Sig: Inject 2.5 mg into the skin once a week.       Imaging & Referrals:  DERM - INTERNAL  ORTHOPEDIC - INTERNAL         [1]   Current Outpatient Medications   Medication Sig Dispense Refill    Tirzepatide-Weight Management (ZEPBOUND) 2.5 MG/0.5ML Subcutaneous Solution Auto-injector Inject 2.5 mg into the skin once a week. 2 mL 0    HYDROCHLOROTHIAZIDE 25 MG Oral Tab TAKE 1 TABLET(25 MG) BY MOUTH DAILY 90 tablet 1    PAROXETINE 20 MG Oral Tab TAKE 1 TABLET(20 MG) BY MOUTH EVERY MORNING 90 tablet 0    Tadalafil (CIALIS) 20 MG Oral Tab Take 1 tablet (20 mg total) by mouth daily as needed for Erectile Dysfunction. 30 tablet 1    clobetasol 0.05 % External Cream Apply 1 Application topically 2 (two) times daily. 60 g 1    aspirin 325 MG Oral Tab Take 1 tablet (325 mg total) by mouth daily.      Multiple Vitamin (ONE-DAILY MULTI VITAMINS) Oral Tab Take 1 tablet by mouth daily.     [2] No Known Allergies

## 2025-06-26 NOTE — PATIENT INSTRUCTIONS
Thank you for choosing Michael Crabtree MD at [facility]  To Do: Bill Raygoza  1. Please see below   Call [phone] to schedule the appointment.   Please signup for [employer], which is electronic access to your record if you have not done so.  All your results will post on there.  [email or web address]   You can NOW use [employer] to book your appointments with us, or consider using open access scheduling which is through the [place] website [email or web address] and type in Michael Crabtree MD and follow the links for \"Schedule Online Now\"    To schedule Imaging or tests at [place] call Central Scheduling [phone], Go to [facility] A ER Building (For example: CT scans, X rays, Ultrasound, MRI)  Cardiac Testing in ER building Building A second floor Cardiac Testing [phone] (For example: Holter Monitor, Cardiac Stress tests,Event Monitor, or 2D Echocardiograms)  [facility] call [phone] usually in [facility] A  Walk in Clinic in [place] at [address] Mon-Fri at 8am-7:30 p.m., and Sat/Sun 9:00a.m.-4:30 p.m.  Also at [address] [place] [address]  Call [phone] for info     Please call our office about any questions regarding your treatment/medicines/tests as a result of today's visit.  For your safety, read the entire package insert of all medicines prescribed to you and be aware of all of the risks of treatment even beyond those discussed today.  All therapies have potential risk of harm or side effects or medication interactions.  It is your duty and for your safety to discuss with the pharmacist and our office with questions, and to notify us and stop treatment if problems arise, but know that our intention is that the benefits outweigh those potential risks and we strive to make you healthier and to improve your quality of life.    Referrals, and Radiology Information:    If your insurance requires a referral to a specialist, please allow 5 business days to process your  referral request.    If Michael Crabtree MD orders a CT or MRI, it may take up to 10 business days to receive approval from your insurance company. Once our office has called informing you that the insurance company approved your testing, please call Central Scheduling at [phone]  Please allow our office 5 business days to contact you regarding any testing results.    Refill policies:   Allow 3 business days for refills; controlled substances may take longer and must be picked up from the office in person.  Narcotic medications can only be filled in 30 day increments and must be refilled at an office visit only.  If your prescription is due for a refill, you may be due for a follow-up appointment.  We cannot refill your maintenance medications at a preventative wellness visit.  To best provide you care, patients receiving maintenance medications need to be seen at least twice a year.

## 2025-06-28 ENCOUNTER — PATIENT MESSAGE (OUTPATIENT)
Dept: FAMILY MEDICINE CLINIC | Facility: CLINIC | Age: 60
End: 2025-06-28

## 2025-07-01 ENCOUNTER — TELEPHONE (OUTPATIENT)
Dept: FAMILY MEDICINE CLINIC | Facility: CLINIC | Age: 60
End: 2025-07-01

## 2025-07-01 ENCOUNTER — TELEPHONE (OUTPATIENT)
Dept: ADMINISTRATIVE | Facility: HOSPITAL | Age: 60
End: 2025-07-01

## 2025-07-01 ENCOUNTER — OFFICE VISIT (OUTPATIENT)
Dept: SURGERY | Facility: CLINIC | Age: 60
End: 2025-07-01

## 2025-07-01 ENCOUNTER — OFFICE VISIT (OUTPATIENT)
Facility: CLINIC | Age: 60
End: 2025-07-01
Payer: MEDICAID

## 2025-07-01 DIAGNOSIS — N40.1 BPH WITH OBSTRUCTION/LOWER URINARY TRACT SYMPTOMS: ICD-10-CM

## 2025-07-01 DIAGNOSIS — R97.20 ELEVATED PSA: ICD-10-CM

## 2025-07-01 DIAGNOSIS — N52.9 ERECTILE DYSFUNCTION, UNSPECIFIED ERECTILE DYSFUNCTION TYPE: Primary | ICD-10-CM

## 2025-07-01 DIAGNOSIS — N13.8 BPH WITH OBSTRUCTION/LOWER URINARY TRACT SYMPTOMS: ICD-10-CM

## 2025-07-01 DIAGNOSIS — M25.562 LEFT KNEE PAIN, UNSPECIFIED CHRONICITY: Primary | ICD-10-CM

## 2025-07-01 DIAGNOSIS — C64.9 RENAL CELL CARCINOMA, UNSPECIFIED LATERALITY (HCC): ICD-10-CM

## 2025-07-01 DIAGNOSIS — M17.11 PRIMARY OSTEOARTHRITIS OF RIGHT KNEE: ICD-10-CM

## 2025-07-01 PROCEDURE — 99214 OFFICE O/P EST MOD 30 MIN: CPT | Performed by: UROLOGY

## 2025-07-01 PROCEDURE — 20610 DRAIN/INJ JOINT/BURSA W/O US: CPT | Performed by: PHYSICIAN ASSISTANT

## 2025-07-01 RX ORDER — TADALAFIL 20 MG/1
20 TABLET ORAL
Qty: 30 TABLET | Refills: 5 | Status: SHIPPED | OUTPATIENT
Start: 2025-07-01

## 2025-07-01 RX ORDER — TRIAMCINOLONE ACETONIDE 40 MG/ML
40 INJECTION, SUSPENSION INTRA-ARTICULAR; INTRAMUSCULAR ONCE
Status: COMPLETED | OUTPATIENT
Start: 2025-07-01 | End: 2025-07-01

## 2025-07-01 RX ADMIN — TRIAMCINOLONE ACETONIDE 40 MG: 40 INJECTION, SUSPENSION INTRA-ARTICULAR; INTRAMUSCULAR at 10:40:00

## 2025-07-01 NOTE — TELEPHONE ENCOUNTER
Received fax from pharmacy on Zepbound, medication not covered-needs PA. Patient ID# [id number] Fax in MA folder

## 2025-07-01 NOTE — TELEPHONE ENCOUNTER
07/01/25 at 1643: this NP attempted to contact insurance company #[phone], option #1 to arrange for P2P. Answered prompts but then heard a message stating \"we are currently experiencing difficulties, please call back at a later time. Good bye\" and call ended abruptly.     Will try again tomorrow.

## 2025-07-01 NOTE — TELEPHONE ENCOUNTER
Good Morning, Please be advised that the  Patient is scheduled on July 3, 2025 for a CT CHEST/ABDOMEN (CPT=71250/34221).  The CT ABDOMEN has been Denied by the Patient Health Plan. According to Chanel,  Dr. Cevallos can call and initiate a P2P to be done to see if the Denial can be overturned.  All clinicals has been submitted. Case# [id number] Tel#[phone] option1    Thanks,  Leigh    Denial Rationale:    Dear JOSE REDDY:  [facility] Health Plans, offered by [employer] and [facility],   looked at services requested for JOSE PIEDRA. The request received on   06/27/2025 for the coverage of the services listed below was Denied.  Procedure Description Units   Requested  Units   Denied  Modifier (if   applicable)  06838 Computed tomography (CT) (a   special kind of picture) of your   stomach area without contrast   (dye)  1 1  Your doctor told us that you have been treated for cancer in your kidney(s). An imaging   study was asked for. We cannot approve this request because:  We did not receive results of testing that confirms the stage of your disease.  We did not receive a detailed history that shows this study is needed.  This finding was based on [facility] Guidelines Section(s): Renal Cell   Cancer (RCC) - Surveillance (ONC 17.4) and 1.0 General Guidelines

## 2025-07-01 NOTE — PROCEDURES
Patient also describes medial left knee pain, at worst 4/10 in intensity without significant swelling.  X-ray has been ordered but the patient would like to obtain it in the future when he is free time.  I can message him with the results as well as possible next steps.    Risks and benefits of knee injection discussed with the patient, with risks including but not limited to pain and swelling at the injection site and/or within the knee joint, infection, elevation in blood pressure and/or glucose levels, facial flushing. After informed consent, the patient's right knee was marked, locally anesthetized with skin refrigerant, prepped with topical antiseptic, and injected with a mixture of 1mL 40mg/mL Kenalog, 2mL 1% lidocaine and 2mL 0.5% marcaine through the inferolateral portal.  A band-aid was applied.  The patient tolerated the procedure well.    Chuck Stevens PA-C  [facility]

## 2025-07-02 ENCOUNTER — PATIENT MESSAGE (OUTPATIENT)
Dept: SURGERY | Facility: CLINIC | Age: 60
End: 2025-07-02

## 2025-07-02 NOTE — TELEPHONE ENCOUNTER
Status Of Auth is Partial Approval.  Attempted to reach out to patient by phone and/or Mychart.  Insurance will not cover without approval.  PATIENT CANNOT PROCEED.  Please have patient reach out to provider for next steps.    CT CHEST/ABDOMEN (CPT=71250/94221)     Covering ERNST MUSE     Partial Approval   CPT CODE : 35136 > Approved   Authorization # [id number] / [id number] Date(s) of Service: 06/27/2025 - 08/11/2025      CT CHEST/ABDOMEN (CPT=71250/27257)   CPT CODE : 21678 > Denied   [employer]  case # [id number]  [id number] allowed through [employer] until 07/10/2025.  First Level Appeal allowed through [employer] until 9/3/2025.  Rational:       Notified clinical staff for followup , a copy of denial under Media tab    Notified pt of partial approval

## 2025-07-02 NOTE — TELEPHONE ENCOUNTER
CT CHEST/ABDOMEN (CPT=71250/95932)     Case overturned    82321 - APPROVED    Auth # / Ref #    [id number] / [id number]    Date(s) of Service : 06/27/2025 - 08/11/2025    Description    Your case has been approved by the benefit manager.    57376 - APPROVED WITH MODIFICATIONS    Auth # / Ref #    [id number] / [id number]    Date(s) of Service    07/02/2025 - 08/16/2025      Pt notified

## 2025-07-02 NOTE — TELEPHONE ENCOUNTER
07/02/25 at 1212:  This NP called [facility] again at [phone], spoke with nurse Gaby SINGH. Was informed that reason for denial was that they were missing results of testing that confirms stage of disease and a detailed history that shows that CT study is needed.    This NP provided the appropriate information based on historical records found in patient's chart and referred to NCCN guidelines for follow-up post-radical nephrectomy for Stage 1b RCC (imaging of chest and abdomen annually for 1st 5 yrs).    P2P no longer needed, Gaby RN able to overturn denial, now approved for CPT 54949 CT Chest/Abdomen between dates of 07/02/25 and 08/16/25 but stated that this is not guarantee of payment.  Authorization# [id number]    07/02/25 at 1225: This NP contacted patient, notified him of approval of CT scan, ok to proceed with scan tomorrow as scheduled.     This encounter is now closed

## 2025-07-03 ENCOUNTER — HOSPITAL ENCOUNTER (OUTPATIENT)
Dept: CT IMAGING | Age: 60
Discharge: HOME | End: 2025-07-03
Attending: UROLOGY
Payer: MEDICAID

## 2025-07-03 DIAGNOSIS — C64.9 RENAL CELL CARCINOMA, UNSPECIFIED LATERALITY (HCC): ICD-10-CM

## 2025-07-03 PROCEDURE — 71250 CT THORAX DX C-: CPT | Performed by: UROLOGY

## 2025-07-03 PROCEDURE — 74150 CT ABDOMEN W/O CONTRAST: CPT | Performed by: UROLOGY

## 2025-07-07 ENCOUNTER — TELEPHONE (OUTPATIENT)
Dept: FAMILY MEDICINE CLINIC | Facility: CLINIC | Age: 60
End: 2025-07-07

## 2025-07-07 NOTE — TELEPHONE ENCOUNTER
Received fax from [employer] that a Prior Authorization is needed for the Mounjaro 2.5mg/0.5ml injector. Please call plan at [phone]. Patient ID#[id number]. Placed in physician folder.

## 2025-07-09 ENCOUNTER — TELEPHONE (OUTPATIENT)
Dept: FAMILY MEDICINE CLINIC | Facility: CLINIC | Age: 60
End: 2025-07-09

## 2025-07-09 DIAGNOSIS — L98.9 SKIN LESION: Primary | ICD-10-CM

## 2025-07-09 NOTE — TELEPHONE ENCOUNTER
Patient is requesting a new referral to see Dr.Renuka TRIPP Melvin for Eval and follow up appointments.       is not accepting new patient's at this time.    Also calling to follow up on Prior Authorization for Mounjaro.

## 2025-07-14 ENCOUNTER — TELEPHONE (OUTPATIENT)
Dept: ORTHOPEDICS CLINIC | Facility: CLINIC | Age: 60
End: 2025-07-14

## 2025-07-14 DIAGNOSIS — M25.522 LEFT ELBOW PAIN: Primary | ICD-10-CM

## 2025-07-22 NOTE — TELEPHONE ENCOUNTER
Future Appointments   Date Time Provider Department Center   7/23/2025 11:30 AM Michael Crabtree MD [facility] [address] [facility]   7/28/2025 10:25 AM WDR XR RM1 WDR XRAY [facility]   7/28/2025 10:40 AM Hugo Palma MD [facility] ORTHO [facility] [id number]   10/7/2025  2:00 PM Aravind Apodaca MD [id number] [address]

## 2025-07-23 ENCOUNTER — OFFICE VISIT (OUTPATIENT)
Dept: FAMILY MEDICINE CLINIC | Facility: CLINIC | Age: 60
End: 2025-07-23
Payer: MEDICAID

## 2025-07-23 ENCOUNTER — TELEPHONE (OUTPATIENT)
Dept: FAMILY MEDICINE CLINIC | Facility: CLINIC | Age: 60
End: 2025-07-23

## 2025-07-23 VITALS
HEIGHT: 74 IN | SYSTOLIC BLOOD PRESSURE: 122 MMHG | TEMPERATURE: 98 F | OXYGEN SATURATION: 98 % | HEART RATE: 100 BPM | DIASTOLIC BLOOD PRESSURE: 76 MMHG | BODY MASS INDEX: 31.18 KG/M2 | RESPIRATION RATE: 16 BRPM | WEIGHT: 243 LBS

## 2025-07-23 DIAGNOSIS — E11.9 TYPE 2 DIABETES MELLITUS WITHOUT COMPLICATION, WITHOUT LONG-TERM CURRENT USE OF INSULIN (HCC): Primary | ICD-10-CM

## 2025-07-23 DIAGNOSIS — E66.9 OBESITY (BMI 30-39.9): ICD-10-CM

## 2025-07-23 PROCEDURE — 99214 OFFICE O/P EST MOD 30 MIN: CPT | Performed by: FAMILY MEDICINE

## 2025-07-23 RX ORDER — ROSUVASTATIN CALCIUM 10 MG/1
10 TABLET, COATED ORAL NIGHTLY
Qty: 90 TABLET | Refills: 1 | Status: CANCELLED | OUTPATIENT
Start: 2025-07-23

## 2025-07-23 RX ORDER — SEMAGLUTIDE 0.68 MG/ML
0.25 INJECTION, SOLUTION SUBCUTANEOUS WEEKLY
Qty: 1 EACH | Refills: 0 | Status: SHIPPED | OUTPATIENT
Start: 2025-07-23

## 2025-07-23 NOTE — TELEPHONE ENCOUNTER
Received incoming fax requesting a Prior Authorization for Ozempic.    Key:[id number]  Placed fax in MA folder.

## 2025-07-23 NOTE — PATIENT INSTRUCTIONS
Thank you for choosing Michael Crabtree MD at [facility]  To Do: Bill Raygoza  1. Please see below   Call [phone] to schedule the appointment.   Please signup for [employer], which is electronic access to your record if you have not done so.  All your results will post on there.  [email or web address]   You can NOW use [employer]Fire to book your appointments with us, or consider using open access scheduling which is through the [employer] website [email or web address] and type in Michael Crabtree MD and follow the links for \"Schedule Online Now\"    To schedule Imaging or tests at [place] call Central Scheduling [phone], Go to [facility] A ER Building (For example: CT scans, X rays, Ultrasound, MRI)  Cardiac Testing in ER building Building A second floor Cardiac Testing [phone] (For example: Holter Monitor, Cardiac Stress tests,Event Monitor, or 2D Echocardiograms)  [facility] call [phone] usually in [facility] A  Walk in Clinic in [place] at [address] Mon-Fri at 8am-7:30 p.m., and Sat/Sun 9:00a.m.-4:30 p.m.  Also at [address] [place] [address]  Call [phone] for info     Please call our office about any questions regarding your treatment/medicines/tests as a result of today's visit.  For your safety, read the entire package insert of all medicines prescribed to you and be aware of all of the risks of treatment even beyond those discussed today.  All therapies have potential risk of harm or side effects or medication interactions.  It is your duty and for your safety to discuss with the pharmacist and our office with questions, and to notify us and stop treatment if problems arise, but know that our intention is that the benefits outweigh those potential risks and we strive to make you healthier and to improve your quality of life.    Referrals, and Radiology Information:    If your insurance requires a referral to a specialist, please allow 5 business days to process your  referral request.    If Michael Crabtree MD orders a CT or MRI, it may take up to 10 business days to receive approval from your insurance company. Once our office has called informing you that the insurance company approved your testing, please call Central Scheduling at [phone]  Please allow our office 5 business days to contact you regarding any testing results.    Refill policies:   Allow 3 business days for refills; controlled substances may take longer and must be picked up from the office in person.  Narcotic medications can only be filled in 30 day increments and must be refilled at an office visit only.  If your prescription is due for a refill, you may be due for a follow-up appointment.  We cannot refill your maintenance medications at a preventative wellness visit.  To best provide you care, patients receiving maintenance medications need to be seen at least twice a year.

## 2025-07-23 NOTE — PROGRESS NOTES
Subjective:   Patient ID: Bill Raygoza is a 59 year old male.    HPI  Mr. Raygoza is a very pleasant 59-year-old gentleman with history of right nephrectomy secondary to renal cell carcinoma of the right kidney, hypertension which is diet-controlled, anxiety, arthritis here with his wife for his follow-up appointment.  He had labs done previously which showed A1c 6.6%.  Insurance denied Mounjaro and previous to that Zepbound.  He promised me today that he will work on improving his lifestyle by watching his dietary portions and exercising more.  He does not want to take a statin and blood pressure medications for now.  No fever no cough no chest pain or shortness of breath no nausea no vomiting no Abdo pain no numbness no visual disturbance.  However he would like to try a different medication to help manage his diabetes.    I had reviewed past medical and family histories together with allergy and medication lists documented.    History/Other:   Review of Systems   Constitutional:  Negative for fatigue and fever.   Respiratory:  Negative for cough and shortness of breath.    Cardiovascular:  Negative for chest pain.   Gastrointestinal:  Negative for abdominal pain, diarrhea, nausea and vomiting.     Current Medications[1]  Allergies:Allergies[2]    Objective:   Physical Exam  Vitals reviewed.   Constitutional:       General: He is not in acute distress.  HENT:      Mouth/Throat:      Mouth: Mucous membranes are moist.      Pharynx: Oropharynx is clear.   Eyes:      General: No scleral icterus.     Conjunctiva/sclera: Conjunctivae normal.   Cardiovascular:      Rate and Rhythm: Normal rate.      Heart sounds: Normal heart sounds. No murmur heard.  Pulmonary:      Effort: Pulmonary effort is normal. No respiratory distress.      Breath sounds: Normal breath sounds. No wheezing.   Abdominal:      General: Bowel sounds are normal.      Palpations: Abdomen is soft.   Musculoskeletal:      Cervical back: Neck supple.       Right lower leg: No edema.      Left lower leg: No edema.   Lymphadenopathy:      Cervical: No cervical adenopathy.   Neurological:      Mental Status: He is alert.         Assessment & Plan:   1. Type 2 diabetes mellitus without complication, without long-term current use of insulin ([facility])    2. Obesity (BMI 30-39.9)    -Will start Ozempic 0.25 mg every week  - Maintain daily calorie deficit and low-carb diet  - Agree with exercising  - Discussed possible side effects of aforementioned medication which include but not limited to allergic reaction, nausea, vomiting, diarrhea, constipation, abdominal pain, lightheadedness, dizziness  - We shall check routine labs after 3 months  Including A1c and urine microalbumin  - Follow-up after 3 months    This note was prepared using Dragon Medical voice recognition dictation software. As a result errors may occur. When identified these errors have been corrected. While every attempt is made to correct errors during dictation discrepancies may still exist          Orders Placed This Encounter   Procedures    Microalb/Creat Ratio, Random Urine [E]    Hemoglobin A1C [E]    Comp Metabolic Panel (14) [E]    Lipid Panel [E]       Meds This Visit:  Requested Prescriptions     Signed Prescriptions Disp Refills    semaglutide (OZEMPIC, 0.25 OR 0.5 MG/DOSE,) 2 MG/3ML Subcutaneous Solution Pen-injector 1 each 0     Sig: Inject 0.25 mg into the skin once a week.       Imaging & Referrals:  OPHTHALMOLOGY - INTERNAL         [1]   Current Outpatient Medications   Medication Sig Dispense Refill    semaglutide (OZEMPIC, 0.25 OR 0.5 MG/DOSE,) 2 MG/3ML Subcutaneous Solution Pen-injector Inject 0.25 mg into the skin once a week. 1 each 0    Tadalafil (CIALIS) 20 MG Oral Tab Take 1 tablet (20 mg total) by mouth daily as needed for Erectile Dysfunction. 30 tablet 5    HYDROCHLOROTHIAZIDE 25 MG Oral Tab TAKE 1 TABLET(25 MG) BY MOUTH DAILY 90 tablet 1    PAROXETINE 20 MG Oral Tab TAKE 1 TABLET(20 MG)  BY MOUTH EVERY MORNING 90 tablet 0    Tadalafil (CIALIS) 20 MG Oral Tab Take 1 tablet (20 mg total) by mouth daily as needed for Erectile Dysfunction. 30 tablet 1    clobetasol 0.05 % External Cream Apply 1 Application topically 2 (two) times daily. 60 g 1    aspirin 325 MG Oral Tab Take 1 tablet (325 mg total) by mouth daily.      Multiple Vitamin (ONE-DAILY MULTI VITAMINS) Oral Tab Take 1 tablet by mouth daily.     [2] No Known Allergies

## 2025-07-24 ENCOUNTER — TELEPHONE (OUTPATIENT)
Dept: FAMILY MEDICINE CLINIC | Facility: CLINIC | Age: 60
End: 2025-07-24

## 2025-07-24 RX ORDER — HYDROCORTISONE 25 MG/G
1 CREAM TOPICAL 2 TIMES DAILY
Qty: 28 G | Refills: 0 | Status: SHIPPED | OUTPATIENT
Start: 2025-07-24

## 2025-07-24 NOTE — TELEPHONE ENCOUNTER
Patient came into the office, was expecting a face cream RX to be sent.   Also, Ozempic is a Prior Authorization.

## 2025-07-25 ENCOUNTER — PATIENT MESSAGE (OUTPATIENT)
Dept: FAMILY MEDICINE CLINIC | Facility: CLINIC | Age: 60
End: 2025-07-25

## 2025-07-25 DIAGNOSIS — G25.81 RESTLESS LEG: Primary | ICD-10-CM

## 2025-07-25 NOTE — TELEPHONE ENCOUNTER
It might be due to restless legs.  May try magnesium over-the-counter at nighttime and see if this will help.  Kindly add iron and TIBC and ferritin with his next labs and magnesium

## 2025-07-28 ENCOUNTER — OFFICE VISIT (OUTPATIENT)
Dept: ORTHOPEDICS CLINIC | Facility: CLINIC | Age: 60
End: 2025-07-28
Payer: MEDICAID

## 2025-07-28 ENCOUNTER — HOSPITAL ENCOUNTER (OUTPATIENT)
Dept: GENERAL RADIOLOGY | Age: 60
Discharge: HOME | End: 2025-07-28
Attending: ORTHOPAEDIC SURGERY
Payer: MEDICAID

## 2025-07-28 VITALS — BODY MASS INDEX: 30.8 KG/M2 | HEIGHT: 74 IN | WEIGHT: 240 LBS

## 2025-07-28 DIAGNOSIS — M25.522 LEFT ELBOW PAIN: ICD-10-CM

## 2025-07-28 DIAGNOSIS — M77.12 LATERAL EPICONDYLITIS OF LEFT ELBOW: Primary | ICD-10-CM

## 2025-07-28 PROCEDURE — 73080 X-RAY EXAM OF ELBOW: CPT | Performed by: ORTHOPAEDIC SURGERY

## 2025-07-28 RX ORDER — TRIAMCINOLONE ACETONIDE 40 MG/ML
40 INJECTION, SUSPENSION INTRA-ARTICULAR; INTRAMUSCULAR ONCE
Status: COMPLETED | OUTPATIENT
Start: 2025-07-28 | End: 2025-07-28

## 2025-07-28 RX ORDER — KETOROLAC TROMETHAMINE 30 MG/ML
30 INJECTION, SOLUTION INTRAMUSCULAR; INTRAVENOUS ONCE
Status: COMPLETED | OUTPATIENT
Start: 2025-07-28 | End: 2025-07-28

## 2025-07-28 RX ADMIN — TRIAMCINOLONE ACETONIDE 40 MG: 40 INJECTION, SUSPENSION INTRA-ARTICULAR; INTRAMUSCULAR at 11:37:00

## 2025-07-28 RX ADMIN — KETOROLAC TROMETHAMINE 30 MG: 30 INJECTION, SOLUTION INTRAMUSCULAR; INTRAVENOUS at 11:37:00

## 2025-07-28 NOTE — PROCEDURES
Left Elbow Joint Injection    Name: Bill Raygoza   MRN: [id number]  Date: 7/28/2025     Clinical Indications:   Lateral Epicondylitis    After informed consent, the injection site was marked, sterilized with topical chlorhexidine antiseptic, and locally anesthetized with skin refrigerant.    The patient was placed in a comfortable position and the elbow was exposed. Using sterile technique: 1 mL of 30mg/mL of Ketorolac, 1 mL of 0.5% Bupivicaine, 1 mL of 1% Lidocaine, and 1 mL of 40mg/mL Triamcinolone was injected with direct approach utilizing a 25 gauge needle.  A band-aid was applied.  The patient tolerated the procedure well.    Disposition:   Continue with physical/occupational therapy and follow up in clinic if symptoms do not resolve in 8 weeks.         Hugo Palma MD  Knee, Shoulder, & Elbow Surgery / Sports Medicine Specialist  Orthopaedic Surgery  [address] [place] [id number]   [email or web address]  t: [phone]  o: [phone]  f: [phone]

## 2025-07-28 NOTE — H&P
[facility] - ORTHOPEDICS  [address] [place] [id number]  [phone]     NEW PATIENT VISIT - HISTORY AND PHYSICAL EXAMINATION     Name: Bill Raygoza   MRN: [id number]  Date: 7/28/2025     CC: Left lateral elbow pain    REFERRED BY: Michael Crabtree MD    HPI:   Bill Raygoza is a very pleasant 59 year old left-hand dominant male who presents today for evaluation of Left lateral elbow pain.  This has been ongoing for 4 WEEKS.  The pain is rated 7/10.  They deny any specific injury but feel that this is a overuse type of injury associated with repetitive maneuvers.  Pain worsens with lifting and resistive wrist extension.  This is leading to modification of activities and limitation in exercise.    The patient has not tried any previous conservative measures such as physical therapy or injection management.    PMH:   Past Medical History[1]    PAST SURGICAL HX:  Past Surgical History[2]    FAMILY HX:  Family History[3]    ALLERGIES:  Patient has no known allergies.    MEDICATIONS: Current Medications[4]    ROS: A complete 10 point review of systems performed and negative aside from the aforementioned per history of present illness.     SOCIAL HX:  Social History     Tobacco Use    Smoking status: Never    Smokeless tobacco: Never    Tobacco comments:     Updated 7/1/25   Substance Use Topics    Alcohol use: Never       PE:   Vitals:    07/28/25 1032   Weight: 240 lb (108.9 kg)   Height: 6' 2\" (1.88 m)     Estimated body mass index is 30.81 kg/m² as calculated from the following:    Height as of this encounter: 6' 2\" (1.88 m).    Weight as of this encounter: 240 lb (108.9 kg).    Physical Exam  Constitutional:       Appearance: Normal appearance.   HENT:      Head: Normocephalic and atraumatic.   Eyes:      Extraocular Movements: Extraocular movements intact.   Neck:      Musculoskeletal: Normal range of motion and neck supple.   Cardiovascular:      Pulses: Normal pulses.    Pulmonary:      Effort: Pulmonary effort is normal. No respiratory distress.   Abdominal:      General: There is no distension.   Skin:     General: Skin is warm.      Capillary Refill: Capillary refill takes less than 2 seconds.      Findings: No bruising.   Neurological:      General: No focal deficit present.      Mental Status: Alert.   Psychiatric:         Mood and Affect: Mood normal.     Examination of the left elbow  demonstrates:     Skin is intact, warm and dry.     Deformity:   none  Atrophy:   None    Palpation:     Medial Epicondyle Negative  Lateral Epicondyle Positive  Olecranon   Negative    ROM:   Flexion   full and symmetric  Extension  full and symmetric  Pronation  full and symmetric  Supination  full and symmetric    Strength:   Pain with resisted wrist extension and supination   No pain with resisted wrist flexion    Provocative Tests:   Moving Valgus Stress Test:  Negative  Biceps Hook Test:   Negative  Tinel's overlying Ulnar Nerve Negative    Neurovascular Upper Extremity (Bilateral)  Motor:    5/5 EPL, Finger Abduction, , Pinch, Deltoid  Sensation:   intact to light touch median, ulnar, radial and axillary nerve  Circulation:   Normal, 2+ radial pulse    The contralateral upper extremity is without limitation in range of motion or strength, no positive provocative maneuvers.     Radiographic Examination/Diagnostics:    XR of the elbow personally viewed, independently interpreted and radiology report was reviewed.    Left elbow radiographs are within normal limits without evidence of fracture or dislocation.  No significant evidence of degenerative or osteoarthritic changes.    IMPRESSION: Bill Raygoza is a 59 year old male with clinical history and physical examination consistent with Left lateral epicondylitis.    PLAN:   We had a detailed discussion outlining the etiology, anatomy, pathophysiology, and natural history of lateral epicondylitis.  This includes wearing tear of the  bone tendon junction of the common extensor musculature of the forearm.  Imaging was reviewed in detail, there is no evidence of bony abnormality.    I discussed the role of initiating conservative management which entails initial consideration of injection therapy which may include platelet rich plasma injection or corticosteroid/ketorolac injection coupled with initiating physical therapy.  Notably, I discussed the role of providing counterforce bracing as an additional modality to reduce the forces across the common extensor mass.    If symptoms do not resolve with 6 to 8 weeks of therapy after injection management, I discussed the role of possible MRI scan and consideration for Tenex ultrasound-guided tenotomy with our radiology colleagues.  All questions were answered to their satisfaction.  Referral for outpatient physical therapy was provided.    FOLLOW-UP:   Proceed with physical therapy.  Return to clinic for repeat evaluation in 6-8 weeks with Gracier PARVEZ Moreno PA-C. No imaging required at next visit.         Hugo Palma MD  Knee, Shoulder, & Elbow Surgery / Sports Medicine Specialist  [facility] Orthopaedic Surgery  [address] [place] [address].org  [email or web address]  t: [phone]  o: [phone]  f: [phone]    This note was dictated using Dragon software.  While it was briefly proofread prior to completion, some grammatical, spelling, and word choice errors due to dictation may still occur.         [1]   Past Medical History:   Back problem    Cancer (HCC)    CLEAR CELL RENAL CARCINOMA    Carcinoid tumor of kidney (HCC)    Essential hypertension    Hearing impaired person, bilateral    HEARING AIDS BILATERAL    Renal disorder    CKD. HX OF NEPHRECTOMY, FOLLOWS YEARLY WITH UROLOGY   [2]   Past Surgical History:  Procedure Laterality Date    Colonoscopy N/A 06/04/2024    Procedure: COLONOSCOPY with forcep polypectomy;  Surgeon: Windy Nguyen MD;  Location:   ENDOSCOPY    Nephrectomy Right 11/2020    Other surgical history  2107    Kidney cancer   [3]   Family History  Problem Relation Age of Onset    Cancer Father         prostate    Heart Disease Father     Diabetes Mother     Arthritis Mother    [4]   Current Outpatient Medications   Medication Sig Dispense Refill    hydrocortisone 2.5 % External Cream Apply 1 Application topically 2 (two) times daily. 28 g 0    semaglutide (OZEMPIC, 0.25 OR 0.5 MG/DOSE,) 2 MG/3ML Subcutaneous Solution Pen-injector Inject 0.25 mg into the skin once a week. 1 each 0    Tadalafil (CIALIS) 20 MG Oral Tab Take 1 tablet (20 mg total) by mouth daily as needed for Erectile Dysfunction. 30 tablet 5    HYDROCHLOROTHIAZIDE 25 MG Oral Tab TAKE 1 TABLET(25 MG) BY MOUTH DAILY 90 tablet 1    PAROXETINE 20 MG Oral Tab TAKE 1 TABLET(20 MG) BY MOUTH EVERY MORNING 90 tablet 0    Tadalafil (CIALIS) 20 MG Oral Tab Take 1 tablet (20 mg total) by mouth daily as needed for Erectile Dysfunction. 30 tablet 1    clobetasol 0.05 % External Cream Apply 1 Application topically 2 (two) times daily. 60 g 1    aspirin 325 MG Oral Tab Take 1 tablet (325 mg total) by mouth daily.      Multiple Vitamin (ONE-DAILY MULTI VITAMINS) Oral Tab Take 1 tablet by mouth daily.

## 2025-07-29 ENCOUNTER — TELEPHONE (OUTPATIENT)
Dept: FAMILY MEDICINE CLINIC | Facility: CLINIC | Age: 60
End: 2025-07-29

## 2025-07-29 DIAGNOSIS — E11.9 TYPE 2 DIABETES MELLITUS WITHOUT COMPLICATION, WITHOUT LONG-TERM CURRENT USE OF INSULIN (HCC): Primary | ICD-10-CM

## 2025-07-29 RX ORDER — LIRAGLUTIDE 6 MG/ML
0.6 INJECTION SUBCUTANEOUS WEEKLY
Qty: 3 ML | Refills: 0 | Status: SHIPPED | OUTPATIENT
Start: 2025-07-29

## 2025-08-14 ENCOUNTER — TELEPHONE (OUTPATIENT)
Dept: FAMILY MEDICINE CLINIC | Facility: CLINIC | Age: 60
End: 2025-08-14

## 2025-08-22 ENCOUNTER — PATIENT MESSAGE (OUTPATIENT)
Dept: SURGERY | Facility: CLINIC | Age: 60
End: 2025-08-22

## (undated) DEVICE — SUT MCRYL 4-0 18IN PS-2 ABSRB UD 19MM 3/8 CIR

## (undated) DEVICE — MINI LAP PACK-LF: Brand: MEDLINE INDUSTRIES, INC.

## (undated) DEVICE — REMOVER LOT 4OZ NONIRRITATING UNSCNT SFT

## (undated) DEVICE — GLOVE SUR 7 SENSICARE PIP WHT PWD F

## (undated) DEVICE — GIJAW SINGLE-USE BIOPSY FORCEPS WITH NEEDLE: Brand: GIJAW

## (undated) DEVICE — 3M™ RED DOT™ MONITORING ELECTRODE WITH FOAM TAPE AND STICKY GEL 2570-3, 3/BAG, 200/CASE, 54/PLT: Brand: RED DOT™

## (undated) DEVICE — 1200CC GUARDIAN II: Brand: GUARDIAN

## (undated) DEVICE — KIT CUSTOM ENDOPROCEDURE STERIS

## (undated) DEVICE — NEEDLE SPNL 22GA L5IN BLK HUB QNCKE BVL DISP

## (undated) DEVICE — KIT VLV 5 PC AIR H2O SUCT BX ENDOGATOR CONN

## (undated) DEVICE — GLOVE SUR 6.5 SENSICARE PI PIP CRM PWD F

## (undated) DEVICE — ADHESIVE SKIN TOP FOR WND CLSR DERMBND ADV

## (undated) DEVICE — GLV SURG SZ 7.5 THK10MIL WHT ISOLEX SYN

## (undated) DEVICE — SOLUTION PREP 26ML 0.7% POVACRYLEX 74% ISO

## (undated) DEVICE — GAUZE,SPONGE,4"X4",12PLY,STERILE,LF,2'S: Brand: MEDLINE

## (undated) DEVICE — PAIN TRAY: Brand: MEDLINE INDUSTRIES, INC.

## (undated) DEVICE — 10FT COMBINED O2 DELIVERY/CO2 MONITORING. FILTER WITH MICROSTREAM TYPE LUER: Brand: DUAL ADULT NASAL CANNULA

## (undated) DEVICE — SLEEVE COMPR MD KNEE LEN SGL USE KENDALL SCD

## (undated) DEVICE — BANDAGE ADH W1INXL3IN NAT FAB WVN N ADH PD

## (undated) DEVICE — SOLUTION IRRIG 1000ML 0.9% NACL USP BTL

## (undated) DEVICE — GLOVE SUR 6.5 SENSICARE PI PIP GRN PWD F

## (undated) NOTE — Clinical Note
Tammy Talley    Sending a patient your way - hx of Renal Ca (carcinoid) s/p L nephrectomy @ [facility] ([facility]) about year and a half ago. Stable otherwise.  Thanks    KP

## (undated) NOTE — LETTER
23    Patient: Juliet Luz  : 1965 Visit date: 2023    Dear  Munira Orosco MD    Thank you for referring Juliet Luz to my practice. Please find my assessment and plan below. Good morning. I saw Felicity Da Silva in my clinic today. Based on my assessment, he is of average risk for colon cancer but has never had one. He denies any symptoms currently. We are planning for colonoscopy in February of next year. All questions were answered. Thank you once again for the referral and please let me know if you need anything.     Sincerely,       Eliel Hunt MD